# Patient Record
Sex: FEMALE | Race: WHITE | Employment: OTHER | URBAN - METROPOLITAN AREA
[De-identification: names, ages, dates, MRNs, and addresses within clinical notes are randomized per-mention and may not be internally consistent; named-entity substitution may affect disease eponyms.]

---

## 2017-06-15 ENCOUNTER — HOSPITAL ENCOUNTER (OUTPATIENT)
Age: 67
Setting detail: SPECIMEN
Discharge: HOME | End: 2017-06-15
Payer: MEDICARE

## 2017-06-16 LAB
DIRECT EXAM: NEGATIVE
DIRECT EXAM: NORMAL
Lab: NORMAL
SPECIMEN DESCRIPTION: NORMAL
STATUS: NORMAL

## 2017-07-17 ENCOUNTER — HOSPITAL ENCOUNTER (OUTPATIENT)
Age: 67
Setting detail: SPECIMEN
Discharge: HOME | End: 2017-07-17
Payer: MEDICARE

## 2017-07-17 LAB
ALBUMIN SERPL-MCNC: 4.3 G/DL (ref 3.5–5.2)
ALBUMIN/GLOBULIN RATIO: 1.9 (ref 1–2.5)
ALP BLD-CCNC: 109 U/L (ref 35–104)
ALT SERPL-CCNC: 18 U/L (ref 5–33)
AMYLASE: 49 U/L (ref 28–100)
ANION GAP SERPL CALCULATED.3IONS-SCNC: 15 MMOL/L (ref 9–17)
AST SERPL-CCNC: 24 U/L
BILIRUB SERPL-MCNC: 0.29 MG/DL (ref 0.3–1.2)
BUN BLDV-MCNC: 22 MG/DL (ref 8–23)
BUN/CREAT BLD: ABNORMAL (ref 9–20)
CALCIUM SERPL-MCNC: 9.5 MG/DL (ref 8.6–10.4)
CHLORIDE BLD-SCNC: 103 MMOL/L (ref 98–107)
CHOLESTEROL, FASTING: 203 MG/DL
CHOLESTEROL/HDL RATIO: 5.3
CO2: 24 MMOL/L (ref 20–31)
CREAT SERPL-MCNC: 1.14 MG/DL (ref 0.5–0.9)
GFR AFRICAN AMERICAN: 58 ML/MIN
GFR NON-AFRICAN AMERICAN: 48 ML/MIN
GFR SERPL CREATININE-BSD FRML MDRD: ABNORMAL ML/MIN/{1.73_M2}
GFR SERPL CREATININE-BSD FRML MDRD: ABNORMAL ML/MIN/{1.73_M2}
GLUCOSE FASTING: 108 MG/DL (ref 70–99)
HDLC SERPL-MCNC: 38 MG/DL
LDL CHOLESTEROL: 106 MG/DL (ref 0–130)
LIPASE: 65 U/L (ref 13–60)
POTASSIUM SERPL-SCNC: 4 MMOL/L (ref 3.7–5.3)
SODIUM BLD-SCNC: 142 MMOL/L (ref 135–144)
TOTAL PROTEIN: 6.6 G/DL (ref 6.4–8.3)
TRIGLYCERIDE, FASTING: 294 MG/DL
VLDLC SERPL CALC-MCNC: ABNORMAL MG/DL (ref 1–30)

## 2017-09-26 ENCOUNTER — HOSPITAL ENCOUNTER (OUTPATIENT)
Dept: GENERAL RADIOLOGY | Age: 67
Discharge: HOME | End: 2017-09-26
Payer: MEDICARE

## 2017-09-26 ENCOUNTER — HOSPITAL ENCOUNTER (OUTPATIENT)
Age: 67
Discharge: HOME | End: 2017-09-26
Payer: MEDICARE

## 2017-09-26 DIAGNOSIS — R52 PAIN: ICD-10-CM

## 2017-09-26 PROCEDURE — 73562 X-RAY EXAM OF KNEE 3: CPT

## 2017-10-24 ENCOUNTER — HOSPITAL ENCOUNTER (OUTPATIENT)
Age: 67
Setting detail: SPECIMEN
Discharge: HOME | End: 2017-10-24
Payer: MEDICARE

## 2017-10-30 LAB — DERMATOLOGY PATHOLOGY REPORT: NORMAL

## 2017-11-20 ENCOUNTER — HOSPITAL ENCOUNTER (OUTPATIENT)
Dept: WOMENS IMAGING | Age: 67
Discharge: HOME | End: 2017-11-20
Payer: MEDICARE

## 2017-11-20 DIAGNOSIS — Z12.31 SCREENING MAMMOGRAM, ENCOUNTER FOR: ICD-10-CM

## 2017-11-20 PROCEDURE — 77063 BREAST TOMOSYNTHESIS BI: CPT

## 2018-10-23 ENCOUNTER — HOSPITAL ENCOUNTER (OUTPATIENT)
Dept: GENERAL RADIOLOGY | Age: 68
Discharge: HOME | End: 2018-10-25
Payer: MEDICARE

## 2018-10-23 ENCOUNTER — HOSPITAL ENCOUNTER (OUTPATIENT)
Age: 68
Discharge: HOME | End: 2018-10-25
Payer: MEDICARE

## 2018-10-23 DIAGNOSIS — R52 PAIN: ICD-10-CM

## 2018-10-23 PROCEDURE — 73502 X-RAY EXAM HIP UNI 2-3 VIEWS: CPT

## 2022-04-22 NOTE — PROGRESS NOTES
[facility]: IVIS BUTLER W  Acute Inpatient Rehab Preadmission Assessment    Patient Name: Ruthie Churchill        MRN: [id number]    : 1950  (66 y.o.)  Gender: female     Admitted from:   [facility]  [][facility]   [][facility]   [][facility]   [x]Outside Admission - Location: [facility]                                 [x]Initial         []Updated    Date of Onset / Admission to the acute hospital:  22    Inpatient Rehabilitation Admitting Diagnosis:  Debility    Did patient have surgery/procedures? []No  [x]Yes:      22  L2-5 laminectomy with L4-5 fusion     Physicians: Deloris (NS), Jase (PMR)    Risk for clinical complications: Moderate    Co-morbidities:      1. HTN  2. HLD  3. DDD  4. Chronic reactive otitis externa  5. Leg length discrepancy  6. Hx polio with foot drop    Financial Information  Primary insurance:  [x]Medicare [] Medicare HMO  []Commercial insurance    []Medicaid   [] Medicaid HMO []Workers Compensation   []Personal Pay    Secondary Insurance:  []Medicare [] Medicare HMO  [x]Commercial insurance    []Medicaid  []Medicaid HMO  []Workers Compensation []None    Precautions:   []Cardiac Precautions:    []Total hip precautions:    []Weight Bearing status:  [x]Safety Precautions/Concerns:  Fall Risk, General Precautions, Spinal Precautions, Lumbar brace   [x]Visually impaired:  Blind L eye   []Hard of Hearing:     Isolation Precautions:         []Yes  [x]No  If Yes:  [] Droplet  []Contact []Airborne     []VRE     []MRSA     []C-diff         [] TB       [] ESBL [] MDRO          [] Other:        Physiatrist:  []   Dr. Angelito Villarreal     [x]   Dr. Narcisa Livingston  []   Dr. Wiliam Phillip  []   Dr. Baldemar Gunn    Patients Occupation: Retired    Reviewed Lab and Diagnostic reports from Current Admission: Yes    Patients Prior Functional  Level: Independent with ADLs, functional transfers, and gait    History of current illness, per PM&R Chart Review:  RHD Patient with history spinal stenosis at L4-5.  Dr. Yoshi Harding performed L2-5 laminectomy with L4-5 fusion on 4/18/22. She had some hypoxemia post operatively and was managed in step down. Treated for post op acute respiratory failure secondary to poor inspiratory effort, atelectasis, and hypoventilation.      Prognosis: Fair    Current functional status for upper extremity ADLs:  Min A       Current functional status for lower extremity ADLs:  Dependent       Current functional status for bed, chair, wheelchair transfers:  Min A        Current functional status for toilet transfers:  Min A       Current functional status for locomotion:  15' RW Min A        Current functional status for comprehension: TBD    Current functional status for expression: TBD    Current functional status for social interaction: TBD    Current functional status for problem solving: TBD    Current functional status for memory: TBD    Current Deficits R/T Impairment: Impaired Functional Mobility and Decreased ADLs    Required Therapy:   [x] Physical Therapy  [x] Occupational Therapy   [x] Speech Therapy, as appropriate    Additional Services:    [x]     [] Recreational Therapy, as appropriate    [x] Nutrition    [] Dialysis  [] Cultural Needs Identified  [] Special Equipment Needs  [x] Other:  O2 as ordered    Rehab Justification:  Needs 3 hrs therapy per day or 15 hours per week:  Yes  Identified Rehab Nursing needs: Yes  Intense Interdisciplinary need:  Yes  Need for 24 hr physician supervision:  Yes  Measurable improved quality of life:  Yes  Willingness to participate:  Yes  Medical Necessity:  Yes  Patient able to tolerate care proposed:  Yes    Expected Discharge Destination/Functional Level:  Home with assist  Expected length of time to achieve that level of improvement: 1-2 weeks  Expected Post Discharge Treatments: Home with possible Home Care    Other information relevant to patient's care needs:  N/A    Acute Inpatient Rehabilitation Disclosure Statement will be provided to patient upon admission to ARU with patient's verbalization of understanding. I have reviewed and concur with the findings and results of the pre-admission screening assessment completed by the Inpatient Rehabilitation Admissions Coordinator.

## 2022-04-22 NOTE — PROGRESS NOTES
Spoke with Kunal Saavedra, [facility], who states pt will be medically ready on Saturday. Writer requested admitting orders with [facility] and continuation of DVT prophylaxis be faxed and report be called to [facility] station. Admission Assessment completed and Dr Melly Gamez notified.

## 2022-04-23 ENCOUNTER — HOSPITAL ENCOUNTER (INPATIENT)
Age: 72
LOS: 11 days | Discharge: HOME HEALTH | DRG: 947 | End: 2022-05-04
Attending: PHYSICAL MEDICINE & REHABILITATION | Admitting: PHYSICAL MEDICINE & REHABILITATION
Payer: MEDICARE

## 2022-04-23 DIAGNOSIS — R53.81 DEBILITY: ICD-10-CM

## 2022-04-23 DIAGNOSIS — Z98.890 STATUS POST LUMBAR SPINE SURGERY FOR DECOMPRESSION OF SPINAL CORD: Primary | ICD-10-CM

## 2022-04-23 PROCEDURE — 6370000000 HC RX 637 (ALT 250 FOR IP): Performed by: PHYSICAL MEDICINE & REHABILITATION

## 2022-04-23 PROCEDURE — 94640 AIRWAY INHALATION TREATMENT: CPT

## 2022-04-23 PROCEDURE — 6360000002 HC RX W HCPCS: Performed by: PHYSICAL MEDICINE & REHABILITATION

## 2022-04-23 PROCEDURE — 1180000000 HC REHAB R&B

## 2022-04-23 RX ORDER — AMLODIPINE BESYLATE 10 MG/1
10 TABLET ORAL DAILY
Status: DISCONTINUED | OUTPATIENT
Start: 2022-04-24 | End: 2022-05-04 | Stop reason: HOSPADM

## 2022-04-23 RX ORDER — DORZOLAMIDE HYDROCHLORIDE AND TIMOLOL MALEATE 20; 5 MG/ML; MG/ML
1 SOLUTION/ DROPS OPHTHALMIC 2 TIMES DAILY
Status: DISCONTINUED | OUTPATIENT
Start: 2022-04-24 | End: 2022-05-04 | Stop reason: HOSPADM

## 2022-04-23 RX ORDER — LIDOCAINE 4 G/G
2 PATCH TOPICAL DAILY
Status: DISCONTINUED | OUTPATIENT
Start: 2022-04-24 | End: 2022-04-28

## 2022-04-23 RX ORDER — MONTELUKAST SODIUM 10 MG/1
10 TABLET ORAL DAILY
Status: DISCONTINUED | OUTPATIENT
Start: 2022-04-24 | End: 2022-05-04 | Stop reason: HOSPADM

## 2022-04-23 RX ORDER — BISACODYL 10 MG
10 SUPPOSITORY, RECTAL RECTAL DAILY PRN
Status: DISCONTINUED | OUTPATIENT
Start: 2022-04-23 | End: 2022-05-04 | Stop reason: HOSPADM

## 2022-04-23 RX ORDER — PANTOPRAZOLE SODIUM 40 MG/1
40 TABLET, DELAYED RELEASE ORAL
Status: DISCONTINUED | OUTPATIENT
Start: 2022-04-24 | End: 2022-05-04 | Stop reason: HOSPADM

## 2022-04-23 RX ORDER — HEPARIN SODIUM 5000 [USP'U]/ML
5000 INJECTION, SOLUTION INTRAVENOUS; SUBCUTANEOUS EVERY 8 HOURS SCHEDULED
Status: DISCONTINUED | OUTPATIENT
Start: 2022-04-23 | End: 2022-05-04 | Stop reason: HOSPADM

## 2022-04-23 RX ORDER — ACETAMINOPHEN 325 MG/1
650 TABLET ORAL EVERY 4 HOURS PRN
Status: DISCONTINUED | OUTPATIENT
Start: 2022-04-23 | End: 2022-05-04 | Stop reason: HOSPADM

## 2022-04-23 RX ORDER — TIZANIDINE 4 MG/1
4 TABLET ORAL EVERY 6 HOURS PRN
Status: DISCONTINUED | OUTPATIENT
Start: 2022-04-23 | End: 2022-05-04 | Stop reason: HOSPADM

## 2022-04-23 RX ORDER — ALBUTEROL SULFATE 90 UG/1
2 AEROSOL, METERED RESPIRATORY (INHALATION) 4 TIMES DAILY
Status: DISCONTINUED | OUTPATIENT
Start: 2022-04-23 | End: 2022-04-25

## 2022-04-23 RX ORDER — TRAMADOL HYDROCHLORIDE 50 MG/1
50 TABLET ORAL EVERY 6 HOURS PRN
Status: DISCONTINUED | OUTPATIENT
Start: 2022-04-23 | End: 2022-05-04 | Stop reason: HOSPADM

## 2022-04-23 RX ORDER — POLYETHYLENE GLYCOL 3350 17 G/17G
17 POWDER, FOR SOLUTION ORAL DAILY
Status: DISCONTINUED | OUTPATIENT
Start: 2022-04-23 | End: 2022-05-04 | Stop reason: HOSPADM

## 2022-04-23 RX ORDER — ROSUVASTATIN CALCIUM 10 MG/1
5 TABLET, COATED ORAL NIGHTLY
Status: DISCONTINUED | OUTPATIENT
Start: 2022-04-23 | End: 2022-05-04 | Stop reason: HOSPADM

## 2022-04-23 RX ORDER — SENNA PLUS 8.6 MG/1
2 TABLET ORAL DAILY PRN
Status: DISCONTINUED | OUTPATIENT
Start: 2022-04-23 | End: 2022-05-04 | Stop reason: HOSPADM

## 2022-04-23 RX ORDER — ERGOCALCIFEROL 1.25 MG/1
50000 CAPSULE ORAL WEEKLY
Status: DISCONTINUED | OUTPATIENT
Start: 2022-04-24 | End: 2022-05-04 | Stop reason: HOSPADM

## 2022-04-23 RX ADMIN — ACETAMINOPHEN 650 MG: 325 TABLET ORAL at 21:48

## 2022-04-23 RX ADMIN — HEPARIN SODIUM 5000 UNITS: 5000 INJECTION INTRAVENOUS; SUBCUTANEOUS at 16:46

## 2022-04-23 RX ADMIN — HEPARIN SODIUM 5000 UNITS: 5000 INJECTION INTRAVENOUS; SUBCUTANEOUS at 21:48

## 2022-04-23 RX ADMIN — ROSUVASTATIN CALCIUM 5 MG: 10 TABLET, FILM COATED ORAL at 21:49

## 2022-04-23 RX ADMIN — TIZANIDINE 4 MG: 4 TABLET ORAL at 18:15

## 2022-04-23 RX ADMIN — TRAMADOL HYDROCHLORIDE 50 MG: 50 TABLET, COATED ORAL at 18:15

## 2022-04-23 RX ADMIN — ALBUTEROL SULFATE 2 PUFF: 90 AEROSOL, METERED RESPIRATORY (INHALATION) at 20:17

## 2022-04-23 ASSESSMENT — PAIN SCALES - GENERAL
PAINLEVEL_OUTOF10: 4
PAINLEVEL_OUTOF10: 3
PAINLEVEL_OUTOF10: 7

## 2022-04-23 ASSESSMENT — LIFESTYLE VARIABLES
HOW MANY STANDARD DRINKS CONTAINING ALCOHOL DO YOU HAVE ON A TYPICAL DAY: 1 OR 2
HOW OFTEN DO YOU HAVE A DRINK CONTAINING ALCOHOL: NEVER

## 2022-04-23 ASSESSMENT — PAIN DESCRIPTION - ORIENTATION
ORIENTATION: LOWER
ORIENTATION: LOWER
ORIENTATION: MID

## 2022-04-23 ASSESSMENT — PAIN DESCRIPTION - DESCRIPTORS
DESCRIPTORS: TIGHTNESS;ACHING;DISCOMFORT
DESCRIPTORS: ACHING;DULL

## 2022-04-23 ASSESSMENT — PAIN DESCRIPTION - PAIN TYPE: TYPE: ACUTE PAIN

## 2022-04-23 ASSESSMENT — PAIN DESCRIPTION - LOCATION
LOCATION: BACK

## 2022-04-23 ASSESSMENT — PAIN DESCRIPTION - ONSET: ONSET: GRADUAL

## 2022-04-23 ASSESSMENT — PAIN - FUNCTIONAL ASSESSMENT: PAIN_FUNCTIONAL_ASSESSMENT: ACTIVITIES ARE NOT PREVENTED

## 2022-04-23 ASSESSMENT — PAIN DESCRIPTION - FREQUENCY: FREQUENCY: INTERMITTENT

## 2022-04-23 NOTE — PROGRESS NOTES
ACUTE REHABILITATION ADMISSION    Patient admitted to the Inpatient Rehabilitation Unit via Wheelchair at 1150. Patient oriented to room, unit and fall prevention safety measures. Education provided on the rehabilitation routine: three hours of therapy five days per week. Admitting medication orders compared with acute stay medications; home medication list reviewed with patient/family. Medication issues identified No      Admitting medication orders reviewed with Acute Rehab PM&R Physician: Luisa Ganser. Deep Dc MD    Skin assessment performed by Karyn Garcia RN, all active alterations in skin integrity, wounds, lines, drains and airways assessed, measured, recorded and reconciled. Refer to [address] [place] [address] avatar and LDA flowsheet for additional information. Wound care consult order needed for complex wounds or pressure injuries? No If yes, contact physician for order. Bladder and Bowel Function Assessment:  1. Prior history of bladder problems: no problems with bladder  2. Number of pads used per day: 0  3. Frequency of night time voiding: once  4. Fluid intake volume and pattern: Normal  5. Last BM: 4/21/2022  6. Prior history of bowel problems: Yes     Constipation. Care plan was created with patient's input and goals were agreed upon. Admission folder with the following documents provided:  1. \"[facility] Individualized Disclosure Statement\"  2. \"Data Collection Information Summary for Patients in Inpatient Rehabilitation Facilities\"  3. \"Privacy Act Statement - Health Care Records\"    Please refer to the admission navigator for further information.

## 2022-04-24 PROBLEM — Z98.890 STATUS POST LUMBAR SPINE SURGERY FOR DECOMPRESSION OF SPINAL CORD: Status: ACTIVE | Noted: 2022-04-24

## 2022-04-24 PROBLEM — R11.2 NON-INTRACTABLE VOMITING WITH NAUSEA: Status: ACTIVE | Noted: 2022-04-24

## 2022-04-24 LAB
ABSOLUTE EOS #: 0.2 K/UL (ref 0–0.4)
ABSOLUTE LYMPH #: 3.4 K/UL (ref 1–4.8)
ABSOLUTE MONO #: 0.9 K/UL (ref 0.1–1.3)
ANION GAP SERPL CALCULATED.3IONS-SCNC: 11 MMOL/L (ref 9–17)
BASOPHILS # BLD: 1 % (ref 0–2)
BASOPHILS ABSOLUTE: 0.1 K/UL (ref 0–0.2)
BUN BLDV-MCNC: 25 MG/DL (ref 8–23)
CALCIUM SERPL-MCNC: 9.7 MG/DL (ref 8.6–10.4)
CHLORIDE BLD-SCNC: 101 MMOL/L (ref 98–107)
CO2: 26 MMOL/L (ref 20–31)
CREAT SERPL-MCNC: 1.17 MG/DL (ref 0.5–0.9)
EOSINOPHILS RELATIVE PERCENT: 2 % (ref 0–4)
GFR AFRICAN AMERICAN: 55 ML/MIN
GFR NON-AFRICAN AMERICAN: 45 ML/MIN
GFR SERPL CREATININE-BSD FRML MDRD: ABNORMAL ML/MIN/{1.73_M2}
GLUCOSE BLD-MCNC: 109 MG/DL (ref 70–99)
HCT VFR BLD CALC: 34.5 % (ref 36–46)
HEMOGLOBIN: 11.6 G/DL (ref 12–16)
LYMPHOCYTES # BLD: 32 % (ref 24–44)
MCH RBC QN AUTO: 30.3 PG (ref 26–34)
MCHC RBC AUTO-ENTMCNC: 33.6 G/DL (ref 31–37)
MCV RBC AUTO: 90.2 FL (ref 80–100)
MONOCYTES # BLD: 8 % (ref 1–7)
PDW BLD-RTO: 14.2 % (ref 11.5–14.9)
PLATELET # BLD: 436 K/UL (ref 150–450)
PMV BLD AUTO: 7.6 FL (ref 6–12)
POTASSIUM SERPL-SCNC: 4.2 MMOL/L (ref 3.7–5.3)
RBC # BLD: 3.82 M/UL (ref 4–5.2)
SEG NEUTROPHILS: 57 % (ref 36–66)
SEGMENTED NEUTROPHILS ABSOLUTE COUNT: 6.1 K/UL (ref 1.3–9.1)
SODIUM BLD-SCNC: 138 MMOL/L (ref 135–144)
WBC # BLD: 10.6 K/UL (ref 3.5–11)

## 2022-04-24 PROCEDURE — 97535 SELF CARE MNGMENT TRAINING: CPT

## 2022-04-24 PROCEDURE — 99223 1ST HOSP IP/OBS HIGH 75: CPT | Performed by: PHYSICAL MEDICINE & REHABILITATION

## 2022-04-24 PROCEDURE — 97110 THERAPEUTIC EXERCISES: CPT

## 2022-04-24 PROCEDURE — 6370000000 HC RX 637 (ALT 250 FOR IP): Performed by: PHYSICAL MEDICINE & REHABILITATION

## 2022-04-24 PROCEDURE — 1180000000 HC REHAB R&B

## 2022-04-24 PROCEDURE — 97116 GAIT TRAINING THERAPY: CPT

## 2022-04-24 PROCEDURE — 6360000002 HC RX W HCPCS: Performed by: PHYSICAL MEDICINE & REHABILITATION

## 2022-04-24 PROCEDURE — 85025 COMPLETE CBC W/AUTO DIFF WBC: CPT

## 2022-04-24 PROCEDURE — 97166 OT EVAL MOD COMPLEX 45 MIN: CPT

## 2022-04-24 PROCEDURE — 97162 PT EVAL MOD COMPLEX 30 MIN: CPT

## 2022-04-24 PROCEDURE — 80048 BASIC METABOLIC PNL TOTAL CA: CPT

## 2022-04-24 PROCEDURE — 99222 1ST HOSP IP/OBS MODERATE 55: CPT | Performed by: INTERNAL MEDICINE

## 2022-04-24 PROCEDURE — 36415 COLL VENOUS BLD VENIPUNCTURE: CPT

## 2022-04-24 PROCEDURE — 97530 THERAPEUTIC ACTIVITIES: CPT

## 2022-04-24 RX ORDER — ONDANSETRON 4 MG/1
4 TABLET, FILM COATED ORAL EVERY 8 HOURS PRN
Status: DISCONTINUED | OUTPATIENT
Start: 2022-04-24 | End: 2022-05-04 | Stop reason: HOSPADM

## 2022-04-24 RX ADMIN — PANTOPRAZOLE SODIUM 40 MG: 40 TABLET, DELAYED RELEASE ORAL at 05:50

## 2022-04-24 RX ADMIN — HEPARIN SODIUM 5000 UNITS: 5000 INJECTION INTRAVENOUS; SUBCUTANEOUS at 15:10

## 2022-04-24 RX ADMIN — DORZOLAMIDE HYDROCHLORIDE AND TIMOLOL MALEATE 1 DROP: 20; 5 SOLUTION/ DROPS OPHTHALMIC at 21:28

## 2022-04-24 RX ADMIN — TRAMADOL HYDROCHLORIDE 50 MG: 50 TABLET, COATED ORAL at 00:19

## 2022-04-24 RX ADMIN — AMLODIPINE BESYLATE 10 MG: 10 TABLET ORAL at 08:23

## 2022-04-24 RX ADMIN — TRAMADOL HYDROCHLORIDE 50 MG: 50 TABLET, COATED ORAL at 15:20

## 2022-04-24 RX ADMIN — TIZANIDINE 4 MG: 4 TABLET ORAL at 15:20

## 2022-04-24 RX ADMIN — TIZANIDINE 4 MG: 4 TABLET ORAL at 06:21

## 2022-04-24 RX ADMIN — TRAMADOL HYDROCHLORIDE 50 MG: 50 TABLET, COATED ORAL at 06:21

## 2022-04-24 RX ADMIN — MONTELUKAST SODIUM 10 MG: 10 TABLET, FILM COATED ORAL at 08:24

## 2022-04-24 RX ADMIN — TIZANIDINE 4 MG: 4 TABLET ORAL at 21:28

## 2022-04-24 RX ADMIN — ROSUVASTATIN CALCIUM 5 MG: 10 TABLET, FILM COATED ORAL at 21:27

## 2022-04-24 RX ADMIN — TIZANIDINE 4 MG: 4 TABLET ORAL at 00:19

## 2022-04-24 RX ADMIN — POLYETHYLENE GLYCOL 3350 17 G: 17 POWDER, FOR SOLUTION ORAL at 08:24

## 2022-04-24 RX ADMIN — TRAMADOL HYDROCHLORIDE 50 MG: 50 TABLET, COATED ORAL at 21:28

## 2022-04-24 RX ADMIN — HEPARIN SODIUM 5000 UNITS: 5000 INJECTION INTRAVENOUS; SUBCUTANEOUS at 05:50

## 2022-04-24 RX ADMIN — HEPARIN SODIUM 5000 UNITS: 5000 INJECTION INTRAVENOUS; SUBCUTANEOUS at 21:40

## 2022-04-24 RX ADMIN — ERGOCALCIFEROL 50000 UNITS: 1.25 CAPSULE ORAL at 08:24

## 2022-04-24 ASSESSMENT — PAIN DESCRIPTION - LOCATION
LOCATION: BACK

## 2022-04-24 ASSESSMENT — PAIN DESCRIPTION - DESCRIPTORS
DESCRIPTORS: ACHING

## 2022-04-24 ASSESSMENT — PAIN DESCRIPTION - ORIENTATION
ORIENTATION: LOWER
ORIENTATION: LOWER

## 2022-04-24 ASSESSMENT — PAIN SCALES - GENERAL
PAINLEVEL_OUTOF10: 6
PAINLEVEL_OUTOF10: 3
PAINLEVEL_OUTOF10: 2
PAINLEVEL_OUTOF10: 1

## 2022-04-24 ASSESSMENT — 9 HOLE PEG TEST
TEST_RESULT: FUNCTIONAL
TESTTIME_SECONDS: 29.4
TESTTIME_SECONDS: 27.3
TEST_RESULT: FUNCTIONAL

## 2022-04-24 NOTE — FLOWSHEET NOTE
04/24/22 1459   Encounter Summary   Encounter Overview/Reason  Volunteer Encounter   Service Provided For: Patient   Referral/Consult From: Gene   Last Encounter  04/24/22   Complexity of Encounter Low   Spiritual/Emotional needs   Type Spiritual Support   Rituals, Rites and Sacraments   Type Advent Communion   Assessment/Intervention/Outcome   Intervention Prayer (assurance of)/[place]

## 2022-04-24 NOTE — PLAN OF CARE
Problem: Safety - Adult  Goal: Free from fall injury  4/24/2022 0258 by Epifanio Knowles RN  Outcome: Progressing  Note: No falls or injuries sustained at this time. No attempts to get out of bed without nursing assistance. Call light within reach and pt. uses appropriately for assistance. Siderails up x 2. Nonskid footwear remains on. Bed in low and locked position. Hourly nursing rounds made. Pt. Alert and oriented, aware of limitations, and exhibits good safety judgement. Pt. uses assistive devices appropriately. Pt. understands individual fall risk factors. Problem: ABCDS Injury Assessment  Goal: Absence of physical injury  4/24/2022 0258 by Epifanio Knowles RN  Outcome: Progressing  Note: No falls or injuries sustained at this time. No attempts to get out of bed without nursing assistance. Call light within reach and pt. uses appropriately for assistance. Problem: Pain  Goal: Verbalizes/displays adequate comfort level or baseline comfort level  Outcome: Progressing  Note: Pain assessment completed. Pt. able to rest.Patient medicated with PRN tramadol for pain this shift as ordered. Patient relates a tolerable level of discomfort with the current medication. Nonverbal cues indicate pain relief. Pt. Rests quietly with eyes closed after pain medication administration. Respirations easy and unlabored. Appears free from distress.

## 2022-04-24 NOTE — PROGRESS NOTES
Physical Therapy  Facility/Department: [facility] ACUTE REHAB  Rehabilitation Physical Therapy Initial Assessment    NAME: Katherine Calvo  : 1950 (66 y.o.)  MRN: [id number]  CODE STATUS: Full Code    Date of Service: 22      Past Medical History:   Diagnosis Date    Arthritis     Chronic back pain     ddd    GERD (gastroesophageal reflux disease)     Hyperlipidemia     Hypertension     MVP (mitral valve prolapse)     Polio     right leg at age 2     Past Surgical History:   Procedure Laterality Date    BACK SURGERY      BREAST SURGERY      COLONOSCOPY      HEMORRHOID SURGERY      HYSTERECTOMY      LEG SURGERY      fused right heel and right leg lengthened-due to polio    TONSILLECTOMY         Chart Reviewed: Yes  Patient assessed for rehabilitation services?: Yes  Referring Practitioner: Dr Nikolai Melo  Referral Date : 22    Restrictions:  Restrictions/Precautions: Surgical Protocols; Fall Risk;General Precautions  Required Braces or Orthoses  Spinal: Lumbar Corset  Right Lower Extremity Brace: Ankle Foot Orthotics (Does not used, asked if family can bring here to assess.)  Position Activity Restriction  Spinal Precautions: No Bending; No Lifting; No Twisting (for 2 weeks after surgery, avoid strenous activity)  Other position/activity restrictions: No lifting greater than 1-2 lbs for 14 days     SUBJECTIVE  Pain: Reports pain 2/10 at back at rest, increased to 3/10 with mobility       Post Treatment Pain Screening Tolerable, pt get pain patches after shower.        Prior Level of Function:  Social/Functional History  Lives With: Spouse  Type of Home: House  Home Layout: One level  Home Access: Stairs to enter with rails (Has a brick  ledge /end to support on one side)  Entrance Stairs - Number of Steps: 1+1+ledge at [place] Stairs - Rails:  (Has  a post to hold on right side)  Bathroom Shower/Tub: Walk-in shower,Doors (Also has a Tub shower)  Bathroom Toilet: Handicap height  Bathroom Equipment: 3-in-1 commode (Has one suction bar which she can use if needed)  Bathroom Accessibility: Walker accessible  Home Equipment: Walker, rolling,Wheelchair-manual (RW and W/C borrowed)  Has the patient had two or more falls in the past year or any fall with injury in the past year?: Yes  Receives Help From: Family  ADL Assistance: Independent  Ambulation Assistance: Independent (Ind without device usually, lately used rolling walker )  Transfer Assistance: Independent  Active : Yes  Mode of Transportation: SUV  Occupation: Retired  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 Sleeps in a regular bed at home. 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 Pt reports that her 2 sons and wives are able to assist as needed. OBJECTIVE  Vision  Vision: Impaired  Vision Exceptions:  (Blind left eye, centeral vison in R eye only; ramón vision B )    Hearing  Hearing: Exceptions to [facility]  Hearing Exceptions: Left hearing aid;Bilateral hearing aid (Deaf in R ear, Always uses L aid, occasionally R aid)    Cognition  Overall Cognitive Status: WFL    ROM  AROM RLE (degrees)  RLE General AROM: Hip/Knee WFL, decreased R ankle DF from history of polio and previous foot/ankle surgery. R LE slight shorter than L LE. PROM LLE (degrees)  LLE PROM: Exceptions  AROM LLE (degrees)  LLE AROM : WFL  AROM RUE (degrees)  RUE General AROM: Reports  R shoulder is sore from fall last year.     Strength  Strength RLE  Comment: Hip 3/5, knee 3+/5, trace DF, PF 2/4  Strength LLE  Comment: 4/5    Quality of Movement       Sensation  Overall Sensation Status: Impaired (Pt reports new feeling of tape over the middle of left foot)    Functional Mobility  Bed mobility  Rolling to Left: Minimal assistance  Rolling to Right: Minimal assistance  Supine to Sit: Minimal assistance  Sit to Supine: Moderate assistance  Scooting: Minimal assistance  Comment: Pt very sleepy today, but easily arousable  pt reports nausea and did have vomit after sat at EOB, RN notified. Transfers  Sit to Stand: Minimal Assistance  Stand to sit: Minimal Assistance  Bed to Chair: Minimal assistance  Comment: Transfers with rolling walker, needs cues for hand placement  Balance  Posture: Good  Sitting - Static: Good  Sitting - Dynamic: Fair;+  Standing - Static: Fair;+  Standing - Dynamic: Fair  Comments: Standing with rolling walker    Environmental Mobility  Ambulation  Surface: level tile  Device: Rolling Walker  Assistance: Contact guard assistance;Minimal assistance  Quality of Gait: WBOS, R Decreased foot clearance, L Decreased foot clearance, Decreased claire, Forward trunk, R Foot flat, L Foot flat  Gait Deviations: Increased YASMANI; Decreased step length;Decreased step height;Slow Claire  Distance: 45 ft x2 (2MWT), pt had to stop after walking 45 ft (1 minute) and sit down due to vomiting. Pt gets up again and ambulates another 45 ft (1minute)  Comments: Fatiques easily, nausea and vomiting limiting activity. Ambulation 2  Surface - 2: level tile  Device 2: Rolling Walker  Assistance 2: Minimal assistance  Quality of Gait 2: Improved claire compared to a:m session, pt still sleepy, fallin g asleep while resting in chiar. Distance: 95 ft  Comments: Assisted pt back to bed after session. PT Exercises  A/AROM Exercises: R LE-15 res  Resistive Exercises: 1.5 lb on L LE, orange T band x 15 reps, B LE  Circulation/Endurance Exercises: Abkle pumpsx 20 reps    ASSESSMENT       Activity Tolerance  Activity Tolerance: Patient limited by fatigue;Patient limited by endurance; Other (comment) (Nausea/vomit)    Assessment  Assessment: Presents with B LE weakness, R LE > L LE, also history of polio with R side weakness and R foot drop.  Pt requires min A for mobility at this time with rolling walker. Needs skilled physical therapy to progress to PLOF. Performance Deficits/Impairments: Decreased functional mobility ; Decreased body mechanics; Decreased tolerance to work activity; Decreased strength;Decreased safe awareness;Decreased endurance;Decreased balance; Increased pain;Decreased posture  Activity Tolerance Comment: Fatiques but emy to conitnue with small breaks in between. Treatment Diagnosis: Impaired mobility  Therapy Prognosis: Good  Decision Making: Medium Complexity  History: Hx of polio, R LE shorter than L LE  Discharge Recommendations: Patient able to tolerate 3hrs of therapy a day;Home with assist PRN  PT D/C Equipment  Other: Pt has a borrowed rolling walker  PT Equipment Recommendations  Other: Pt has a borrowed rolling walker    CLINICAL IMPRESSION   Presents with B LE weakness, R LE > L LE, also history of polio with R side weakness and R foot drop. Pt requires min A for mobility at this time with rolling walker. Needs skilled physical therapy to progress to PLOF. GOALS  Patient Goals   Patient goals : Get stronger  Short Term Goals  Time Frame for Short term goals: 1 week  Short term goal 1: Pt able to roll in bed maintaingn Back precautions independently  Short term goal 2: Pt able to perfrom supien <>sit SBA  Short term goal 3: Pt ableto transfers SBA wiht rolling walker  Short term goal 4:  Pt able to ambulate 150 ft with rolling walker distance of 150 ft, SBA  Short term goal 5: Pt able to go up and down 5 steps with 2 UE support, min A  Long Term Goals  Time Frame for Long term goals : By DC  Long term goal 1: Pt able to perform supine<>sit mod-I  Long term goal 2: Pt able to transfer mod-I from various surfaces with rolling walker  Long term goal 3: Pt able to ambulte with rolling walker on leel as well as outside terraine at mod- I distnace of atleast 200 ft.   Long term goal 4: Pt to improve 2MW distance to 150ft to improve fucntion and gait speed  Long term goal 5: Pt able to go up and down 5 to 12 steps with 1 UE support, SBA    PLAN OF CARE  Frequency: 1-2 treatment sessions per day, 5-7 days per week  Plan  Plan:  minutes of therapy at least 5 out of 7 days a week  Current Treatment Recommendations: Strengthening;Balance training;Functional mobility training;Transfer training; Endurance training;Gait training;Stair training; Safety education & training;Patient/Caregiver education & training;Equipment evaluation, education, & procurement  Safety Devices  Type of Devices: Left in chair;Gait belt;Call light within reach (OTR coming soon to see the pt.)    ELOS:          Therapy Time   A:M 0825 to 0931 total time, treatment time 46 mminutes   Individual Concurrent Group Co-treatment   Time In 1112         Time Out 1150         Minutes 38           Timed Code Treatment Minutes: 46 minutes+38 Minutes=84 minutes.        Jada Arechiga, PT, 04/24/22 at 12:23 PM

## 2022-04-24 NOTE — PLAN OF CARE
Problem: Discharge Planning  Goal: Discharge to home or other facility with appropriate resources  Outcome: Progressing     Problem: Safety - Adult  Goal: Free from fall injury  4/24/2022 1133 by Enrike Muniz LPN  Outcome: Progressing  4/24/2022 0258 by Audra Solo RN  Outcome: Progressing  Note: No falls or injuries sustained at this time. No attempts to get out of bed without nursing assistance. Call light within reach and pt. uses appropriately for assistance. Siderails up x 2. Nonskid footwear remains on. Bed in low and locked position. Hourly nursing rounds made. Pt. Alert and oriented, aware of limitations, and exhibits good safety judgement. Pt. uses assistive devices appropriately. Pt. understands individual fall risk factors. Problem: ABCDS Injury Assessment  Goal: Absence of physical injury  4/24/2022 1133 by Enrike Muniz LPN  Outcome: Progressing  4/24/2022 0258 by Audra Solo RN  Outcome: Progressing  Note: No falls or injuries sustained at this time. No attempts to get out of bed without nursing assistance. Call light within reach and pt. uses appropriately for assistance. Problem: Pain  Goal: Verbalizes/displays adequate comfort level or baseline comfort level  4/24/2022 1133 by Enrike Muniz LPN  Outcome: Progressing  4/24/2022 0258 by Audra Solo RN  Outcome: Progressing  Note: Pain assessment completed. Pt. able to rest.Patient medicated with PRN tramadol for pain this shift as ordered. Patient relates a tolerable level of discomfort with the current medication. Nonverbal cues indicate pain relief. Pt. Rests quietly with eyes closed after pain medication administration. Respirations easy and unlabored. Appears free from distress. Problem: Skin/Tissue Integrity  Goal: Absence of new skin breakdown  Description: 1. Monitor for areas of redness and/or skin breakdown  2. Assess vascular access sites hourly  3.   Every 4-6 hours minimum:  Change oxygen saturation probe site  4. Every 4-6 hours:  If on nasal continuous positive airway pressure, respiratory therapy assess nares and determine need for appliance change or resting period.   Outcome: Progressing

## 2022-04-24 NOTE — PROGRESS NOTES
Occupational Therapy  Facility/Department: [facility]  Rehabilitation Occupational Therapy Evaluation       Date: 22  Patient Name: Arnel Antunez       Room: 7122/9003-41  MRN: [id number]  Account: [de-identified]   : 1950  (66 y.o.) Gender: female     Referring Practitioner: Albert Perdue MD  Diagnosis: Debility       Restrictions  Restrictions/Precautions: Surgical Protocols; Fall Risk;General Precautions  Implants present? : Metal implants (Back surgery, R ankle surgery)  Other position/activity restrictions: No lifting greater than 1-2 lbs for 14 days  Required Braces or Orthoses  Spinal: Lumbar Corset  Right Lower Extremity Brace: Ankle Foot Orthotics (Does not used, asked if family can bring here to assess.)  Required Braces or Orthoses?: Yes    Subjective  Subjective: \"Right now just get back to normal and hope that my pain is gone from my back. \" Pt stated in regards to ARU goals. Comments: Pt nauseated but agreeable to occupational therapy          Vitals  Temp: 97.3 °F (36.3 °C)  Pulse: 69  Resp: 18  BP: 102/62  Height: 5' 4\" (162.6 cm)  Weight: 182 lb (82.6 kg)  BMI (Calculated): 31.3  Oxygen Therapy  SpO2: 95 %  Pulse Oximeter Device Mode: Intermittent  Pulse Oximeter Device Location: Left,Hand,Finger  O2 Device: None (Room air)  Skin Assessment: Clean, dry, & intact  Skin Protection for O2 Device: N/A  Blood Gas  Performed?: No  Level of Consciousness: Alert (0)    Objective  Vision  Vision Exceptions:  (Blind left eye, centeral vison in R eye only; peripheral vision in left eye)  Hearing  Hearing: Exceptions to [facility]  Hearing Exceptions: Left hearing aid;Bilateral hearing aid (Deaf in R ear, Always uses L aid, occasionally R aid)  Vision - Basic Assessment  Prior Vision: Other (add comment) (Visual deficits in B eyes; Blind L eye; only Central in R ey)  Vision Comments: Pt reports poor depth perception due to visual deficits.    Cognition  Overall Cognitive Status: WFL  Orientation  Overall Orientation Status: Within Functional Limits   Perception  Overall Perceptual Status: WFL  Sensation  Overall Sensation Status: Impaired (Pt reports new feeling of tape over the middle of left foot)   ROM  LUE AROM (degrees)  LUE AROM : WFL  Left Hand AROM (degrees)  Left Hand AROM: WFL  RUE AROM (degrees)  RUE AROM : WFL  RUE General AROM: Increasaed time with pain noted in shoulder due to injury from fall 1 year ago  Right Hand AROM (degrees)  Right Hand AROM: WFL  LUE Strength  L Hand General: 3+/5  RUE Strength  R Hand General: 4-/5  Fine Motor Skills  Coordination  Movements Are Fluid And Coordinated: Yes       Hand Assessment  Hand Dominance  Hand Dominance: Right  Right Hand Strength -  (lbs)  Handle Setting 2: 30# (31, 31, 28) (Norm: 32-54#)        04/24/22 0940   Fine Motor Skills   Left 9-Hole Peg Test Functional   Left 9 Hole Peg Test Time (secs) 27.3  (Norm: 21-30 seconds)   Right 9-Hole Peg Test Functional   Right 9 Hole Peg Test Time (secs) 29.4  (Norm: 21-30 seconds)     Functional Mobility  Balance  Sitting Balance: Stand by assistance  Standing Balance: Minimal assistance (Min A - CGA)  Standing Balance  Time: 1-2 minutes x 4  Activity: Functional transfers/mobility, self care tasks  Comment: Verbal cues for hand placement and safety  Transfers  Sit to stand: Minimal assistance (min A -CGA)  Stand to sit: Contact guard assistance  Transfer Comments: Verbal cues for hand placement and safety  Toilet Transfers  Toilet - Technique: Ambulating  Equipment Used: Raised toilet seat with rails  Toilet Transfer: Minimal assistance  Toilet Transfers Comments: Verbal cues for hand placement and safety  Shower Transfers  Shower - Transfer From: Walker  Shower - Transfer Type: To and From  Shower - Transfer To:  Transfer tub bench  Shower - Technique: Ambulating  Shower Transfers: Minimal assistance  Shower Transfers Comments: Verbal cues for safety over threshold  ADL  Feeding: Modified independent   Feeding Skilled Clinical Factors: per pt report with increased time  Grooming: Stand by assistance  Grooming Skilled Clinical Factors: sitting EOB  UE Bathing: Stand by assistance  UE Bathing Skilled Clinical Factors: Sitting on tub bench with verbal cues for back precautions  LE Bathing: Moderate assistance  LE Bathing Skilled Clinical Factors: A with bilateral lower legs/feet and bottom  UE Dressing: Moderate assistance  UE Dressing Skilled Clinical Factors: A with doffing/donning back brace  LE Dressing: Maximum assistance  LE Dressing Skilled Clinical Factors: A with doffing/donning bilateral socks and A with threading LLE with increased time and verbal cues for back precautions  Toileting: Minimal assistance  Toileting Skilled Clinical Factors: A for bottom hygiene  Additional Comments: OT facilitated pts engagement in full shower on this date with use of tub bench, grab bars, and hand held shower head. Lunbar corset doffed while sitting on tub bench. Pt required A with all lower body self care tasks at this time. Pt currently limited due to decerased strength, balance, activity tolerance, nausea, and pain limiting safety and independence with self care tasls. PM: OT introduced patient to AE (reacher, sock aid, and long handle sponge) to be used during self care tasks during next session.       OT scores     Eating  Assistance Needed: Independent  Comment: with increased time per pt report  CARE Score: 6  Discharge Goal: Independent  Oral Hygiene  Assistance Needed: Supervision or touching assistance  CARE Score: 4  Discharge Goal: [address] needed: Partial/moderate assistance  CARE Score: 3  Discharge Goal: Independent  Shower/Bathe Self  Assistance Needed: Partial/moderate assistance  CARE Score: 3  Discharge Goal: Independent  Upper Body Dressing  Assistance Needed: Partial/moderate assistance  CARE Score: 3  Discharge Goal: Independent  Lower Body Dressing  Assistance Needed: Substantial/maximal assistance  CARE Score: 2  Discharge Goal: Independent  Putting On/Taking Off Footwear  Assistance Needed: Dependent  CARE Score: 1  Discharge Goal: Independent  Toilet Transfer  Assistance needed: Partial/moderate assistance  CARE Score: 3  Discharge Goal: Independent    Goals  Patient Goals   Patient goals : Kavin Cheng now just get back to normal and hope that my pain is gone from my back. \"  Short Term Goals  Time Frame for Short term goals: By 1 week  Short Term Goal 1: Pt will complete lower body dressing/bathing with SBA and Good safety while maintaining back precautions  Short Term Goal 2: Pt will complete functional transfers/mobility during self care tasks with SBA and Good safety  Short Term Goal 3: Pt will complete upper body dressing with SBA and Good safety and compliance of back precautions  Short Term Goal 4: Pt will tolerate standing 5+ minutes during functional activity of choice with Good safety  Short Term Goal 5: Pt will verbalize/demonstrate good understanding AE/adaptive strategies/DME to assist in maintaining back precautions to increase safety and independence with self care and mobility  Short Term Goal 6: Pt will participate in 30+ minutes of theraputic exercises/functional activites to increase safety and independence with self care and mobility  Long Term Goals  Time Frame for Long term goals : By discharge  Long Term Goal 1: Pt will complete BADLs with modified independence and Good safety while maintaining back precautions  Long Term Goal 2: Pt will complete functional transfers/mobility during self care tasks with modified independence with Good safety  Long Term Goal 3: Pt will tolerate standing 10+ minutes during functional activity of choice with Good safety  Long Term Goal 4: Pt will complete simple meal prep/light house keeping task with Supervision and Good safety  Long Term Goal 5: Pt will verbalize/demonstrate Good understanding of home safety/fall prevention strategies to increase safety and independence with self care and mobility  Long Term Goal 6: Pt will demonstrate increased  strength during activities of daily living in R hand as evident by 5# improved  strength    Assessment  Performance deficits / Impairments: Decreased ADL status; Decreased functional mobility ; Decreased ROM; Decreased strength;Decreased safe awareness;Decreased endurance;Decreased balance;Decreased vision/visual deficit; Decreased high-level IADLs  Treatment Diagnosis: Impaired self care status  Prognosis: Good  Decision Making: Medium Complexity  Discharge Recommendations: Home with assist PRN;Patient would benefit from continued therapy after discharge  Plan  Times per Week: 5-7  Times per Day: Twice a day  Current Treatment Recommendations: Self-Care / ADL; Strengthening;ROM;Balance training;Functional mobility training; Endurance training;Pain management; Safety education & training;Patient/Caregiver education & training;Equipment evaluation, education, & procurement;Home management training          04/24/22 0940 04/24/22 1413   OT Individual Minutes   Time In 3070 [id number]   Time Out [id number]   [id number]   Time Code Minutes    Timed Code Treatment Minutes 44 Minutes 34 Minutes     Beti Holley OT

## 2022-04-24 NOTE — H&P
Physical Medicine & Rehabilitation History and Physical  [facility] Acute Rehabilitation Unit     Primary care provider: Cecilio Stout MD     Chief Complaint and Reason for Rehabilitation Admission:   Ambulatory and ADL dysfunction secondary to stenosis status post L2-5 laminectomy with L4-5 fusion on 4/18    History of Present Illness:  Radha Last  is a 66 y.o.   female admitted to the [address] [place] [address] on 4/23/2022.         26-year-old female with history of previous back surgeries had progressively worsening low back pain for several years. Notes worsening with walking. Weakness to lower extremities with increased activity. She has tried aqua therapy. Also history of polio with residual right foot drop. She uses a walker to assist with ambulation. She was noted to have spinal stenosis L4-L5 level. Underwent L2-5 laminectomy and interbody fusion L4-5. Expandable disc spacer interbody L4-5. Postop developed hypoxia. Pulmonary Dilip Andes is poor effort of amatory and hypoventilation. Placed on 3 L oxygen. On heparin for DVT prophylaxis    She is currently requiring assistance for self-care activities and mobility prompting this admission. Some nausea this morning. BM few days ago. Premorbid function:  Modified independent with rolling walker  Current Function:  PT:  Restrictions/Precautions: Surgical Protocols,Fall Risk,General Precautions  Implants present? : Metal implants (Back surgery, R ankle surgery)  Other position/activity restrictions: No lifting greater than 1-2 lbs for 14 days  Required Braces or Orthoses  Spinal: Lumbar Corset  Right Lower Extremity Brace:  Ankle Foot Orthotics (Does not use)   Transfers  Sit to Stand: Minimal Assistance  Stand to sit: Minimal Assistance  Bed to Chair: Minimal assistance  Comment: Transfers with rolling walker, needs cues for hand placement  Ambulation  Surface: level tile  Device: Rolling Walker  Assistance: Contact guard assistance,Minimal assistance  Quality of Gait: WBOS, R Decreased foot clearance, L Decreased foot clearance, Decreased claire, Forward trunk, R Foot flat, L Foot flat  Gait Deviations: Increased YASMANI,Decreased step length,Decreased step height,Slow Claire  Distance: 45 ft x2 (2MWT), pt had to stop after walking 45 ft (1 minute) and sit down due to vomiting. Pt gets up again and ambulates another 45 ft (1minute)  Comments: Fatiques easily, nausea and vomiting limiting activity.         OT: Pending, prior to transfer max assist toileting and bathing  ST:         Past Medical History:      Diagnosis Date    Arthritis     Chronic back pain     ddd    GERD (gastroesophageal reflux disease)     Hyperlipidemia     Hypertension     MVP (mitral valve prolapse)     Polio     right leg at age 2       Past Surgical History:      Procedure Laterality Date    BACK SURGERY      BREAST SURGERY      COLONOSCOPY      HEMORRHOID SURGERY  1988    HYSTERECTOMY      LEG SURGERY      fused right heel and right leg lengthened-due to polio    TONSILLECTOMY         Allergies:    Crestor [rosuvastatin], Darvocet-n 100 [propoxyphene n-acetaminophen], Equagesic, Lipitor, and Relafen [nabumetone]    Medications   Scheduled Meds:   polyethylene glycol  17 g Oral Daily    albuterol sulfate HFA  2 puff Inhalation 4x daily    amLODIPine  10 mg Oral Daily    dorzolamide-timolol  1 drop Both Eyes BID    montelukast  10 mg Oral Daily    mupirocin   Topical BID    rosuvastatin  5 mg Oral Nightly    vitamin D  50,000 Units Oral Weekly    heparin (porcine)  5,000 Units SubCUTAneous 3 times per day    lidocaine  2 patch TransDERmal Daily    pantoprazole  40 mg Oral QAM AC     Continuous Infusions:  PRN Meds:.ondansetron, acetaminophen, senna, bisacodyl, tiZANidine, traMADol       Diagnostics:       CBC:   Recent Labs     04/24/22  0609   WBC 10.6   RBC 3.82*   HGB 11.6*   HCT 34.5*   MCV 90.2   RDW 14.2        BMP: Recent Labs     04/24/22  0609      K 4.2      CO2 26   BUN 25*   CREATININE 1.17*     BNP: No results for input(s): BNP in the last 72 hours. PT/INR: No results for input(s): PROTIME, INR in the last 72 hours. APTT: No results for input(s): APTT in the last 72 hours. CARDIAC ENZYMES: No results for input(s): CKMB, CKMBINDEX, TROPONINT in the last 72 hours. Invalid input(s): CKTOTAL;3  FASTING LIPID PANEL:  Lab Results   Component Value Date    CHOL 223 (H) 07/19/2016    HDL 38 (L) 07/17/2017    TRIG 314 (H) 07/19/2016     LIVER PROFILE: No results for input(s): AST, ALT, ALB, BILIDIR, BILITOT, ALKPHOS in the last 72 hours. I/O (24Hr): No intake or output data in the 24 hours ending 04/24/22 1104    Glu last 24 hour  No results for input(s): POCGLU in the last 72 hours. No results for input(s): CLARITYU, COLORU, PHUR, SPECGRAV, PROTEINU, RBCUA, BLOODU, BACTERIA, NITRU, WBCUA, LEUKOCYTESUR, YEAST, GLUCOSEU, BILIRUBINUR in the last 72 hours. Social History:  One-story home with steps to enter, use rolling walker at home has had 2 falls prior to admission, 2 sons live locally, states  can assist.    Family History:       Problem Relation Age of Onset    Cancer Mother         breast    Heart Disease Father        Review of Systems:  CONSTITUTIONAL:  Denies fevers, chills, sweats or fatigue. EYES:  Denies diplopia, blind spots, blurring. HEENT:  Denies hearing loss, trouble chewing or swallowing. RESPIRATORY:  No wheezing, coughing, shortness of breath. CARDIOVASCULAR:  Denies chest pain, palpitations, lightheadedness. GASTROINTESTINAL:  Denies heartburn, nausea, constipation, diarrhea, abdominal pain. GENITOURINARY:  No urgency, frequency, incontinence, dysuria. ENDOCRINE:  Denies hot or cold intolerance. MUSCULOSKELETAL:  Denies focal joint pain, back pain, neck pain. NEUROLOGICAL:  Denies focal weakness, numbness, tingling, balance loss, headache.   BEHAVIOR/PSYCH: Denies depression, anxiety, memory loss, insomnia. SKIN:  No ulcers, rash, bruises. Physical Exam:  /62   Pulse 69   Temp 97.3 °F (36.3 °C) (Oral)   Resp 18   Ht 5' 4\" (1.626 m)   Wt 182 lb (82.6 kg)   SpO2 95%   BMI 31.24 kg/m²   HEENT:  Symmetrical facial features. EOMI. Visual fields intact. Hearing intact. Speech fluent, no dystarthria. Comprehension intact. Object naming intact. Repetition intact. Basic cognition intact. NECK:  ROM functional.  Carotid bruit negative. THORAX:  Symmetrical.    LUNGS:  Clear to ausculation. HEART:  Regular. No murmurs of gallops. ABDOMEN:  Non-distended. Normal bowel sounds x4. No guarding, tenderness, mass. BACK:  No ulcers or deformity. EXTREMITIES:  PROM within functional limits. No calf tenderness, edema. Feet warm. NEUROMUSCULAR:  Sensation intact question some numbness left foot, no extinction. Coordination smooth. Motor testing /5 upper and lower extremities except for right dorsiflexion 0/5-foot drop chronic. Balance impaired. SKIN:  intact. Incision clean and intact      Principal Diagnosis/plan:  Ambulatory and ADL dysfunction due to L4-5 stenosis status post L2-5 laminectomy and L4-5 fusion    She will benefit from intensive interdisciplinary therapies and rehab nursing care and is appropriate for inpatient rehabilitation. The post admission physician evaluation (GRAY) is consistent with the pre-admission assessment. See above findings to reflect the elements required in the GRAY. Patient's admitting condition is consistent with the findings of the preadmission assessment by the rehabilitation admissions coordinator. Other Diagnoses/plan:    1. Ambulatory and ADL dysfunction due to L4-5 stenosis status post L2-5 laminectomy and L4-5 fusion PT/OT/nursing work on transfer, ambulation, ADLs, steps, family training, pain management, home-going approxi-10 days at supervision level. Prognosis good  2.  Status post L2 5 laminectomy and L4-5 fusion chronic low back pain-monitor incision site patient notes some left foot numbness post surgery  3. Postop  Acute respiratory failure-currently on albuterol and Singulair  4. pain Ultram, Tylenol Zanaflex patch  5. Nausea -Abdominal exam negative no vomiting or abdominal discomfort, Zofran added, questionable medications,  postop, constipation -continue MiraLAX, given suppository Senokot  6. History of polio with right foot drop , right heel and right leg lengthining   7. Hyperlipidemia/hypertension Norvasc, Crestor  8. Mild increased stable BUN/creatinine-encourage fluids-monitor  9. Hemorrhoid surgery  10. Constipation MiraLAX, Senokot, as needed suppository  11. ID-Protonix  12. Skin -On Bactroban ointment-noted was on tacrolimus ointment at home? 13. Optho-Cosopt,  home med latanoprost discontinued? 14. Vitamin D supplementation-50 K q. weekly  15. DVT prophylaxis-DVT prophylaxis  16. Internal medicine for medical management    DVT Prophylaxis:  low molecular weight heparin, SCD's while in bed and NATHAN's while in bed    Estimated Length of Stay:  2 weeks. Prognosis  fair    Goals    Home at Supervision   Intermittent      Flory Holt. Iqra Maradiaga MD       This note is created with the assistance of a speech recognition program.  While intending to generate a document that actually reflects the content of the visit, the document can still have some errors including those of syntax and sound a like substitutions which may escape proof reading.   In such instances, actual meaning can be extrapolated by contextual diversion

## 2022-04-24 NOTE — CONSULTS
[facility]    CONSULTATION    / FOLLOW UP VISIT       Date:   4/24/2022  Patient name:  Yue Garcia  Date of admission:  4/23/2022 12:09 PM  MRN:   [id number]  Account:  [de-identified]  YOB: 1950  PCP:    Melchor Savage MD  Room:   [facility]  Code Status:    Full Code    Physician Requesting Consult: Ulysses Bally, MD    History of Present Illness:      C/C ;  Medical comorbidity management     REASON FOR CONSULT;  Medical comorbidity and medication management ;                                                 *Principal Problem:    Debility  Active Problems:    Non-intractable vomiting with nausea    Status post lumbar spine surgery for decompression of spinal cord    HTN (hypertension)    Hypertriglyceridemia  Resolved Problems:    * No resolved hospital problems. *           HPI;    Patient admitted to [facility] s/p lumbar spine decompression for lumbar spine stenosis  She had nausea and a few vomits this morning  Feeling better  Has been started on Protonix  No significant history of peptic ulcer disease in the past        On admission tAc rehab  41-year-old female with history of previous back surgeries had progressively worsening low back pain for several years. Notes worsening with walking. Weakness to lower extremities with increased activity. She has tried aqua therapy. Also history of polio with residual right foot drop. She uses a walker to assist with ambulation. She was noted to have spinal stenosis L4-L5 level. Underwent L2-5 laminectomy and interbody fusion L4-5. Expandable disc spacer interbody L4-5. Postop developed hypoxia. Pulmonary Doyal Sera is poor effort of amatory and hypoventilation. Placed on 3 L oxygen.   On heparin for DVT prophylaxis             Past Medical History:   Diagnosis Date    Arthritis     Chronic back pain     ddd    GERD (gastroesophageal reflux disease)     Hyperlipidemia     Hypertension     MVP (mitral valve prolapse)     Polio     right leg at age 2     Social History     Tobacco Use    Smoking status: Never Smoker    Smokeless tobacco: Never Used   Vaping Use    Vaping Use: Never used   Substance Use Topics    Alcohol use: No     Alcohol/week: 0.0 standard drinks    Drug use: No      Social History:     Tobacco:    reports that she has never smoked. She has never used smokeless tobacco.  Alcohol:      reports no history of alcohol use. Drug Use:  reports no history of drug use. Review of Systems:     POSITIVE AND NEGATIVES AS DESCRIBED IN HISTORY OF PRESENT ILLNESS ;  IN ADDITION ;  Review of Systems          All other systems negative                Physical Exam:     Physical Exam   Vitals:    04/23/22 1445 04/23/22 1930 04/23/22 2017 04/24/22 0808   BP: 129/83 135/68  102/62   Pulse: 78 87  69   Resp: 18 18 18 18   Temp: 98.2 °F (36.8 °C) 97.7 °F (36.5 °C)  97.3 °F (36.3 °C)   TempSrc: Oral Oral  Oral   SpO2: 96% 93% 95% 95%   Weight: 182 lb (82.6 kg)      Height: 5' 4\" (1.626 m)                      Body mass index is 31.24 kg/m². General Appearance:   -, CO-OPERATIVE ,                                                        Pulmonary/Chest:        Clear to auscultation bilaterally . No wheezes, rales or rhonchi . Cardiovascular:            Normal rate, regular rhythm,                                          No murmur or  Gallop . Abdomen:                       Soft, non-tender                                                                                    Extremities:                    No Edema . Neuromuskuloskeletal    .. [facility] ;                 No focal motor deficit ,                 No focal sensory deficit ,    Musculo-skeletal ;                Sensation intact question some numbness left foot, no extinction. Coordination smooth.   Motor testing /5 upper and lower extremities except for right dorsiflexion 0/5-foot drop chronic. Balance impaired   ,                   Psych:   ---                     Data:     Significant last 24 hr data reviewed ;   Vitals:    04/23/22 1445 04/23/22 1930 04/23/22 2017 04/24/22 0808   BP: 129/83 135/68  102/62   Pulse: 78 87  69   Resp: 18 18 18 18   Temp: 98.2 °F (36.8 °C) 97.7 °F (36.5 °C)  97.3 °F (36.3 °C)   TempSrc: Oral Oral  Oral   SpO2: 96% 93% 95% 95%   Weight: 182 lb (82.6 kg)      Height: 5' 4\" (1.626 m)         Recent Results (from the past 24 hour(s))   Basic Metabolic Panel w/ Reflex to MG    Collection Time: 04/24/22  6:09 AM   Result Value Ref Range    Glucose 109 (H) 70 - 99 mg/dL    BUN 25 (H) 8 - 23 mg/dL    CREATININE 1.17 (H) 0.50 - 0.90 mg/dL    Calcium 9.7 8.6 - 10.4 mg/dL    Sodium 138 135 - 144 mmol/L    Potassium 4.2 3.7 - 5.3 mmol/L    Chloride 101 98 - 107 mmol/L    CO2 26 20 - 31 mmol/L    Anion Gap 11 9 - 17 mmol/L    GFR Non-African American 45 (L) >60 mL/min    GFR  55 (L) >60 mL/min    GFR Comment         CBC auto differential    Collection Time: 04/24/22  6:09 AM   Result Value Ref Range    WBC 10.6 3.5 - 11.0 k/uL    RBC 3.82 (L) 4.0 - 5.2 m/uL    Hemoglobin 11.6 (L) 12.0 - 16.0 g/dL    Hematocrit 34.5 (L) 36 - 46 %    MCV 90.2 80 - 100 fL    MCH 30.3 26 - 34 pg    MCHC 33.6 31 - 37 g/dL    RDW 14.2 11.5 - 14.9 %    Platelets 613 798 - 391 k/uL    MPV 7.6 6.0 - 12.0 fL    Seg Neutrophils 57 36 - 66 %    Lymphocytes 32 24 - 44 %    Monocytes 8 (H) 1 - 7 %    Eosinophils % 2 0 - 4 %    Basophils 1 0 - 2 %    Segs Absolute 6.10 1.3 - 9.1 k/uL    Absolute Lymph # 3.40 1.0 - 4.8 k/uL    Absolute Mono # 0.90 0.1 - 1.3 k/uL    Absolute Eos # 0.20 0.0 - 0.4 k/uL    Basophils Absolute 0.10 0.0 - 0.2 k/uL     No results for input(s): POCGLU in the last 72 hours. No results found. Radiology:         Medications: Allergies:     Allergies   Allergen Reactions    Crestor [Rosuvastatin] Other (See Comments)     Leg aches    Darvocet-N 100 [Propoxyphene N-Acetaminophen]     Equagesic     Lipitor Other (See Comments)     Leg aches    Relafen [Nabumetone]        Current Meds:   Scheduled Meds:    polyethylene glycol  17 g Oral Daily    albuterol sulfate HFA  2 puff Inhalation 4x daily    amLODIPine  10 mg Oral Daily    dorzolamide-timolol  1 drop Both Eyes BID    montelukast  10 mg Oral Daily    mupirocin   Topical BID    rosuvastatin  5 mg Oral Nightly    vitamin D  50,000 Units Oral Weekly    heparin (porcine)  5,000 Units SubCUTAneous 3 times per day    lidocaine  2 patch TransDERmal Daily    pantoprazole  40 mg Oral QAM AC     Continuous Infusions:   PRN Meds: ondansetron, acetaminophen, senna, bisacodyl, tiZANidine, traMADol        Assessment :       Assessment Dx  Principal Problem:    Debility  Active Problems:    Non-intractable vomiting with nausea    Status post lumbar spine surgery for decompression of spinal cord    HTN (hypertension)    Hypertriglyceridemia  Resolved Problems:    * No resolved hospital problems. *              Plan:     Patient hypertension  On amlodipine   Also on atorvastatin for hyperlipidemia   Has albuterol ordered for as needed use  Complained of nausea and had emesis this morning  Started on Protonix  Feeling better at present  Will monitor  Hemoglobin 11.7 on admission creatinine 1.17 all other labs okay       Thanks for consulting us . Will monitor vitals and clinical course , and  Optimize therapy  as needed . Edith Webb MD    Copy sent to Dr. Lena Titus MD    Pleasenote that this chart was generated using voice recognition Dragon dictation software. Although every effort was made to ensure the accuracy of this automated transcription, some errors in transcription may have occurred.

## 2022-04-24 NOTE — PROGRESS NOTES
Comprehensive Nutrition Assessment    Type and Reason for Visit:  Consult (Eval & treat)    Nutrition Recommendations/Plan:   1. Continue diet as ordered. Malnutrition Assessment:  Malnutrition Status:  No malnutrition (04/24/22 1446)    Context:  Acute Illness     Findings of the 6 clinical characteristics of malnutrition:  Energy Intake:  Mild decrease in energy intake (Comment)  Weight Loss:  No significant weight loss     Body Fat Loss:  No significant body fat loss     Muscle Mass Loss:  No significant muscle mass loss    Fluid Accumulation:  No significant fluid accumulation     Strength:  Not Performed    Nutrition Assessment:    Pt has hx of spinal stenosis L 4-5, had L2-5 laminectomy with L 4-5 fusion. Pt said her appetite isn't great and actually vomited which she thinks is related to medications she is on. Pt states my weight has stayed at 183# for the last 4 years. Nutrition Related Findings:    No edema. Hx polio wtih foot drop. Labs & meds reviewed. Wound Type: Surgical Incision       Current Nutrition Intake & Therapies:    Average Meal Intake: 51-75% (less today due to vomiting)     ADULT DIET; Regular    Anthropometric Measures:  Height: 5' 4\" (162.6 cm)  Ideal Body Weight (IBW): 120 lbs (55 kg)    Admission Body Weight: 182 lb (82.6 kg)  Current Body Weight: 182 lb (82.6 kg), 151.7 % IBW. Weight Source: Bed Scale  Current BMI (kg/m2): 31.2  Usual Body Weight: 183 lb (83 kg)  % Weight Change (Calculated): -0.5   BMI Categories: Obese Class 1 (BMI 30.0-34. 9)    Estimated Daily Nutrient Needs:  Energy Requirements Based On: Formula  Weight Used for Energy Requirements: Admission  Energy (kcal/day): 1719  Weight Used for Protein Requirements: Ideal  Protein (g/day): 76.3     Nutrition Diagnosis:   · Inadequate oral intake related to altered GI function as evidenced by vomiting      Nutrition Interventions:   Food and/or Nutrient Delivery: Continue Current Diet  Nutrition Education/Counseling: Education not indicated  Coordination of Nutrition Care: Continue to monitor while inpatient       Goals:     Goals: Meet at least 75% of estimated needs       Nutrition Monitoring and Evaluation:   Behavioral-Environmental Outcomes: None Identified  Food/Nutrient Intake Outcomes: Food and Nutrient Intake  Physical Signs/Symptoms Outcomes: Biochemical Data,GI Status,Nausea or Vomiting,Nutrition Focused Physical Findings,Skin,Weight    Discharge Planning:    Continue current diet     Ninette July, 65 [address], [place] [facility]8

## 2022-04-25 PROCEDURE — 6360000002 HC RX W HCPCS: Performed by: PHYSICAL MEDICINE & REHABILITATION

## 2022-04-25 PROCEDURE — 97110 THERAPEUTIC EXERCISES: CPT

## 2022-04-25 PROCEDURE — 99232 SBSQ HOSP IP/OBS MODERATE 35: CPT | Performed by: PHYSICAL MEDICINE & REHABILITATION

## 2022-04-25 PROCEDURE — 6370000000 HC RX 637 (ALT 250 FOR IP): Performed by: PHYSICAL MEDICINE & REHABILITATION

## 2022-04-25 PROCEDURE — 1180000000 HC REHAB R&B

## 2022-04-25 PROCEDURE — 97530 THERAPEUTIC ACTIVITIES: CPT

## 2022-04-25 PROCEDURE — 97535 SELF CARE MNGMENT TRAINING: CPT

## 2022-04-25 PROCEDURE — 99231 SBSQ HOSP IP/OBS SF/LOW 25: CPT | Performed by: INTERNAL MEDICINE

## 2022-04-25 PROCEDURE — 97116 GAIT TRAINING THERAPY: CPT

## 2022-04-25 RX ORDER — LATANOPROST 50 UG/ML
1 SOLUTION/ DROPS OPHTHALMIC NIGHTLY
Status: DISCONTINUED | OUTPATIENT
Start: 2022-04-25 | End: 2022-05-04 | Stop reason: HOSPADM

## 2022-04-25 RX ORDER — ALBUTEROL SULFATE 90 UG/1
2 AEROSOL, METERED RESPIRATORY (INHALATION) EVERY 6 HOURS PRN
Status: DISCONTINUED | OUTPATIENT
Start: 2022-04-25 | End: 2022-05-04 | Stop reason: HOSPADM

## 2022-04-25 RX ADMIN — PANTOPRAZOLE SODIUM 40 MG: 40 TABLET, DELAYED RELEASE ORAL at 05:53

## 2022-04-25 RX ADMIN — HEPARIN SODIUM 5000 UNITS: 5000 INJECTION INTRAVENOUS; SUBCUTANEOUS at 05:53

## 2022-04-25 RX ADMIN — HEPARIN SODIUM 5000 UNITS: 5000 INJECTION INTRAVENOUS; SUBCUTANEOUS at 14:33

## 2022-04-25 RX ADMIN — POLYETHYLENE GLYCOL 3350 17 G: 17 POWDER, FOR SOLUTION ORAL at 07:31

## 2022-04-25 RX ADMIN — HEPARIN SODIUM 5000 UNITS: 5000 INJECTION INTRAVENOUS; SUBCUTANEOUS at 21:09

## 2022-04-25 RX ADMIN — TRAMADOL HYDROCHLORIDE 50 MG: 50 TABLET, COATED ORAL at 03:22

## 2022-04-25 RX ADMIN — DORZOLAMIDE HYDROCHLORIDE AND TIMOLOL MALEATE 1 DROP: 20; 5 SOLUTION/ DROPS OPHTHALMIC at 07:33

## 2022-04-25 RX ADMIN — MONTELUKAST SODIUM 10 MG: 10 TABLET, FILM COATED ORAL at 07:31

## 2022-04-25 RX ADMIN — TRAMADOL HYDROCHLORIDE 50 MG: 50 TABLET, COATED ORAL at 22:37

## 2022-04-25 RX ADMIN — ONDANSETRON HYDROCHLORIDE 4 MG: 4 TABLET, FILM COATED ORAL at 05:53

## 2022-04-25 RX ADMIN — AMLODIPINE BESYLATE 10 MG: 10 TABLET ORAL at 07:31

## 2022-04-25 RX ADMIN — TIZANIDINE 4 MG: 4 TABLET ORAL at 22:37

## 2022-04-25 RX ADMIN — ROSUVASTATIN CALCIUM 5 MG: 10 TABLET, FILM COATED ORAL at 21:09

## 2022-04-25 RX ADMIN — TRAMADOL HYDROCHLORIDE 50 MG: 50 TABLET, COATED ORAL at 14:33

## 2022-04-25 RX ADMIN — LATANOPROST 1 DROP: 50 SOLUTION OPHTHALMIC at 23:00

## 2022-04-25 RX ADMIN — TIZANIDINE 4 MG: 4 TABLET ORAL at 12:07

## 2022-04-25 RX ADMIN — MUPIROCIN: 20 OINTMENT TOPICAL at 21:09

## 2022-04-25 RX ADMIN — TIZANIDINE 4 MG: 4 TABLET ORAL at 03:22

## 2022-04-25 ASSESSMENT — PAIN DESCRIPTION - LOCATION
LOCATION: BACK
LOCATION: BACK;BUTTOCKS

## 2022-04-25 ASSESSMENT — PAIN SCALES - GENERAL
PAINLEVEL_OUTOF10: 4
PAINLEVEL_OUTOF10: 4
PAINLEVEL_OUTOF10: 2
PAINLEVEL_OUTOF10: 3
PAINLEVEL_OUTOF10: 3
PAINLEVEL_OUTOF10: 5

## 2022-04-25 ASSESSMENT — PAIN DESCRIPTION - PAIN TYPE: TYPE: SURGICAL PAIN

## 2022-04-25 ASSESSMENT — PAIN DESCRIPTION - DESCRIPTORS
DESCRIPTORS: ACHING
DESCRIPTORS: ACHING;NUMBNESS
DESCRIPTORS: ACHING

## 2022-04-25 ASSESSMENT — PAIN - FUNCTIONAL ASSESSMENT
PAIN_FUNCTIONAL_ASSESSMENT: PREVENTS OR INTERFERES SOME ACTIVE ACTIVITIES AND ADLS
PAIN_FUNCTIONAL_ASSESSMENT: ACTIVITIES ARE NOT PREVENTED

## 2022-04-25 ASSESSMENT — PAIN DESCRIPTION - ORIENTATION
ORIENTATION: LOWER
ORIENTATION: RIGHT
ORIENTATION: LOWER

## 2022-04-25 ASSESSMENT — PAIN DESCRIPTION - FREQUENCY: FREQUENCY: INTERMITTENT

## 2022-04-25 NOTE — PROGRESS NOTES
Physical Medicine & Rehabilitation  Progress Note      Subjective:      66year-old with lumbar stenosis who is s/p L2-5 laminectomy and L4-5 fusion. Patient is having problems with constipation and pain in the low back, requiring pain medications    ROS:  Denies fevers, chills, sweats. No chest pain, palpitations, lightheadedness. Denies coughing, wheezing or shortness of breath. Denies abdominal pain, nausea, diarrhea. No new areas of joint pain. Denies new areas of numbness or weakness. Denies new anxiety or depression issues. No new skin problems. Rehabilitation:   Progressing in therapies. PT:  Restrictions/Precautions: Surgical Protocols,Fall Risk,General Precautions  Implants present? : Metal implants (Back surgery, R ankle surgery)  Other position/activity restrictions: No lifting greater than 1-2 lbs for 14 days  Required Braces or Orthoses  Spinal: Lumbar Corset  Right Lower Extremity Brace: Ankle Foot Orthotics (Does not used, asked if family can bring here to assess.)   Transfers  Sit to Stand: Minimal Assistance  Stand to sit: Minimal Assistance  Bed to Chair: Minimal assistance  Comment: Transfers with rolling walker, needs cues for hand placement  Ambulation  Surface: level tile  Device: Rolling Walker  Assistance: Contact guard assistance,Minimal assistance  Quality of Gait: WBOS, R Decreased foot clearance, L Decreased foot clearance, Decreased claire, Forward trunk, R Foot flat, L Foot flat  Gait Deviations: Increased YASMANI,Decreased step length,Decreased step height,Slow Claire  Distance: 45 ft x2 (2MWT), pt had to stop after walking 45 ft (1 minute) and sit down due to vomiting. Pt gets up again and ambulates another 45 ft (1minute)  Comments: Fatiques easily, nausea and vomiting limiting activity.     Transfers  Sit to Stand: Minimal Assistance  Stand to sit: Minimal Assistance  Bed to Chair: Minimal assistance  Comment: Transfers with rolling walker, needs cues for hand placement Ambulation  Surface: level tile  Device: Rolling Walker  Assistance: Contact guard assistance,Minimal assistance  Quality of Gait: WBOS, R Decreased foot clearance, L Decreased foot clearance, Decreased claire, Forward trunk, R Foot flat, L Foot flat  Gait Deviations: Increased YASMANI,Decreased step length,Decreased step height,Slow Claire  Distance: 45 ft x2 (2MWT), pt had to stop after walking 45 ft (1 minute) and sit down due to vomiting. Pt gets up again and ambulates another 45 ft (1minute)  Comments: Fatiques easily, nausea and vomiting limiting activity. Surface: level tile  Ambulation  Surface: level tile  Device: Rolling Walker  Assistance: Contact guard assistance,Minimal assistance  Quality of Gait: WBOS, R Decreased foot clearance, L Decreased foot clearance, Decreased claire, Forward trunk, R Foot flat, L Foot flat  Gait Deviations: Increased YASMANI,Decreased step length,Decreased step height,Slow Claire  Distance: 45 ft x2 (2MWT), pt had to stop after walking 45 ft (1 minute) and sit down due to vomiting. Pt gets up again and ambulates another 45 ft (1minute)  Comments: Fatiques easily, nausea and vomiting limiting activity. OT:  ADL  Feeding: Modified independent   Feeding Skilled Clinical Factors: per pt report with increased time  Grooming: Stand by assistance  Grooming Skilled Clinical Factors: sitting EOB  UE Bathing: Stand by assistance  UE Bathing Skilled Clinical Factors: Sitting on tub bench with verbal cues for back precautions  LE Bathing: Moderate assistance  LE Bathing Skilled Clinical Factors: A with bilateral lower legs/feet and bottom  UE Dressing:  Moderate assistance  UE Dressing Skilled Clinical Factors: A with doffing/donning back brace  LE Dressing: Maximum assistance  LE Dressing Skilled Clinical Factors: A with doffing/donning bilateral socks and A with threading LLE with increased time and verbal cues for back precautions  Toileting: Minimal assistance  Toileting Skilled Clinical Factors: A for bottom hygiene  Additional Comments: OT facilitated pts engagement in full shower on this date with use of tub bench, grab bars, and hand held shower head. Lunbar corset doffed while sitting on tub bench. Pt required A with all lower body self care tasks at this time. Pt currently limited due to decerased strength, balance, activity tolerance, nausea, and pain limiting safety and independence with self care tasls. PM: OT introduced patient to AE (reacher, sock aid, and long handle sponge) to be used during self care tasks during next session. Balance  Sitting Balance: Stand by assistance  Standing Balance: Minimal assistance (Min A - CGA)   Standing Balance  Time: 1-2 minutes x 4  Activity: Functional transfers/mobility, self care tasks  Comment: Verbal cues for hand placement and safety  Functional Mobility  Functional - Mobility Device: Rolling Walker  Activity: To/from bathroom  Assist Level: Minimal assistance (min A - CGA)     Bed mobility  Rolling to Left: Minimal assistance  Rolling to Right: Minimal assistance  Supine to Sit: Minimal assistance  Sit to Supine: Moderate assistance  Scooting: Minimal assistance  Comment: Verbal cues for log rolling   Transfers  Sit to stand: Minimal assistance (min A -CGA)  Stand to sit: Contact guard assistance  Transfer Comments: Verbal cues for hand placement and safety   Toilet Transfers  Toilet - Technique: Ambulating  Equipment Used: Raised toilet seat with rails  Toilet Transfer: Minimal assistance  Toilet Transfers Comments: Verbal cues for hand placement and safety     Shower Transfers  Shower - Transfer From: Walker  Shower - Transfer Type: To and From  Shower - Transfer To:  Transfer tub bench  Shower - Technique: Ambulating  Shower Transfers: Minimal assistance  Shower Transfers Comments: Verbal cues for safety over threshold       SPEECH:      Objective:  /69   Pulse 67   Temp 97.9 °F (36.6 °C)   Resp 16   Ht 5' 4\" (1.626 m)   Wt 182 lb (82.6 kg)   SpO2 95%   BMI 31.24 kg/m²       GEN: well developed, well nourished, NAD  HEENT: NCAT, PERRL, EOMI, mucous membranes pink and moist  CV: RRR, no murmurs, rubs or gallops  PULM: CTAB, no rales or rhonchi. Respirations WNL and unlabored  ABD: soft, NT, ND, BS+ and hypoactive  NEURO: A&O x3. Sensation intact to light touch. DTRs 2+  MSK: Functional ROM BUEs, some weakness impairing AROM BLEs . Strength 5/5 key muscles BUEs, 4+/5 key muscles BLEs. EXTREMITIES: No calf tenderness to palpation bilaterally. No edema BLEs  SKIN: warm dry and intact with good turgor. Midline low back incision with dressing CD&I.   PSYCH: appropriately interactive. Affect WNL. Diagnostics:     CBC: Recent Labs     04/24/22  0609   WBC 10.6   RBC 3.82*   HGB 11.6*   HCT 34.5*   MCV 90.2   RDW 14.2        BMP:   Recent Labs     04/24/22  0609      K 4.2      CO2 26   BUN 25*   CREATININE 1.17*   GLUCOSE 109*     BNP: No results for input(s): BNP in the last 72 hours. PT/INR: No results for input(s): PROTIME, INR in the last 72 hours. APTT: No results for input(s): APTT in the last 72 hours. CARDIAC ENZYMES: No results for input(s): CKMB, CKMBINDEX, TROPONINT in the last 72 hours. Invalid input(s): CKTOTAL;3 troponins   FASTING LIPID PANEL:  Lab Results   Component Value Date    CHOL 223 (H) 07/19/2016    HDL 38 (L) 07/17/2017    TRIG 314 (H) 07/19/2016     LIVER PROFILE: No results for input(s): AST, ALT, ALB, BILIDIR, BILITOT, ALKPHOS in the last 72 hours.      Current Medications:   Current Facility-Administered Medications: albuterol sulfate  (90 Base) MCG/ACT inhaler 2 puff, 2 puff, Inhalation, Q6H PRN  ondansetron (ZOFRAN) tablet 4 mg, 4 mg, Oral, Q8H PRN  acetaminophen (TYLENOL) tablet 650 mg, 650 mg, Oral, Q4H PRN  polyethylene glycol (GLYCOLAX) packet 17 g, 17 g, Oral, Daily  senna (SENOKOT) tablet 17.2 mg, 2 tablet, Oral, Daily PRN  bisacodyl (DULCOLAX) suppository 10 mg, 10 mg, Rectal, Daily PRN  amLODIPine (NORVASC) tablet 10 mg, 10 mg, Oral, Daily  dorzolamide-timolol (COSOPT) 22.3-6.8 MG/ML ophthalmic solution 1 drop, 1 drop, Both Eyes, BID  montelukast (SINGULAIR) tablet 10 mg, 10 mg, Oral, Daily  mupirocin (BACTROBAN) 2 % ointment, , Topical, BID  rosuvastatin (CRESTOR) tablet 5 mg, 5 mg, Oral, Nightly  tiZANidine (ZANAFLEX) tablet 4 mg, 4 mg, Oral, Q6H PRN  traMADol (ULTRAM) tablet 50 mg, 50 mg, Oral, Q6H PRN  vitamin D (ERGOCALCIFEROL) capsule 50,000 Units, 50,000 Units, Oral, Weekly  heparin (porcine) injection 5,000 Units, 5,000 Units, SubCUTAneous, 3 times per day  lidocaine 4 % external patch 2 patch, 2 patch, TransDERmal, Daily  pantoprazole (PROTONIX) tablet 40 mg, 40 mg, Oral, QAM AC      Impression/Plan:   Impaired ADLs, gait, and mobility due to:      1. Debility after lumbar laminectomy and fusion for stenosis: Performed by Dr. Santillan Mole 4/18/22. PT/OT  for gait, mobility, strengthening, endurance, ADLs, and self care. Has Ultram, Tylenol, Zanaflex prn, Lidocaine patch. 2. Acute Respiratory Failure: Post-op. Improved. Has albuterol and Singulair - refusing per respiratory therapy  3. Hx polio with R foot drop: history of R heel and leg lengthening  4. HTN/HLD: on amlodipine, crestor  5. Nausea/vomiting: started on pantoprazole per IM. 6. Vitamin D deficiency: on weekly repletion  7. Bowel Management: Miralax daily, Senokot prn, Dulcolax prn - recommend suppository today  8. Internal medicine for medical management  9. DVT prophylaxis:  On heparin, SCDs and NATHAN stockings      Electronically signed by Alejandrina Covarrubias MD on 4/25/2022 at 9:42 AM      This note is created with the assistance of a speech recognition program.  While intending to generate a document that actually reflects the content of the visit, the document can still have some errors including those of syntax and sound a like substitutions which may escape proof reading.   In such instances, actual meaning can be extrapolated by contextual diversion.

## 2022-04-25 NOTE — CARE COORDINATION
Carmen Youngblood RN   Registered Nurse   Case Management   Progress Notes       Signed   Date of Service:  2022  7:20 PM                 Andie Aguilar [address]  Acute Inpatient Rehab Preadmission Assessment     Patient Name: Cherie Bender        MRN:   [id number]    : 1950  (66 y.o.)  Gender: female      Admitted from:   []?[place]  []? [address]   []? [address]   []? [facility]   [x]? Outside Admission - Location: [facility]                                 [x]? Initial         []? Updated     Date of Onset / Admission to the acute hospital:  22     Inpatient Rehabilitation Admitting Diagnosis:  Debility     Did patient have surgery/procedures? []? No  [x]? Yes:       22  L2-5 laminectomy with L4-5 fusion      Physicians: Deloris (NS), Jase (PMR)     Risk for clinical complications: Moderate     Co-morbidities:       1. HTN  2. HLD  3. DDD  4. Chronic reactive otitis externa  5. Leg length discrepancy  6. Hx polio with foot drop     Financial Information  Primary insurance:  [x]? Medicare     []? Medicare HMO      []? [employer]    []? Medicaid      []? Medicaid HMO       []? Workers Compensation        []? Personal Pay     Secondary Insurance:  []? Medicare     []? Medicare HMO      [x]? [employer]    []? Medicaid      []? Medicaid HMO        []? Workers Compensation      []? None     Precautions:   []? Cardiac Precautions:            []? Total hip precautions:           []? Weight Bearing status:  [x]? Safety Precautions/Concerns:  Fall Risk, General Precautions, Spinal Precautions, Lumbar brace   [x]? Visually impaired:   Blind L eye        []? Hard of Hearing:      Isolation Precautions:         []? Yes              [x]? No  If Yes:   []? Droplet  []? Contact           []? Airborne     []? VRE     []? MRSA        []? C-diff         []? TB             []? ESBL         []? MDRO          []? Other:                        Physiatrist:  []? Dr. Genesis Lee     [x]? Dr. Josselyn Otto  []?     Danny Wood  []? Dr. Sekou Evans     Patients Occupation: Retired     Reviewed Lab and Diagnostic reports from Current Admission: Yes     Patients Prior Functional  Level: Independent with ADLs, functional transfers, and gait     History of current illness, per PM&R Chart Review:  RHD Patient with history spinal stenosis at L4-5. Dr. Richie Rosario performed L2-5 laminectomy with L4-5 fusion on 4/18/22. She had some hypoxemia post operatively and was managed in step down. Treated for post op acute respiratory failure secondary to poor inspiratory effort, atelectasis, and hypoventilation.      Prognosis: Fair     Current functional status for upper extremity ADLs:  Min A     Current functional status for lower extremity ADLs:  Dependent     Current functional status for bed, chair, wheelchair transfers:  Min A      Current functional status for toilet transfers:  Min A     Current functional status for locomotion:  15' RW Min A      Current functional status for comprehension: TBD     Current functional status for expression: TBD     Current functional status for social interaction: TBD     Current functional status for problem solving: TBD     Current functional status for memory: TBD     Current Deficits R/T Impairment: Impaired Functional Mobility and Decreased ADLs     Required Therapy:   [x]? Physical Therapy  [x]? Occupational Therapy   [x]? Speech Therapy, as appropriate     Additional Services:    [x]?     []? Recreational Therapy, as appropriate    [x]? Nutrition    []? Dialysis  []? Cultural Needs Identified  []? Special Equipment Needs  [x]?  Other:  O2 as ordered     Rehab Justification:  Needs 3 hrs therapy per day or 15 hours per week:  Yes  Identified Rehab Nursing needs: Yes  Intense Interdisciplinary need:  Yes  Need for 24 hr physician supervision:  Yes  Measurable improved quality of life:  Yes  Willingness to participate:  Yes  Medical Necessity:  Yes  Patient able to tolerate care proposed:   Yes     Expected Discharge Destination/Functional Level:  Home with assist  Expected length of time to achieve that level of improvement: 1-2 weeks  Expected Post Discharge Treatments: Home with possible Home Care     Other information relevant to patient's care needs:  N/A     Acute Inpatient Rehabilitation Disclosure Statement will be provided to patient upon admission to ARU with patient's verbalization of understanding.       I have reviewed and concur with the findings and results of the pre-admission screening assessment completed by the Inpatient Rehabilitation Admissions Coordinator.                  Cosigned by: Bladimir Pimentel MD at 4/22/2022  7:43 PM

## 2022-04-25 NOTE — PLAN OF CARE
Problem: Safety - Adult  Goal: Free from fall injury  4/25/2022 0131 by Regan Yeager RN  Outcome: Progressing  Note: No falls or injuries sustained at this time. No attempts to get out of bed without nursing assistance. Call light within reach and pt. uses appropriately for assistance. Siderails up x 2. Nonskid footwear remains on. Bed in low and locked position. Hourly nursing rounds made. Pt. Alert and oriented, aware of limitations, and exhibits good safety judgement. Pt. uses assistive devices appropriately. Pt. understands individual fall risk factors. Problem: ABCDS Injury Assessment  Goal: Absence of physical injury  4/25/2022 0131 by Regan Yeager RN  Outcome: Progressing  Note: No falls or injuries sustained at this time. No attempts to get out of bed without nursing assistance. Call light within reach and pt. uses appropriately for assistance. Problem: Pain  Goal: Verbalizes/displays adequate comfort level or baseline comfort level  4/25/2022 0131 by Regan Yeager RN  Outcome: Progressing  Note: Pain assessment completed. Pt. able to rest.Patient medicated with PRN tramadol and zanaflex for pain this shift as ordered. .   Pt. Repositions per self for comfort. Nonverbal cues indicate pain relief. Pt. Rests quietly with eyes closed after pain medication administration. Respirations easy and unlabored. Appears free from distress. Problem: Skin/Tissue Integrity  Goal: Absence of new skin breakdown  Description: 1. Monitor for areas of redness and/or skin breakdown  2. Assess vascular access sites hourly  3. Every 4-6 hours minimum:  Change oxygen saturation probe site  4. Every 4-6 hours:  If on nasal continuous positive airway pressure, respiratory therapy assess nares and determine need for appliance change or resting period. 4/25/2022 0131 by Regan Yeager RN  Outcome: Progressing  Note: Skin assessment completed this shift.  Nutrition and Hydration status assessed with adequate intake. Daniel Score as charted. Bilateral heels remain elevated on pillows throughout the shift. Patient able to reposition self for comfort and to prevent breakdown. Skin integrity maintained. No new skin breakdown noted. Skin to high risk pressure areas including coccyx and heels are clear.

## 2022-04-25 NOTE — PROGRESS NOTES
[place] [id number] Internal Medicine    CONSULTATION / HISTORY AND PHYSICAL EXAMINATION            Date:   4/25/2022  Patient name:  Angie Cronin  Date of admission:  4/23/2022 12:09 PM  MRN:   [id number]  Account:  [de-identified]  YOB: 1950  PCP:    Elva Nicholson MD  Room:   [facility]-  Code Status:    Full Code    Physician Requesting Consult: Bj Hines MD    Reason for Consult: Medical management    Chief Complaint:     No chief complaint on file.     Debility    History Obtained From:     patient, electronic medical record    History of Present Illness/ Interval History:     s/p lumbar spine decompression for lumbar spine stenosis  27-year-old female with history of previous back surgeries had progressively worsening low back pain for several years.  Notes worsening with walking.  Weakness to lower extremities with increased activity.  She has tried aqua therapy.  Also history of polio with residual right foot drop.  She uses a walker to assist with ambulation.  She was noted to have spinal stenosis L4-L5 level.  Underwent L2-5 laminectomy and interbody fusion L4-5.  Expandable disc spacer interbody L4-5.  Postop developed hypoxia Placed on 3 L oxygen.  On heparin for DVT prophylaxis    Medical history includes hypertension hyperlipidemia GERD    HTN  Onset more than 2 years ago  Charity: mild to mod  Usually controlled with current po meds  Not associated with headaches or blurry vision  No chest pain      Past Medical History:     Past Medical History:   Diagnosis Date    Arthritis     Chronic back pain     ddd    GERD (gastroesophageal reflux disease)     Hyperlipidemia     Hypertension     MVP (mitral valve prolapse)     Polio     right leg at age 2        Past Surgical History:     Past Surgical History:   Procedure Laterality Date    BACK SURGERY      BREAST SURGERY      COLONOSCOPY      HEMORRHOID SURGERY  1988    HYSTERECTOMY      LEG SURGERY fused right heel and right leg lengthened-due to polio    TONSILLECTOMY          Medications Prior to Admission:     Prior to Admission medications    Medication Sig Start Date End Date Taking? Authorizing Provider   alendronate (FOSAMAX) 70 MG tablet Take 1 tablet by mouth every 7 days 7/19/16   Saul Ortiz MD   hydrocortisone (ANUSOL-HC) 2.5 % rectal cream Place rectally 2 times daily. 5/31/16   Leopoldo Counts, MD   simvastatin (ZOCOR) 20 MG tablet Take 1 tablet by mouth nightly 4/21/16   Saul Ortiz MD   losartan-hydrochlorothiazide [facility]) 100-25 MG per tablet Take 1 tablet by mouth daily 4/21/16   Saul Ortiz MD   clobetasol (TEMOVATE) 0.05 % cream Pt uses twice weekly 9/24/15   Saul Ortiz MD   Fluocinolone Acetonide 0.01 % OIL Place 1 drop in ear(s) nightly 7/7/15   Leopoldo Counts, MD   ibuprofen (ADVIL;MOTRIN) 800 MG tablet Take 800 mg by mouth every 8 hours as needed for Pain. 5/28/14 7/19/16  Leopoldo Counts, MD   timolol (TIMOPTIC-XR) 0.5 % ophthalmic gel-forming Place 1 drop into both eyes daily. 9/9/12   Historical Provider, MD   Multiple Vitamins-Minerals (PRESERVISION/LUTEIN) CAPS Take 1 capsule by mouth daily. Historical Provider, MD   Omeprazole-Sodium Bicarbonate (ZEGERID OTC)  MG CAPS Take  by mouth daily. Historical Provider, MD        Allergies:     Crestor [rosuvastatin], Darvocet-n 100 [propoxyphene n-acetaminophen], Equagesic, Lipitor, and Relafen [nabumetone]    Social History:     Tobacco:    reports that she has never smoked. She has never used smokeless tobacco.  Alcohol:      reports no history of alcohol use. Drug Use:  reports no history of drug use. Family History:     Family History   Problem Relation Age of Onset    Cancer Mother         breast    Heart Disease Father        Review of Systems:     Positive and Negative as described in HPI.     Denies any shortness of breath or cough  Denies chest pain or palpitations  Denies abdominal pain, diarrhea vomiting  Denies any new numbness tremors or weakness. Physical Exam:     /69   Pulse 67   Temp 97.9 °F (36.6 °C)   Resp 16   Ht 5' 4\" (1.626 m)   Wt 182 lb (82.6 kg)   SpO2 95%   BMI 31.24 kg/m²   Temp (24hrs), Av °F (36.7 °C), Min:97.9 °F (36.6 °C), Max:98.1 °F (36.7 °C)      General appearance:  alert, cooperative and no distress  Eyes: Anicteric sclera. Pupils are equally round and reactive to light. Extraocular movements are intact.   Lungs:  clear to auscultation bilaterally, normal effort  Heart:  regular rate and rhythm, no murmur  Abdomen:  soft, nontender, nondistended, normal bowel sounds, no masses, hepatomegaly, splenomegaly  Extremities:  no edema, redness, tenderness in the calves  Skin:  no gross lesions, rashes, induration  Neuro:  Alert, oriented X 3, no new focal weakness    Investigations:      Laboratory Testing:  Lab Results   Component Value Date    WBC 10.6 2022    HGB 11.6 (L) 2022    HCT 34.5 (L) 2022    MCV 90.2 2022     2022     Lab Results   Component Value Date     2022    K 4.2 2022     2022    CO2 26 2022    BUN 25 2022    CREATININE 1.17 2022    GLUCOSE 109 2022    CALCIUM 9.7 2022         Assessment :      Primary Problem  Debility    Active Hospital Problems    Diagnosis Date Noted    Non-intractable vomiting with nausea [R11.2] 2022     Priority: Medium    Status post lumbar spine surgery for decompression of spinal cord [Z98.890] 2022     Priority: Medium    Debility [R53.81] 2022     Priority: Medium    HTN (hypertension) [I10] 2012    Hypertriglyceridemia [E78.1] 2012       Plan:     Principal Problem:    Debility  Active Problems:    Non-intractable vomiting with nausea    Status post lumbar spine surgery for decompression of spinal cord    HTN (hypertension)    Hypertriglyceridemia  Resolved Problems:    * No resolved hospital problems. *        1. Status post lumbar spine surgery  2. Hypertension continue amlodipine, currently controlled  3. Hyperlipidemia-continue statin  4. GERD-continue Protonix    DVT prophylaxis-heparin      04/25/22   Nausea improved  Improving with PT/OT  No acute events overnight. Patient continues to feel physically well denies any fevers pain or new symptoms      Consultations:   Constantino Buchanan MD  4/25/2022  1:09 PM    Copy sent to Dr. Melchor Savage MD    Please note that this chart was generated using voice recognition Dragon dictation software. Although every effort was made to ensure the accuracy of this automated transcription, some errors in transcription may have occurred.

## 2022-04-25 NOTE — CARE COORDINATION
Patient Health Questionnaire-9 (PHQ-9)    Over the last 2 weeks, how often have you been bothered by any of the following problems? 1. Little Interest or pleasure in doing things? [x] Not at all  [] Several Days  [] More than half the day  []  Nearly every day    2. Feeling down, depressed or hopeless? [] Not at all  [x] Several Days  [] More than half the day  []  Nearly every day    3. Trouble falling or staying asleep, or sleeping too much? [] Not at all  [x] Several Days  [] More than half the day  []  Nearly every day    4. Feeling tired or having little energy? [x] Not at all  [] Several Days  [] More than half the day  []  Nearly every day    5. Poor apettite or overeating? [x] Not at all  [] Several Days  [] More than half the day  []  Nearly every day    6. Feeling bad about yourself-or that you are a failure or have let yourself or your family down? [x] Not at all  [] Several Days  [] More than half the day  []  Nearly every day    7. Trouble concentrating on things, such as reading the newspaper or watching television? [x] Not at all  [] Several Days  [] More than half the day  []  Nearly every day    8. Moving or speaking so slowly that other people could have noticed? Or the opposite-being so fidgety or restless that you have been moving around a lot more than usual?   [x] Not at all  [] Several Days  [] More than half the day  []  Nearly every day    9. Thoughts that you would be better off dead or of hurting yourself in some way? [x] Not at all  [] Several Days  [] More than half the day  []  Nearly every day    Total Score: 4    Denies suicidal thoughts or ideations. If you checked off any problems, how difficult have these problems made it for you to do your work, take care of things at home, or get along with other people?    [x] Not difficult at all  [] Somewhat Difficult  [] Very Difficult  []  Extremely Difficult

## 2022-04-25 NOTE — PLAN OF CARE
Individualized Plan of Care  [facility]    Rehabilitation physician: Dr Everton Connor Date: 4/23/2022     Rehabilitation Diagnosis: Debility [R53.81]     Rehabilitation impairments: self care, mobility, motor dysfunction and pain management    Factors facilitating achievement of predicted outcomes: Family support, Motivated, Cooperative, Pleasant, Good insight into deficits and Has homemaker services  Barriers to the achievement of predicted outcomes: Pain, Decreased endurance, Lower extremity weakness, Long standing deficits, Skin Care and Medication managment    Patient Goals: Improve independence with mobility, Increase overall strength and endurance, Increase independence with activities of daily living, Integrate appropriate pain management plan, Assure adequate nutritional option for discharge and Provide appropriate patient and family education      NURSING:  Nursing goals for [employer] while on the rehabilitation unit will include:  Adequate number of bowel movements, Urinate with no urinary retention >300ml in bladder, Maintain O2 SATs at an acceptable level during stay, Effective pain management while on the rehabilitation unit, Establish adequate pain control plan for discharge, Absence of skin breakdown while on the rehabilitation unit, Avoidance of any hospital acquired infections, Freedom from injury during hospitalization and Complete education with patient/family with understanding demonstrated regarding disease process and resultant impairment     In order to achieve these goals, nursing interventions may include bowel/bladder training, education for medical assistive devices, medication education, O2 saturation management, energy conservation, stress management techniques, fall prevention, alarms protocol, seating and positioning, skin/wound care, pressure relief instruction, dressing changes, infection protection, DVT prophylaxis, assistance with safe transfers , and/or assistance with bathroom activities and hygiene. PHYSICAL THERAPY:  Goals:        Short Term Goals  Time Frame for Short term goals: 1 week  Short term goal 1: Pt able to roll in bed maintaingn Back precautions independently  Short term goal 2: Pt able to perfrom supien <>sit SBA  Short term goal 3: Pt ableto transfers SBA wiht rolling walker  Short term goal 4:  Pt able to ambulate 150 ft with rolling walker distance of 150 ft, SBA  Short term goal 5: Pt able to go up and down 5 steps with 2 UE support, min A  Long Term Goals  Time Frame for Long term goals : By DC  Long term goal 1: Pt able to perform supine<>sit mod-I  Long term goal 2: Pt able to transfer mod-I from various surfaces with rolling walker  Long term goal 3: Pt able to ambulte with rolling walker on leel as well as outside terraine at mod- I distnace of atleast 200 ft. Long term goal 4: Pt to improve 2MW distance to 150ft to improve fucntion and gait speed  Long term goal 5: Pt able to go up and down 5 to 12 steps with 1 UE support, SBA    Plan of Care: Pt to be seen by physical therapy services 1 Hour 30 Minutes per day at least 5 out of 7 days per week     Anticipated interventions may include therapeutic exercises, gait training, neuromuscular re-ed, transfer training, community reintegration, bed mobility, w/c mobility and training.       OCCUPATIONAL THERAPY:  Goals:             Short Term Goals  Time Frame for Short term goals: By 1 week  Short Term Goal 1: Pt will complete lower body dressing/bathing with SBA and Good safety while maintaining back precautions  Short Term Goal 2: Pt will complete functional transfers/mobility during self care tasks with SBA and Good safety  Short Term Goal 3: Pt will complete upper body dressing with SBA and Good safety and compliance of back precautions  Short Term Goal 4: Pt will tolerate standing 5+ minutes during functional activity of choice with Good safety  Short Term Goal 5: Pt will verbalize/demonstrate good understanding AE/adaptive strategies/DME to assist in maintaining back precautions to increase safety and independence with self care and mobility  Short Term Goal 6: Pt will participate in 30+ minutes of theraputic exercises/functional activites to increase safety and independence with self care and mobility  Long Term Goals  Time Frame for Long term goals : By discharge  Long Term Goal 1: Pt will complete BADLs with modified independence and Good safety while maintaining back precautions  Long Term Goal 2: Pt will complete functional transfers/mobility during self care tasks with modified independence with Good safety  Long Term Goal 3: Pt will tolerate standing 10+ minutes during functional activity of choice with Good safety  Long Term Goal 4: Pt will complete simple meal prep/light house keeping task with Supervision and Good safety  Long Term Goal 5: Pt will verbalize/demonstrate Good understanding of home safety/fall prevention strategies to increase safety and independence with self care and mobility  Long Term Goal 6: Pt will demonstrate increased  strength during activities of daily living in R hand as evident by 5# improved  strength    Plan of Care: Patient to be seen by occupational therapy services 1 Hour 30 Minutes per day at least 5 out of 7 days per week     Anticipated interventions may include ADL and IADL retraining, strengthening, safety education and training, patient/caregiver education and training, equipment evaluation/ training/procurement, neuromuscular reeducation, wheelchair mobility training. SPEECH THERAPY:   Goals:                      Plan of Care:     CASE MANAGEMENT:  Goals:   Assist patient/family with discharge planning, patient/family counseling,  and coordination with insurance during the inpatient rehabilitation stay.          Other members of the multidisciplinary rehabilitation team that will be involved in the patient's plan of care include recreational therapy, dietary, respiratory therapy, and neuropsychology. Medical issues being managed closely and that require 24 hour availability of a physician:  Bowel/Bladder function, Wound care, Pain management, Infection protection, DVT prophylaxis, Fall precautions, Fluid/Electrolyte balance, Nutritional status, Respiratory needs, Anemia and History of heart disease                                           Physician anticipated functional outcomes: Improved independence with functional measures   Estimated length of stay for this admission 2 week  Medical Prognosis: Fair  Anticipated disposition: Home. The potential to achieve the above medical and rehabilitative goals is fair. This plan of care has been developed with the assistance and input of the multidisciplinary rehabilitation team.  The plan was reviewed with the patient on 4/25/2022. The patient has had the opportunity to provide input to the therapy team.    I have reviewed this Individualized Plan of Care and agree with its contents. Above documentation has been expanded, modified, adjusted to reflect the findings of my evaluations and goals for the patient.     Physician:  Electronically signed by Junior Avalos MD on 4/25/22 at 9:46 AM EDT

## 2022-04-25 NOTE — PROGRESS NOTES
Physical Therapy  Facility/Department: [facility] ACUTE REHAB  Rehabilitation Physical Therapy Daily Treatment Note    NAME: Tracey Forbes  : 1950 (66 y.o.)  MRN: [id number]  CODE STATUS: Full Code    Date of Service: 22      Restrictions:  Restrictions/Precautions: Surgical Protocols; Fall Risk;General Precautions  Required Braces or Orthoses  Spinal: Lumbar Corset  Right Lower Extremity Brace: Ankle Foot Orthotics (Does not used, asked if family can bring here to assess.)  Position Activity Restriction  Spinal Precautions: No Bending; No Lifting; No Twisting (for 2 weeks after surgery, avoid strenous activity)     SUBJECTIVE  Subjective: Pt reports sleeping \"better\" last night than the rest of the week. Pain: Pt reports 2/10 discomfort in abdomen with associated nausea. RN provided medication to assist with nausea. Pain Assessment  Pain Assessment: 0-10  Pain Level: 2  Pain Location: Back;Buttocks  Pain Orientation: Lower  Pain Descriptors: Aching  Response to Pain Intervention: Patient satisfied       Post Treatment Pain Screening  Pain Assessment  Pain Assessment: 0-10  Pain Level: 2  Pain Location: Back;Buttocks  Pain Orientation: Lower  Pain Descriptors: Aching  Response to Pain Intervention: Patient satisfied        OBJECTIVE            Cognition  Overall Cognitive Status: WFL         Functional Mobility  Bed mobility  Rolling to Left: Minimal assistance  Rolling to Right: Minimal assistance  Supine to Sit: Minimal assistance  Sit to Supine: Moderate assistance  Scooting: Minimal assistance  Comment: PT mat, wedge, 3 pillows. Transfers  Sit to Stand: Minimal Assistance  Stand to sit: Minimal Assistance  Comment: Transfers completed with RW. No LOB noted.    Balance  Posture: Good  Sitting - Static: Good  Sitting - Dynamic: Fair;+  Standing - Static: Fair;+  Standing - Dynamic: Fair  Comments: Standing with rolling walker    Environmental Mobility  Ambulation  Surface: level tile  Device: Rolling Walker  Assistance: Contact guard assistance;Minimal assistance  Quality of Gait: WBOS, R Decreased foot clearance, L Decreased foot clearance, Decreased claire, Forward trunk, R Foot flat, L Foot flat  Gait Deviations: Increased YASMANI; Decreased step length;Decreased step height;Slow Claire  Distance: 25'x2 (AM) and 75'x2 in (PM)  Comments: Easily fatigues in AM. Limited by nausea. Stairs/Curb  Stairs?: Yes    PT Exercises  Exercise Treatment: STS x4  A/AROM Exercises: Seated R LE ex's -15 res. Long sitting BLE ex's in recliner. Reps:  15 each. Resistive Exercises: Seated in w/c 1.5 lb on L LE, orange T band x 15 reps, B LE. Supine BLE ex's with 1.5#. Reps: 15 (Increase time required to complete all ther ex. )  Circulation/Endurance Exercises: Ankle pumps x 20 reps. Nustep L2 x10 min    ASSESSMENT  Vitals  BP Location: Left upper arm  O2 Device: None (Room air)    Activity Tolerance  Activity Tolerance: Patient limited by fatigue;Patient limited by endurance; Other (comment) (Nausea/vomit)    Assessment  Discharge Recommendations: Patient able to tolerate 3hrs of therapy a day;Home with assist PRN  PT D/C Equipment  Other: Pt has a borrowed rolling walker  PT Equipment Recommendations  Other: Pt has a borrowed rolling walker    GOALS  Patient Goals   Patient goals : Get stronger  Short Term Goals  Time Frame for Short term goals: 1 week  Short term goal 1: Pt able to roll in bed maintaingn Back precautions independently  Short term goal 2: Pt able to perfrom supien <>sit SBA  Short term goal 3: Pt ableto transfers SBA wiht rolling walker  Short term goal 4:  Pt able to ambulate 150 ft with rolling walker distance of 150 ft, SBA  Short term goal 5: Pt able to go up and down 5 steps with 2 UE support, min A  Long Term Goals  Time Frame for Long term goals : By DC  Long term goal 1: Pt able to perform supine<>sit mod-I  Long term goal 2: Pt able to transfer mod-I from various surfaces with rolling walker  Long term goal 3: Pt able to ambulte with rolling walker on leel as well as outside terraine at mod- I distnace of atleast 200 ft. Long term goal 4: Pt to improve 2MW distance to 150ft to improve fucntion and gait speed  Long term goal 5: Pt able to go up and down 5 to 12 steps with 1 UE support, SBA    PLAN OF CARE  Frequency: 1-2 treatment sessions per day, 5-7 days per week  Plan  Plan:  minutes of therapy at least 5 out of 7 days a week  Current Treatment Recommendations: Strengthening;Balance training;Functional mobility training;Transfer training; Endurance training;Gait training;Stair training; Safety education & training;Patient/Caregiver education & training;Equipment evaluation, education, & procurement  Safety Devices  Type of Devices: All fall risk precautions in place;Call light within reach;Gait belt;Patient at risk for falls; Left in chair;Left in bed;Nurse notified      Therapy Time         04/25/22 0809 04/25/22 1302   PT Individual Minutes   Time In 0809 1302   Time Out 0906 1335   Minutes [id number] [place], [place], 04/25/22 at 3:17 PM

## 2022-04-25 NOTE — PLAN OF CARE
Problem: Discharge Planning  Goal: Discharge to home or other facility with appropriate resources  Outcome: Progressing     Problem: Safety - Adult  Goal: Free from fall injury  4/25/2022 1416 by Coni Evans LPN  Outcome: Progressing  4/25/2022 0131 by Angus Shane RN  Outcome: Progressing  Note: No falls or injuries sustained at this time. No attempts to get out of bed without nursing assistance. Call light within reach and pt. uses appropriately for assistance. Siderails up x 2. Nonskid footwear remains on. Bed in low and locked position. Hourly nursing rounds made. Pt. Alert and oriented, aware of limitations, and exhibits good safety judgement. Pt. uses assistive devices appropriately. Pt. understands individual fall risk factors. Problem: ABCDS Injury Assessment  Goal: Absence of physical injury  4/25/2022 1416 by Coni Evans LPN  Outcome: Progressing  4/25/2022 0131 by Angus Shane RN  Outcome: Progressing  Note: No falls or injuries sustained at this time. No attempts to get out of bed without nursing assistance. Call light within reach and pt. uses appropriately for assistance. Problem: Pain  Goal: Verbalizes/displays adequate comfort level or baseline comfort level  4/25/2022 1416 by Coni Evans LPN  Outcome: Progressing  4/25/2022 0131 by Angus Shane RN  Outcome: Progressing  Note: Pain assessment completed. Pt. able to rest.Patient medicated with PRN tramadol and zanaflex for pain this shift as ordered. .   Pt. Repositions per self for comfort. Nonverbal cues indicate pain relief. Pt. Rests quietly with eyes closed after pain medication administration. Respirations easy and unlabored. Appears free from distress. Problem: Skin/Tissue Integrity  Goal: Absence of new skin breakdown  Description: 1. Monitor for areas of redness and/or skin breakdown  2. Assess vascular access sites hourly  3. Every 4-6 hours minimum:  Change oxygen saturation probe site  4. Every 4-6 hours:  If on nasal continuous positive airway pressure, respiratory therapy assess nares and determine need for appliance change or resting period. 4/25/2022 1416 by Tessie Ruvalcaba LPN  Outcome: Progressing  4/25/2022 0131 by Carl Estrella RN  Outcome: Progressing  Note: Skin assessment completed this shift. Nutrition and Hydration status assessed with adequate intake. Daniel Score as charted. Bilateral heels remain elevated on pillows throughout the shift. Patient able to reposition self for comfort and to prevent breakdown. Skin integrity maintained. No new skin breakdown noted. Skin to high risk pressure areas including coccyx and heels are clear.

## 2022-04-25 NOTE — PROGRESS NOTES
Occupational Therapy  Facility/Department: [facility] ACUTE REHAB  Rehabilitation Occupational Therapy Daily Treatment Note    Date: 22  Patient Name: Tim Aaron       Room: 7571/0892-82  MRN: [id number]  Account: [de-identified]   : 1950  (66 y.o.) Gender: female     Referring Practitioner: Nancy Ortiz MD  Diagnosis: Debility           Past Medical History:  has a past medical history of Arthritis, Chronic back pain, GERD (gastroesophageal reflux disease), Hyperlipidemia, Hypertension, MVP (mitral valve prolapse), and Polio. Past Surgical History:   has a past surgical history that includes Hysterectomy; Hemorrhoid surgery (); Leg Surgery; back surgery; Colonoscopy; Breast surgery; and Tonsillectomy. Restrictions  Restrictions/Precautions: Surgical Protocols; Fall Risk;General Precautions  Implants present? :  (Back surgery, R ankle surgery)  Other position/activity restrictions: No lifting greater than 1-2 lbs for 14 days  Required Braces or Orthoses  Spinal: Lumbar Corset  Right Lower Extremity Brace: Ankle Foot Orthotics (Does not used, asked if family can bring here to assess.)  Required Braces or Orthoses?: Yes    Subjective  Subjective: \"I'm just nauseous all the time\"  Pain Level: 4  Pain Location: Back  Pain Orientation: Right  Restrictions/Precautions: Surgical Protocols; Fall Risk;General Precautions        Pain Assessment  Pain Assessment: 0-10  Pain Level: 4  Pain Location: Back  Pain Orientation: Right  Pain Descriptors: Aching  Functional Pain Assessment: Prevents or interferes some active activities and ADLs  Pain Type: Surgical pain  Pain Frequency: Intermittent    Objective     Cognition  Overall Cognitive Status: WFL  Orientation  Overall Orientation Status: Within Functional Limits         ADL  Feeding  Assistance Level: Modified independent  Skilled Clinical Factors: increased time, per pt repotrt  Grooming/Oral Hygiene  Skilled Clinical Factors: Pt declined, requesting to lay down end of AM session  Upper Extremity Bathing  Equipment Provided: Long-handled sponge  Assistance Level: Stand by assist  Skilled Clinical Factors: seated in shower, using [address] to wash backside  Lower Extremity Bathing  Equipment Provided: Long-handled sponge  Assistance Level: Stand by assist  Skilled Clinical Factors: seated in shower, shifting weight to complete ramón care  Upper Extremity Dressing  Assistance Level: Stand by assist  Skilled Clinical Factors: Donning gown overhead, seated in shower  Lower Extremity Dressing  Equipment Provided: Reachers;Sock aid  Assistance Level: Minimal assistance  Skilled Clinical Factors: Educated/demonstrated use of sock aid and reacher to complete LB dressing, min cues to use reacher to keysha underwear. Pt expressed that her  can assist with donning/doffing socks/shoes as needed. Putting On/Taking Off Footwear  Assistance Level: Dependent  Skilled Clinical Factors: Writer donned slippers. Educated/demonstrated use of sock aid; however, pt informed writer that her  will assist if needed. She also stated that she   Toileting  Assistance Level: Stand by assist  Skilled Clinical Factors: Shifted weight to complete pericare  Toilet Transfers  Equipment: Raised toilet seat with arms  Assistance Level: Contact guard assist  Skilled Clinical Factors: Good hand placement this date PM: Pt requested to go to the bathroom at end of session. Pt requested more time. , provided call light prior to departure. Tub/Shower Transfers  Type: Shower  Transfer From: Rolling walker  Transfer To: Tub transfer bench  Additional Factors: Verbal cues  Assistance Level: Contact guard assist  Skilled Clinical Factors: Pt attempted to use GB vs RW, which GB was out of reach.  Educated/encouraged pt to utilize RW once inside shower      Functional Mobility  Device: Rolling walker  Activity: To/From bathroom  Assistance Level: Contact guard assist  Skilled Clinical Factors: Increased pain with movement, pt winced in pain. Writer encouraged deep breathing. Bed Mobility  Overall Assistance Level: Contact Guard Assist  Supine  Assistance Level: Contact guard assist  Transfers  Surface: To chair with arms  Additional Factors: Hand placement cues; Verbal cues  Device: Walker  Sit to Stand  Assistance Level: Contact guard assist  Skilled Clinical Factors: Cued for hand placement for safety  Stand to Sit  Assistance Level: Contact guard assist  Skilled Clinical Factors: Cued for hand placement for safety   OT Exercises  Exercise Treatment: PM: OT facilitated pt's engagement in today's session, 1/2# wrist weight while completing shoulder exercises. 1# weight in BUE exercises, while maintaining back precautions, in all planes. Good tolerance overall with frequent rest breaks. Assessment  Assessment  Activity Tolerance: Patient limited by fatigue;Patient limited by endurance; Other (comment) (Nausea/vomit)  Discharge Recommendations: Home with assist PRN;Patient would benefit from continued therapy after discharge  OT Equipment Recommendations  Other: TBD  Safety Devices  Safety Devices in place: Yes  Type of devices: All fall risk precautions in place;Call light within reach;Gait belt;Patient at risk for falls; Left in bed    Patient Education  Education  Education Given To: Patient  Education Provided: Plan of Care;Precautions (Back precautions)  Education Provided Comments: Educated pt on no BLTs, attempted to educate on log roll technique with no demonstration of understanding  Education Method: Verbal  Barriers to Learning: Other (Comment) (Pain)  Education Outcome: Unable to demonstrate understanding;Continued education needed    Plan  Plan  Times per Week: 5-7  Times per Day: Twice a day  Current Treatment Recommendations: Self-Care / ADL; Strengthening;ROM;Balance training;Functional mobility training; Endurance training;Pain management; Safety education & training;Patient/Caregiver education & training;Equipment evaluation, education, & procurement;Home management training    Goals  Patient Goals   Patient goals : Arminda Mays now just get back to normal and hope that my pain is gone from my back. \"  Short Term Goals  Time Frame for Short term goals: By 1 week  Short Term Goal 1: Pt will complete lower body dressing/bathing with SBA and Good safety while maintaining back precautions  Short Term Goal 2: Pt will complete functional transfers/mobility during self care tasks with SBA and Good safety  Short Term Goal 3: Pt will complete upper body dressing with SBA and Good safety and compliance of back precautions  Short Term Goal 4: Pt will tolerate standing 5+ minutes during functional activity of choice with Good safety  Short Term Goal 5: Pt will verbalize/demonstrate good understanding AE/adaptive strategies/DME to assist in maintaining back precautions to increase safety and independence with self care and mobility  Short Term Goal 6: Pt will participate in 30+ minutes of theraputic exercises/functional activites to increase safety and independence with self care and mobility  Long Term Goals  Time Frame for Long term goals : By discharge  Long Term Goal 1: Pt will complete BADLs with modified independence and Good safety while maintaining back precautions  Long Term Goal 2: Pt will complete functional transfers/mobility during self care tasks with modified independence with Good safety  Long Term Goal 3: Pt will tolerate standing 10+ minutes during functional activity of choice with Good safety  Long Term Goal 4: Pt will complete simple meal prep/light house keeping task with Supervision and Good safety  Long Term Goal 5: Pt will verbalize/demonstrate Good understanding of home safety/fall prevention strategies to increase safety and independence with self care and mobility  Long Term Goal 6: Pt will demonstrate increased  strength during activities of daily living in R hand as evident by 5# improved  strength    Therapy Time     04/25/22 0733 04/25/22 1625   OT Individual Minutes   Time In 0910 1338   Time Out 1001 1409   Minutes 51 31   Minute Variance   Variance 9  --    Reason   (Pt with other staff during scheduled time)  --    Time Code Minutes    Timed Code Treatment Minutes 51 Minutes 31 Minutes     Variance: 9  Reason:  (Pt with other staff during scheduled time)    Ella Dasilva, OT

## 2022-04-25 NOTE — FLOWSHEET NOTE
04/25/22 1617   Encounter Summary   Encounter Overview/Reason  Volunteer Encounter   Service Provided For: Patient   Referral/Consult From: Gene   Last Encounter  04/25/22   Complexity of Encounter Low   Spiritual/Emotional needs   Type Spiritual Support   Rituals, Rites and Sacraments   Type Yazdanism Communion   Assessment/Intervention/Outcome   Intervention Prayer (assurance of)/Junction

## 2022-04-25 NOTE — CARE COORDINATION
CASE MANAGEMENT NOTE:    Admission Date:  4/23/2022 Kiko Barajas is a 66 y.o.  female    Admitted for : Debility [R53.81] L2-5 laminectomy with L4-5 fusion on 4/18/22. Met with:  Patient    PCP: Sherrilee Duverney, MD                            Insurance: Medicare/ [place]    Is patient alert and oriented at time of discussion:  Yes    Current Residence/ Living Arrangements:  independently at home/            Does patient have 24 hour assistance at home:  Yes/  Current Services PTA:  No      Does patient go to outpatient dialysis: No  If yes, location and chair time: no    Is patient agreeable to VNS/Outpatient therapy if needed VNS    Freedom of choice provided:  Yes    List of Home Care Agencies/Outpatient therapy provided: Yes    VNS/Outpatient therapy chosen:  Yes, either [facility] or [facility] living if needed    DME:  Borrowed walker and donated wheelchair    Home Oxygen: No    Nebulizer: No    CPAP/BIPAP: No    Supplier: no    Handicap Placard: Yes    Potential Assistance Needed: undecided    Pharmacy: [facility] in [place]      Does Patient want to use MEDS to BEDS?  No    Is patient currently receiving oral anticoagulation therapy? no    Family Members/Caregivers that pt would like involved in their care:    Yes and No    If yes, list name here:      Transportation Provider:  Family             Discharge Plan: Home with    VNS if needed DME wheelchair and borrowed walker Placard: has one        Electronically signed by: Isidra Cormier RN on 4/25/2022 at 7:54 AM

## 2022-04-26 PROCEDURE — 97535 SELF CARE MNGMENT TRAINING: CPT

## 2022-04-26 PROCEDURE — 99231 SBSQ HOSP IP/OBS SF/LOW 25: CPT | Performed by: PHYSICAL MEDICINE & REHABILITATION

## 2022-04-26 PROCEDURE — 97530 THERAPEUTIC ACTIVITIES: CPT

## 2022-04-26 PROCEDURE — 97110 THERAPEUTIC EXERCISES: CPT

## 2022-04-26 PROCEDURE — 97116 GAIT TRAINING THERAPY: CPT

## 2022-04-26 PROCEDURE — 6370000000 HC RX 637 (ALT 250 FOR IP): Performed by: PHYSICAL MEDICINE & REHABILITATION

## 2022-04-26 PROCEDURE — 1180000000 HC REHAB R&B

## 2022-04-26 PROCEDURE — 6360000002 HC RX W HCPCS: Performed by: PHYSICAL MEDICINE & REHABILITATION

## 2022-04-26 PROCEDURE — 99231 SBSQ HOSP IP/OBS SF/LOW 25: CPT | Performed by: INTERNAL MEDICINE

## 2022-04-26 RX ADMIN — DORZOLAMIDE HYDROCHLORIDE AND TIMOLOL MALEATE 1 DROP: 20; 5 SOLUTION/ DROPS OPHTHALMIC at 09:19

## 2022-04-26 RX ADMIN — TRAMADOL HYDROCHLORIDE 50 MG: 50 TABLET, COATED ORAL at 06:18

## 2022-04-26 RX ADMIN — ACETAMINOPHEN 650 MG: 325 TABLET ORAL at 09:18

## 2022-04-26 RX ADMIN — ROSUVASTATIN CALCIUM 5 MG: 10 TABLET, FILM COATED ORAL at 20:38

## 2022-04-26 RX ADMIN — DORZOLAMIDE HYDROCHLORIDE AND TIMOLOL MALEATE 1 DROP: 20; 5 SOLUTION/ DROPS OPHTHALMIC at 20:37

## 2022-04-26 RX ADMIN — PANTOPRAZOLE SODIUM 40 MG: 40 TABLET, DELAYED RELEASE ORAL at 06:18

## 2022-04-26 RX ADMIN — POLYETHYLENE GLYCOL 3350 17 G: 17 POWDER, FOR SOLUTION ORAL at 09:19

## 2022-04-26 RX ADMIN — HEPARIN SODIUM 5000 UNITS: 5000 INJECTION INTRAVENOUS; SUBCUTANEOUS at 06:18

## 2022-04-26 RX ADMIN — TRAMADOL HYDROCHLORIDE 50 MG: 50 TABLET, COATED ORAL at 20:38

## 2022-04-26 RX ADMIN — HEPARIN SODIUM 5000 UNITS: 5000 INJECTION INTRAVENOUS; SUBCUTANEOUS at 15:57

## 2022-04-26 RX ADMIN — ONDANSETRON HYDROCHLORIDE 4 MG: 4 TABLET, FILM COATED ORAL at 09:18

## 2022-04-26 RX ADMIN — LATANOPROST 1 DROP: 50 SOLUTION OPHTHALMIC at 20:37

## 2022-04-26 RX ADMIN — HEPARIN SODIUM 5000 UNITS: 5000 INJECTION INTRAVENOUS; SUBCUTANEOUS at 20:38

## 2022-04-26 RX ADMIN — AMLODIPINE BESYLATE 10 MG: 10 TABLET ORAL at 09:18

## 2022-04-26 ASSESSMENT — PAIN DESCRIPTION - DESCRIPTORS
DESCRIPTORS: ACHING
DESCRIPTORS: ACHING

## 2022-04-26 ASSESSMENT — PAIN DESCRIPTION - ORIENTATION
ORIENTATION: RIGHT
ORIENTATION: RIGHT

## 2022-04-26 ASSESSMENT — PAIN SCALES - GENERAL
PAINLEVEL_OUTOF10: 6
PAINLEVEL_OUTOF10: 6
PAINLEVEL_OUTOF10: 5
PAINLEVEL_OUTOF10: 3

## 2022-04-26 ASSESSMENT — PAIN DESCRIPTION - LOCATION
LOCATION: BACK
LOCATION: BACK

## 2022-04-26 NOTE — PROGRESS NOTES
Physical Therapy  Facility/Department: [facility] ACUTE REHAB  Rehabilitation Physical Therapy Daily Treatment Note    NAME: Camille Colmenares  : 1950 (66 y.o.)  MRN: [id number]  CODE STATUS: Full Code    Date of Service: 22  Chart Reviewed: Yes  Patient assessed for rehabilitation services?: Yes  Referring Practitioner: Dr Roger Sadler  Referral Date : 22    Restrictions:  Restrictions/Precautions: Surgical Protocols; Fall Risk;General Precautions  Required Braces or Orthoses  Spinal: Lumbar Corset  Right Lower Extremity Brace: Ankle Foot Orthotics (Does not used, asked if family can bring here to assess.)  Position Activity Restriction  Spinal Precautions: No Bending; No Lifting; No Twisting (for 2 weeks after surgery, avoid strenous activity)  Other position/activity restrictions: No lifting greater than 1-2 lbs for 14 days     SUBJECTIVE  Subjective: Pt is in bed upon arrival. Pt reports pain is \"6/10\" this AM. PT reports she does not have socks to wear with her shoes and AFO however states she will have family bring some up. Will check with OT for compression stocking order. OBJECTIVE  Vision  Vision: Impaired  Vision Exceptions:  (Blind left eye, centeral vison in R eye only; peripheral vision in left eye)    Hearing  Hearing: Exceptions to [facility]  Hearing Exceptions: Left hearing aid;Bilateral hearing aid (Deaf in R ear, Always uses L aid, occasionally R aid)    Cognition  Overall Cognitive Status: WFL    ROM  AROM RLE (degrees)  RLE General AROM: Hip/Knee WFL, decreased R ankle DF from history of polio and previous foot/ankle surgery. R LE slight shorter than L LE. Functional Mobility  Bed mobility  Rolling to Left: Minimal assistance  Rolling to Right: Minimal assistance  Supine to Sit: Minimal assistance;Contact guard assistance  Sit to Supine: Contact guard assistance  Scooting: Minimal assistance  Comment: Hospital bed, bed rail utilized on left side. HOB elevated.    Transfers  Sit to Stand: Contact guard assistance  Stand to sit: Contact guard assistance  Bed to Chair: Contact guard assistance  Comment: Transfers completed with RW. No LOB noted. Transfers completed from bed, toilet, and w/c. Balance  Posture: Good  Sitting - Static: Good  Sitting - Dynamic: Fair;+  Standing - Static: Fair;+  Standing - Dynamic: Fair  Comments: Standing with rolling walker    Environmental Mobility  Ambulation  Surface: level tile  Device: Rolling Walker  Assistance: Contact guard assistance  Quality of Gait: WBOS, R Decreased foot clearance, L Decreased foot clearance, Decreased claire, Forward trunk, R Foot flat, L Foot flat. Writer encouraged/edu pt on the benefits of amb with AFO on.   Gait Deviations: Increased YASMANI; Decreased step length;Decreased step height;Slow Claire  Distance: 75'x2 and 30'x2 (AM)  Comments: Easily fatigues. Rest breaks PRN. Ambulation 2  Surface - 2: level tile  Device 2: Rolling Walker  Assistance 2: Contact guard assistance  Quality of Gait 2: Same as above. Distance: 100'x2 (PM)  Comments: Assisted pt back to bed after session. Stairs/Curb  Stairs?: Yes    PT Exercises  Exercise Treatment: STS x4  A/AROM Exercises: Seated R LE ex's -15 res. Resistive Exercises: Seated in w/c 1.5 lb on L LE, orange T band x 15 reps, B LE. (Increase time required to complete all ther ex. )  Circulation/Endurance Exercises: Ankle pumps x 20 reps. Nustep L2 x10 min  Dynamic Standing Balance Exercises: Standing BLE ex's with BUE support. Reps: 10 each. Disease-specific Exercises: Toilet transfer x1, bed mob x2. ASSESSMENT  Vitals  O2 Device: None (Room air)    Activity Tolerance  Activity Tolerance: Patient limited by fatigue;Patient limited by endurance; Other (comment) (Nausea)    Assessment  Assessment: Presents with B LE weakness, R LE > L LE, also history of polio with R side weakness and R foot drop. Pt requires min A for mobility at this time with rolling walker.  Needs skilled physical therapy to progress to PLOF. Performance Deficits/Impairments: Decreased functional mobility ; Decreased body mechanics; Decreased tolerance to work activity; Decreased strength;Decreased safe awareness;Decreased endurance;Decreased balance; Increased pain;Decreased posture  Activity Tolerance Comment: Swethaderian but emy to conitnue with small breaks in between. Treatment Diagnosis: Impaired mobility  Therapy Prognosis: Good  Decision Making: Medium Complexity  History: Hx of polio, R LE shorter than L LE  Discharge Recommendations: Patient able to tolerate 3hrs of therapy a day;Home with assist PRN  PT D/C Equipment  Other: Pt has a borrowed rolling walker  PT Equipment Recommendations  Other: Pt has a borrowed rolling walker    CLINICAL IMPRESSION   Presents with B LE weakness, R LE > L LE, also history of polio with R side weakness and R foot drop. Pt requires min A for mobility at this time with rolling walker. Needs skilled physical therapy to progress to PLOF. GOALS  Patient Goals   Patient goals : Get stronger  Short Term Goals  Time Frame for Short term goals: 1 week  Short term goal 1: Pt able to roll in bed maintaingn Back precautions independently  Short term goal 2: Pt able to perfrom supien <>sit SBA  Short term goal 3: Pt ableto transfers SBA wiht rolling walker  Short term goal 4:  Pt able to ambulate 150 ft with rolling walker distance of 150 ft, SBA  Short term goal 5: Pt able to go up and down 5 steps with 2 UE support, min A  Long Term Goals  Time Frame for Long term goals : By DC  Long term goal 1: Pt able to perform supine<>sit mod-I  Long term goal 2: Pt able to transfer mod-I from various surfaces with rolling walker  Long term goal 3: Pt able to ambulte with rolling walker on leel as well as outside terraine at mod- I distnace of atleast 200 ft.   Long term goal 4: Pt to improve 2MW distance to 150ft to improve fucntion and gait speed  Long term goal 5: Pt able to go up and down 5 to 12 steps with 1 UE support, SBA    PLAN OF CARE  Frequency: 1-2 treatment sessions per day, 5-7 days per week  Plan  Plan:  minutes of therapy at least 5 out of 7 days a week  Current Treatment Recommendations: Strengthening;Balance training;Functional mobility training;Transfer training; Endurance training;Gait training;Stair training; Safety education & training;Patient/Caregiver education & training;Equipment evaluation, education, & procurement  Safety Devices  Type of Devices: All fall risk precautions in place;Call light within reach;Gait belt;Patient at risk for falls; Left in chair;Left in bed;Nurse notified    ELOS:          Therapy Time     04/26/22 0806 04/26/22 1300   PT Individual Minutes   Time In 0806 1300   Time Out 0902 1334   Minutes [phone] [facility]. [facility], [place], 04/26/22 at 3:39 PM

## 2022-04-26 NOTE — PROGRESS NOTES
Physical Medicine & Rehabilitation  Progress Note      Subjective:      66year-old with lumbar stenosis who is s/p L2-5 laminectomy and L4-5 fusion. Patient is observed ambulating in room with rolling walker from bathroom. She reports aching pain is moderate at 6/10 at worst and responding to medications. No new issues with sleep, appetite, bladder. Had BM.     ROS:  Denies fevers, chills, sweats. No chest pain, palpitations, lightheadedness. Denies coughing, wheezing or shortness of breath. Denies abdominal pain, nausea, diarrhea. No new areas of joint pain. Denies new areas of numbness or weakness. Denies new anxiety or depression issues. No new skin problems. Rehabilitation:   Progressing in therapies. PT:  Restrictions/Precautions: Surgical Protocols,Fall Risk,General Precautions  Implants present? : Metal implants (Back surgery, R ankle surgery)  Other position/activity restrictions: No lifting greater than 1-2 lbs for 14 days  Required Braces or Orthoses  Spinal: Lumbar Corset  Right Lower Extremity Brace: Ankle Foot Orthotics (Does not used, asked if family can bring here to assess.)   Transfers  Sit to Stand: Minimal Assistance  Stand to sit: Minimal Assistance  Bed to Chair: Minimal assistance  Comment: Transfers completed with RW. No LOB noted. Ambulation  Surface: level tile  Device: Rolling Walker  Assistance: Contact guard assistance,Minimal assistance  Quality of Gait: WBOS, R Decreased foot clearance, L Decreased foot clearance, Decreased claire, Forward trunk, R Foot flat, L Foot flat  Gait Deviations: Increased YASMANI,Decreased step length,Decreased step height,Slow Claire  Distance: 25'x2 (AM) and 75'x2 in (PM)  Comments: Easily fatigues in AM. Limited by nausea. Transfers  Sit to Stand: Minimal Assistance  Stand to sit: Minimal Assistance  Bed to Chair: Minimal assistance  Comment: Transfers completed with RW. No LOB noted.       Ambulation  Surface: level tile  Device: Rolling Walker  Assistance: Contact guard assistance,Minimal assistance  Quality of Gait: WBOS, R Decreased foot clearance, L Decreased foot clearance, Decreased claire, Forward trunk, R Foot flat, L Foot flat  Gait Deviations: Increased YASMANI,Decreased step length,Decreased step height,Slow Claire  Distance: 25'x2 (AM) and 75'x2 in (PM)  Comments: Easily fatigues in AM. Limited by nausea. Surface: level tile  Ambulation  Surface: level tile  Device: Rolling Walker  Assistance: Contact guard assistance,Minimal assistance  Quality of Gait: WBOS, R Decreased foot clearance, L Decreased foot clearance, Decreased claire, Forward trunk, R Foot flat, L Foot flat  Gait Deviations: Increased YASMANI,Decreased step length,Decreased step height,Slow Claire  Distance: 25'x2 (AM) and 75'x2 in (PM)  Comments: Easily fatigues in AM. Limited by nausea. OT:  ADL  Feeding: Modified independent   Feeding Skilled Clinical Factors: per pt report with increased time  Grooming: Stand by assistance  Grooming Skilled Clinical Factors: sitting EOB  UE Bathing: Stand by assistance  UE Bathing Skilled Clinical Factors: Sitting on tub bench with verbal cues for back precautions  LE Bathing: Moderate assistance  LE Bathing Skilled Clinical Factors: A with bilateral lower legs/feet and bottom  UE Dressing: Moderate assistance  UE Dressing Skilled Clinical Factors: A with doffing/donning back brace  LE Dressing: Maximum assistance  LE Dressing Skilled Clinical Factors: A with doffing/donning bilateral socks and A with threading LLE with increased time and verbal cues for back precautions  Toileting: Minimal assistance  Toileting Skilled Clinical Factors: A for bottom hygiene  Additional Comments: OT facilitated pts engagement in full shower on this date with use of tub bench, grab bars, and hand held shower head. Lunbar corset doffed while sitting on tub bench. Pt required A with all lower body self care tasks at this time.  Pt currently limited due to decerased strength, balance, activity tolerance, nausea, and pain limiting safety and independence with self care tasls. PM: OT introduced patient to AE (reacher, sock aid, and long handle sponge) to be used during self care tasks during next session. Balance  Sitting Balance: Stand by assistance  Standing Balance: Minimal assistance (Min A - CGA)   Standing Balance  Time: 1-2 minutes x 4  Activity: Functional transfers/mobility, self care tasks  Comment: Verbal cues for hand placement and safety  Functional Mobility  Functional - Mobility Device: Rolling Walker  Activity: To/from bathroom  Assist Level: Minimal assistance (min A - CGA)     Bed mobility  Rolling to Left: Minimal assistance  Rolling to Right: Minimal assistance  Supine to Sit: Minimal assistance  Sit to Supine: Moderate assistance  Scooting: Minimal assistance  Comment: PT mat, wedge, 3 pillows. Transfers  Sit to stand: Minimal assistance (min A -CGA)  Stand to sit: Contact guard assistance  Transfer Comments: Verbal cues for hand placement and safety   Toilet Transfers  Toilet - Technique: Ambulating  Equipment Used: Raised toilet seat with rails  Toilet Transfer: Minimal assistance  Toilet Transfers Comments: Verbal cues for hand placement and safety     Shower Transfers  Shower - Transfer From: Walker  Shower - Transfer Type: To and From  Shower - Transfer To: Transfer tub bench  Shower - Technique: Ambulating  Shower Transfers: Minimal assistance  Shower Transfers Comments: Verbal cues for safety over threshold       SPEECH:      Objective:  /75   Pulse 79   Temp 98.2 °F (36.8 °C)   Resp 19   Ht 5' 4\" (1.626 m)   Wt 182 lb (82.6 kg)   SpO2 97%   BMI 31.24 kg/m²       GEN: well developed, well nourished, NAD  HEENT: NCAT, PERRL, EOMI, mucous membranes pink and moist  CV: RRR, no murmurs, rubs or gallops  PULM: CTAB, no rales or rhonchi. Respirations WNL and unlabored  ABD: soft, NT, ND, BS+ and equal  NEURO: A&O x3. Sensation intact to light touch. MSK: Functional ROM BUEs, some weakness impairing AROM BLEs . Strength 5/5 key muscles BUEs, 4+/5 key muscles BLEs. EXTREMITIES: No calf tenderness to palpation bilaterally. No edema BLEs  SKIN: warm dry and intact with good turgor. Midline low back incision with dressing CD&I - no palpable induration, no drainage to dressing. Deborah Money PSYCH: appropriately interactive. Affect WNL. Diagnostics:     CBC:   Recent Labs     04/24/22  0609   WBC 10.6   RBC 3.82*   HGB 11.6*   HCT 34.5*   MCV 90.2   RDW 14.2        BMP:   Recent Labs     04/24/22  0609      K 4.2      CO2 26   BUN 25*   CREATININE 1.17*   GLUCOSE 109*     BNP: No results for input(s): BNP in the last 72 hours. PT/INR: No results for input(s): PROTIME, INR in the last 72 hours. APTT: No results for input(s): APTT in the last 72 hours. CARDIAC ENZYMES: No results for input(s): CKMB, CKMBINDEX, TROPONINT in the last 72 hours. Invalid input(s): CKTOTAL;3 troponins   FASTING LIPID PANEL:  Lab Results   Component Value Date    CHOL 223 (H) 07/19/2016    HDL 38 (L) 07/17/2017    TRIG 314 (H) 07/19/2016     LIVER PROFILE: No results for input(s): AST, ALT, ALB, BILIDIR, BILITOT, ALKPHOS in the last 72 hours.      Current Medications:   Current Facility-Administered Medications: albuterol sulfate  (90 Base) MCG/ACT inhaler 2 puff, 2 puff, Inhalation, Q6H PRN  latanoprost (XALATAN) 0.005 % ophthalmic solution 1 drop, 1 drop, Both Eyes, Nightly  ondansetron (ZOFRAN) tablet 4 mg, 4 mg, Oral, Q8H PRN  acetaminophen (TYLENOL) tablet 650 mg, 650 mg, Oral, Q4H PRN  polyethylene glycol (GLYCOLAX) packet 17 g, 17 g, Oral, Daily  senna (SENOKOT) tablet 17.2 mg, 2 tablet, Oral, Daily PRN  bisacodyl (DULCOLAX) suppository 10 mg, 10 mg, Rectal, Daily PRN  amLODIPine (NORVASC) tablet 10 mg, 10 mg, Oral, Daily  dorzolamide-timolol (COSOPT) 22.3-6.8 MG/ML ophthalmic solution 1 drop, 1 drop, Both Eyes, BID  montelukast (SINGULAIR) tablet 10 mg, 10 mg, Oral, Daily  mupirocin (BACTROBAN) 2 % ointment, , Topical, BID  rosuvastatin (CRESTOR) tablet 5 mg, 5 mg, Oral, Nightly  tiZANidine (ZANAFLEX) tablet 4 mg, 4 mg, Oral, Q6H PRN  traMADol (ULTRAM) tablet 50 mg, 50 mg, Oral, Q6H PRN  vitamin D (ERGOCALCIFEROL) capsule 50,000 Units, 50,000 Units, Oral, Weekly  heparin (porcine) injection 5,000 Units, 5,000 Units, SubCUTAneous, 3 times per day  lidocaine 4 % external patch 2 patch, 2 patch, TransDERmal, Daily  pantoprazole (PROTONIX) tablet 40 mg, 40 mg, Oral, QAM AC      Impression/Plan:   Impaired ADLs, gait, and mobility due to:      1. Debility after lumbar laminectomy and fusion for stenosis: Performed by Dr. Garett Hernandez 4/18/22. PT/OT  for gait, mobility, strengthening, endurance, ADLs, and self care. Has Ultram, Tylenol, Zanaflex prn, Lidocaine patch. 2. Acute Respiratory Failure: Post-op. Improved. Has albuterol and Singulair - refusing per respiratory therapy  3. Hx polio with R foot drop: history of R heel and leg lengthening  4. HTN/HLD: on amlodipine, crestor  5. Nausea/vomiting: started on pantoprazole per IM. 6. Vitamin D deficiency: on weekly repletion  7. Bowel Management: Miralax daily, Senokot prn, Dulcolax prn - recommend suppository today  8. Internal medicine for medical management  9. DVT prophylaxis:  On heparin, SCDs and NATHAN stockings      Electronically signed by Flori Antoine MD on 4/26/2022 at 8:36 AM      This note is created with the assistance of a speech recognition program.  While intending to generate a document that actually reflects the content of the visit, the document can still have some errors including those of syntax and sound a like substitutions which may escape proof reading. In such instances, actual meaning can be extrapolated by contextual diversion.

## 2022-04-26 NOTE — FLOWSHEET NOTE
04/26/22 1130   Encounter Summary   Encounter Overview/Reason  Rituals, Rites and Sacraments   Service Provided For: Patient   Referral/Consult From: Rounding   Last Encounter  04/26/22  (Anointing)   Complexity of Encounter Low   Spiritual/Emotional needs   Type Spiritual Support   Rituals, Rites and Sacraments   Type Anointing  (Emilie Prather 4/26/22)

## 2022-04-26 NOTE — PATIENT CARE CONFERENCE
[facility] Acute Inpatient Rehabilitation  TEAM CONFERENCE NOTE  Date: 22  Patient Name: Lizzette Pimentel       Room: 4101/6658-96  MRN: [id number]       : 1950  (66 y.o.)     Gender: female   Referring Practitioner: Dr Cullen Ventura [R53.81]  Diagnosis: Debility     NURSING  Bladder  Always Continent  Bowel   Always Continent  Date of Last BM:    Intervention    Both Bowel & Bladder Program     Wounds/Incisions/Ulcers: Incision healing well  Medication Education Program: Patient able to manage medications and being educated by nursing  Pain: Patient's pain is currently controlled with -  Tramadol 50 mg Q6H PRN    Fall Risk:  Falling star program initiated    PHYSICAL THERAPY  Bed mobility  Rolling to Left: Minimal assistance  Rolling to Right: Minimal assistance  Supine to Sit: Minimal assistance  Sit to Supine: Moderate assistance  Scooting: Minimal assistance  Comment: PT mat, wedge, 3 pillows. Transfers:  Sit to Stand: Minimal Assistance  Stand to sit: Minimal Assistance  Bed to Chair: Minimal assistance    Ambulation  Surface: level tile  Device: Rolling Walker  Assistance: Contact guard assistance;Minimal assistance  Quality of Gait: WBOS, R Decreased foot clearance, L Decreased foot clearance, Decreased tracee, Forward trunk, R Foot flat, L Foot flat  Gait Deviations: Increased YASMANI; Decreased step length;Decreased step height;Slow Tracee  Distance: 25'x2 (AM) and 75'x2 in (PM)  Comments: Easily fatigues in AM. Limited by nausea. Ambulation 2  Surface - 2: level tile  Device 2: Rolling Walker  Assistance 2: Minimal assistance  Quality of Gait 2: Improved tracee compared to a:m session, pt still sleepy, fallin g asleep while resting in chiar. Distance: 95 ft  Comments: Assisted pt back to bed after session. # Steps : 5  Stairs Height: 4\" (and 6\" )  Rails: Bilateral  Assistance: Minimal assistance  Comment: Pt is edu on proper sequencing with fair + carryover.  Pt is able to complete a step to gait pattern. Fair foot clearance this date.        Goals  Time Frame for Short term goals: 1 week  Short term goal 1: Pt able to roll in bed maintaingn Back precautions independently  Short term goal 2: Pt able to perfrom supien <>sit SBA  Short term goal 3: Pt ableto transfers SBA wiht rolling walker  Short term goal 4:  Pt able to ambulate 150 ft with rolling walker distance of 150 ft, SBA  Short term goal 5: Pt able to go up and down 5 steps with 2 UE support, min A      OCCUPATIONAL THERAPY  SELF CARE      Eating   6     with increased time per pt report  Modified independent    Oral Hygiene    Stand by assistance   Shower/Bathe Self   4         UE Bathing: Stand by assistance  LE Bathing: Stand by assist (seated in shower, shifting weight to complete ramón care)   Upper Body Dressing   4        Stand by assist  Lower Body Dressing   3        Putting On/Taking Off Footwear            Minimal assistance - educated/demonstrated use of sock aid and reacher  Toilet Transfer   4         Toilet - Technique: Ambulating  Equipment Used: Raised toilet seat with rails  Toilet Transfer: Contact guard assist  Toilet Transfers Comments: Verbal cues for hand placement and safety   Toileting Hygiene   4        Stand by assist (shifted weight to complete ramón care)    Bed mobility  Supine to Sit: Minimal assistance  Sit to Supine: Minimal assistance  Comment: Verbal cues for log rolling      Shower Transfers: Minimal assistance  Balance  Sitting Balance: Stand by assistance  Standing Balance: Minimal assistance (Min A - CGA)  Standing Balance  Time: 1-2 minutes x 4  Activity: Functional transfers/mobility, self care tasks  Comment: Verbal cues for hand placement and safety            Short Term Goals  Time Frame for Short term goals: By 1 week  Short Term Goal 1: Pt will complete lower body dressing/bathing with SBA and Good safety while maintaining back precautions  Short Term Goal 2: Pt will complete functional transfers/mobility during self care tasks with SBA and Good safety  Short Term Goal 3: Pt will complete upper body dressing with SBA and Good safety and compliance of back precautions  Short Term Goal 4: Pt will tolerate standing 5+ minutes during functional activity of choice with Good safety  Short Term Goal 5: Pt will verbalize/demonstrate good understanding AE/adaptive strategies/DME to assist in maintaining back precautions to increase safety and independence with self care and mobility  Short Term Goal 6: Pt will participate in 30+ minutes of theraputic exercises/functional activites to increase safety and independence with self care and mobility      SPEECH THERAPY    Orientation Log (O-Log) Score:   Cognitive Log (Cog-Log) Score:   Short Term Goal:     NUTRITION  Weight: 182 lb (82.6 kg) / Body mass index is 31.24 kg/m². Diet Rx: Regular. PO intake appears fair with average of 50-75% intake at meals. Nausea and vomiting contributes to further decrease at times. Pt is on Protonix daily and Zofran prn. Continue to monitor. Please see nutrition note for details.     SOCIAL WORK ASSESSMENT  Assessment: Home/ independent   Pre-Admission Status:  Lives With: Spouse  Type of Home: House  Home Layout: One level  Home Access: Stairs to enter without rails (Holds onto brick ledge and then the door frame)  Entrance Stairs - Number of Steps: 1+1+ledge at Standard [place] Stairs - Rails:  (Has post to hold onto on the right side)  Bathroom Shower/Tub: Walk-in shower,Doors,Tub only (Drop in tub)  Bathroom Toilet: Handicap height  Bathroom Equipment: 3-in-1 commode (Has one suction bar which she can use if needed)  Bathroom Accessibility: Walker accessible  Home Equipment: Jacklyn Usha, rolling,Wheelchair-manual (RW is borrowed)  Has the patient had two or more falls in the past year or any fall with injury in the past year?: Yes  Receives Help From: Family  ADL Assistance: [address]: Needs assistance ( assists)  Homemaking Responsibilities: Yes  Ambulation Assistance: Independent  Transfer Assistance: Independent  Active : Yes  Mode of Transportation: SUV  Occupation: Retired  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 Sleeps in a regular bed at home. 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 Pt reports that her 2 sons and wives are able to assist as needed. Family Education: Need to make contact with family to initiate education    Percentage Risk for Readmission: Low 0 - 18%   Readmission Risk              Risk of Unplanned Readmission:  10       %    Critical Items: None       Problem / Barrier Intervention / Plan  Results   Impaired function related to recent back surgery and chronic weakness  RLE 2* hx of polio. Strengthening, functional mobility training with assistive device. Altered ability to care for self Training and remediation in modified care strategies                               Total Self Care Score    Total Mobility Score  Admission Score:  22      Admission Score:  31  Goal:  42/42         Goal:  76/90   `  Discharge Plan   Estimated Discharge Date: 5/4/2022  Home evaluation needed?  Home Evaluation Indication (NO, Requires ReEval, YES/Date): No home evaluation need indicated for patient at this time  Overnight or Day Pass: No  Factors facilitating achievement of predicted outcomes: Family support, Motivated, Cooperative, Pleasant and Good insight into deficits  Barriers to the achievement of predicted outcomes: Pain, Decreased endurance, Lower extremity weakness, Long standing deficits, Medical complications and Medication managment    Functional Goals at discharge:  Predicted Outcome: Home with familyPATIENT'S LEVEL OF ASSISTANCE: Modified independence  Discharge therapy goals:  PT: Long Term Goals  Time Frame for Long term goals : By DC  Long term goal 1: Pt able to perform supine<>sit mod-I  Long term goal 2: Pt able to transfer mod-I from various surfaces with rolling walker  Long term goal 3: Pt able to ambulte with rolling walker on leel as well as outside terraine at mod- I distnace of atleast 200 ft. Long term goal 4: Pt to improve 2MW distance to 150ft to improve fucntion and gait speed  Long term goal 5: Pt able to go up and down 5 to 12 steps with 1 UE support, SBA  OT:Long Term Goals  Time Frame for Long term goals : By discharge  Long Term Goal 1: Pt will complete BADLs with modified independence and Good safety while maintaining back precautions  Long Term Goal 2: Pt will complete functional transfers/mobility during self care tasks with modified independence with Good safety  Long Term Goal 3: Pt will tolerate standing 10+ minutes during functional activity of choice with Good safety  Long Term Goal 4: Pt will complete simple meal prep/light house keeping task with Supervision and Good safety  Long Term Goal 5: Pt will verbalize/demonstrate Good understanding of home safety/fall prevention strategies to increase safety and independence with self care and mobility  Long Term Goal 6: Pt will demonstrate increased  strength during activities of daily living in R hand as evident by 5# improved  strength  ST:     Participating Team Members:  /:  Billy Ríos RN  Occupational Therapist: Tabby Fowler OT    Physical Therapist: Jason Dennis PT  Speech Therapist:  N/A  Nurse: Ara Vallejo RN    Dietary/Nutrition: Enio Escoto RD, LD  Pastoral Care: Myla Runner  CMG: Kinza Ngo RN    I approve the established interdisciplinary plan of care as documented within the medical record of [facility].     Sharan Gallegos MD

## 2022-04-26 NOTE — PROGRESS NOTES
Occupational Therapy  Facility/Department: [facility] ACUTE REHAB  Rehabilitation Occupational Therapy Daily Treatment Note    Date: 22  Patient Name: Cass Virk       Room: 0209/8830-11  MRN: [id number]  Account: [de-identified]   : 1950  (72 y.o.) Gender: female                    Past Medical History:  has a past medical history of Arthritis, Chronic back pain, GERD (gastroesophageal reflux disease), Hyperlipidemia, Hypertension, MVP (mitral valve prolapse), and Polio. Past Surgical History:   has a past surgical history that includes Hysterectomy; Hemorrhoid surgery (); Leg Surgery; back surgery; Colonoscopy; Breast surgery; and Tonsillectomy. Restrictions  Restrictions/Precautions: Surgical Protocols; Fall Risk;General Precautions  Implants present? : Metal implants  Other position/activity restrictions: No lifting greater than 1-2 lbs for 14 days  Required Braces or Orthoses?: Yes    Subjective  Pain Level: 6  Pain Location: Back  Pain Orientation: Right  Restrictions/Precautions: Surgical Protocols; Fall Risk;General Precautions        Pain Assessment  Pain Assessment: 0-10  Pain Level: 6  Pain Location: Back  Pain Orientation: Right  Pain Descriptors: Aching    Objective     Cognition  Overall Cognitive Status: WFL  Orientation  Overall Orientation Status: Within Functional Limits         ADL  Feeding  Assistance Level: Modified independent  Skilled Clinical Factors: increased time, per pt repotrt  Upper Extremity Bathing  Equipment Provided: Long-handled sponge  Assistance Level: Stand by assist  Lower Extremity Bathing  Equipment Provided: Long-handled sponge  Assistance Level: Stand by assist  Skilled Clinical Factors: seated in shower, shifting weight to complete ramón care  Upper Extremity Dressing  Assistance Level: Stand by assist  Skilled Clinical Factors: seated to don [place] shirt   Lower Extremity Dressing  Equipment Provided: Reachers  Assistance Level: Minimal assistance  Skilled Clinical Factors: utilizing reacher to thread pants  Putting On/Taking Off Footwear  Assistance Level: Dependent  Skilled Clinical Factors: assist for don/doff slippers   Toileting  Assistance Level: Stand by assist  Skilled Clinical Factors: Shifted weight to complete pericare  Toilet Transfers  Equipment: Raised toilet seat with arms  Assistance Level: Contact guard assist  Skilled Clinical Factors: good hand palcement and safety noted      Functional Mobility  Device: Rolling walker  Activity: To/From bathroom  Assistance Level: Contact guard assist  Skilled Clinical Factors: pt tolerated well this date   Bed Mobility  Overall Assistance Level: Stand By Assist  Additional Factors: Head of bed raised  Supine to Sit  Assistance Level: Stand by assist  Transfers  Surface: To chair with arms  Additional Factors: Hand placement cues; Verbal cues  Device: Walker  Sit to Stand  Assistance Level: Contact guard assist  Skilled Clinical Factors: Cued for hand placement for safety  Stand to Sit  Assistance Level: Contact guard assist  Skilled Clinical Factors: Cued for hand placement for safety   OT Exercises  Exercise Treatment: OT facilitated pts activity tollerance performing balloon tapping activity seated. Completed 2 sets ranging from 4 - 5 min. Requiring ocassional rest breaks      Assessment  Assessment  Activity Tolerance: Patient tolerated treatment well  Discharge Recommendations: Home with assist PRN;Patient would benefit from continued therapy after discharge  OT Equipment Recommendations  Other: TBD  Safety Devices  Safety Devices in place: Yes  Type of devices: All fall risk precautions in place;Call light within reach;Gait belt;Patient at risk for falls; Left in bed    Patient Education  Education  Education Given To: Patient  Education Provided: Plan of Care;Precautions  Education Provided Comments: Educated pt on no BLTs, attempted to educate on log roll technique with no demonstration of understanding  Education Method: Verbal    Plan  Plan  Times per Week: 5-7  Times per Day: Twice a day  Current Treatment Recommendations: Self-Care / ADL; Strengthening;ROM;Balance training;Functional mobility training; Endurance training;Pain management; Safety education & training;Patient/Caregiver education & training;Equipment evaluation, education, & procurement;Home management training    Goals  Patient Goals   Patient goals : Jesús Henriquez now just get back to normal and hope that my pain is gone from my back. \"  Short Term Goals  Time Frame for Short term goals: By 1 week  Short Term Goal 1: Pt will complete lower body dressing/bathing with SBA and Good safety while maintaining back precautions  Short Term Goal 2: Pt will complete functional transfers/mobility during self care tasks with SBA and Good safety  Short Term Goal 3: Pt will complete upper body dressing with SBA and Good safety and compliance of back precautions  Short Term Goal 4: Pt will tolerate standing 5+ minutes during functional activity of choice with Good safety  Short Term Goal 5: Pt will verbalize/demonstrate good understanding AE/adaptive strategies/DME to assist in maintaining back precautions to increase safety and independence with self care and mobility  Short Term Goal 6: Pt will participate in 30+ minutes of theraputic exercises/functional activites to increase safety and independence with self care and mobility  Long Term Goals  Time Frame for Long term goals : By discharge  Long Term Goal 1: Pt will complete BADLs with modified independence and Good safety while maintaining back precautions  Long Term Goal 2: Pt will complete functional transfers/mobility during self care tasks with modified independence with Good safety  Long Term Goal 3: Pt will tolerate standing 10+ minutes during functional activity of choice with Good safety  Long Term Goal 4: Pt will complete simple meal prep/light house keeping task with Supervision and Good safety  Long Term Goal 5: Pt will verbalize/demonstrate Good understanding of home safety/fall prevention strategies to increase safety and independence with self care and mobility  Long Term Goal 6: Pt will demonstrate increased  strength during activities of daily living in R hand as evident by 5# improved  strength     04/26/22 0919 04/26/22 0920   OT Individual Minutes   Time In 0902 1437   Time Out 1002 1510   Minutes 60 [facility]

## 2022-04-26 NOTE — PROGRESS NOTES
[facility] Internal Medicine    CONSULTATION / HISTORY AND PHYSICAL EXAMINATION            Date:   4/26/2022  Patient name:  Pacheco Lucas  Date of admission:  4/23/2022 12:09 PM  MRN:   [id number]  Account:  [de-identified]  YOB: 1950  PCP:    Swati Gonzalez MD  Room:   [facility]2606-  Code Status:    Full Code    Physician Requesting Consult: Cecile Beebe MD    Reason for Consult: Medical management    Chief Complaint:     No chief complaint on file.     Debility    History Obtained From:     patient, electronic medical record    History of Present Illness/ Interval History:     s/p lumbar spine decompression for lumbar spine stenosis  67-year-old female with history of previous back surgeries had progressively worsening low back pain for several years.  Notes worsening with walking.  Weakness to lower extremities with increased activity.  She has tried aqua therapy.  Also history of polio with residual right foot drop.  She uses a walker to assist with ambulation.  She was noted to have spinal stenosis L4-L5 level.  Underwent L2-5 laminectomy and interbody fusion L4-5.  Expandable disc spacer interbody L4-5.  Postop developed hypoxia Placed on 3 L oxygen.  On heparin for DVT prophylaxis    Medical history includes hypertension hyperlipidemia GERD    HTN  Onset more than 2 years ago  Charity: mild to mod  Usually controlled with current po meds  Not associated with headaches or blurry vision  No chest pain      Past Medical History:     Past Medical History:   Diagnosis Date    Arthritis     Chronic back pain     ddd    GERD (gastroesophageal reflux disease)     Hyperlipidemia     Hypertension     MVP (mitral valve prolapse)     Polio     right leg at age 2        Past Surgical History:     Past Surgical History:   Procedure Laterality Date    BACK SURGERY      BREAST SURGERY      COLONOSCOPY      HEMORRHOID SURGERY  1988    HYSTERECTOMY      LEG SURGERY fused right heel and right leg lengthened-due to polio    TONSILLECTOMY          Medications Prior to Admission:     Prior to Admission medications    Medication Sig Start Date End Date Taking? Authorizing Provider   alendronate (FOSAMAX) 70 MG tablet Take 1 tablet by mouth every 7 days 7/19/16   America Villarreal MD   hydrocortisone (ANUSOL-HC) 2.5 % rectal cream Place rectally 2 times daily. 5/31/16   Elsi Terrell MD   simvastatin (ZOCOR) 20 MG tablet Take 1 tablet by mouth nightly 4/21/16   America Villarreal MD   losartan-hydrochlorothiazide Lily Door) 100-25 MG per tablet Take 1 tablet by mouth daily 4/21/16   America Villarreal MD   clobetasol (TEMOVATE) 0.05 % cream Pt uses twice weekly 9/24/15   America Villarreal MD   Fluocinolone Acetonide 0.01 % OIL Place 1 drop in ear(s) nightly 7/7/15   Elsi Terrell MD   ibuprofen (ADVIL;MOTRIN) 800 MG tablet Take 800 mg by mouth every 8 hours as needed for Pain. 5/28/14 7/19/16  Elsi Terrell MD   timolol (TIMOPTIC-XR) 0.5 % ophthalmic gel-forming Place 1 drop into both eyes daily. 9/9/12   Historical Provider, MD   Multiple Vitamins-Minerals (PRESERVISION/LUTEIN) CAPS Take 1 capsule by mouth daily. Historical Provider, MD   Omeprazole-Sodium Bicarbonate (ZEGERID OTC)  MG CAPS Take  by mouth daily. Historical Provider, MD        Allergies:     Crestor [rosuvastatin], Darvocet-n 100 [propoxyphene n-acetaminophen], Equagesic, Lipitor, and Relafen [nabumetone]    Social History:     Tobacco:    reports that she has never smoked. She has never used smokeless tobacco.  Alcohol:      reports no history of alcohol use. Drug Use:  reports no history of drug use. Family History:     Family History   Problem Relation Age of Onset    Cancer Mother         breast    Heart Disease Father        Review of Systems:     Positive and Negative as described in HPI.     Denies any shortness of breath or cough  Denies chest pain or palpitations  Denies abdominal pain, diarrhea vomiting  Denies any new numbness tremors or weakness. Physical Exam:     /75   Pulse 79   Temp 98.2 °F (36.8 °C)   Resp 19   Ht 5' 4\" (1.626 m)   Wt 182 lb (82.6 kg)   SpO2 97%   BMI 31.24 kg/m²   Temp (24hrs), Av.2 °F (36.8 °C), Min:98.1 °F (36.7 °C), Max:98.2 °F (36.8 °C)      General appearance:  alert, cooperative and no distress  Eyes: Anicteric sclera. Pupils are equally round and reactive to light. Extraocular movements are intact.   Lungs:  clear to auscultation bilaterally, normal effort  Heart:  regular rate and rhythm, no murmur  Abdomen:  soft, nontender, nondistended, normal bowel sounds, no masses, hepatomegaly, splenomegaly  Extremities:  no edema, redness, tenderness in the calves  Skin:  no gross lesions, rashes, induration  Neuro:  Alert, oriented X 3, no new focal weakness    Investigations:      Laboratory Testing:  Lab Results   Component Value Date    WBC 10.6 2022    HGB 11.6 (L) 2022    HCT 34.5 (L) 2022    MCV 90.2 2022     2022     Lab Results   Component Value Date     2022    K 4.2 2022     2022    CO2 26 2022    BUN 25 2022    CREATININE 1.17 2022    GLUCOSE 109 2022    CALCIUM 9.7 2022         Assessment :      Primary Problem  Debility    Active Hospital Problems    Diagnosis Date Noted    Non-intractable vomiting with nausea [R11.2] 2022     Priority: Medium    Status post lumbar spine surgery for decompression of spinal cord [Z98.890] 2022     Priority: Medium    Debility [R53.81] 2022     Priority: Medium    HTN (hypertension) [I10] 2012    Hypertriglyceridemia [E78.1] 2012       Plan:     Principal Problem:    Debility  Active Problems:    Non-intractable vomiting with nausea    Status post lumbar spine surgery for decompression of spinal cord    HTN (hypertension)    Hypertriglyceridemia  Resolved Problems:    * No resolved hospital problems. *        1. Status post lumbar spine surgery  2. Hypertension continue amlodipine, currently controlled  3. Hyperlipidemia-continue statin  4. GERD-continue Protonix    DVT prophylaxis-heparin        4/26  Patient reports no new complaints. Engaging with physical therapy. Labs and vitals reviewed. No new issues. Continue with current care. Consultations:   [facility] CONSULT TO INTERNAL MEDICINE      Marcello Riley MD  4/26/2022  2:02 PM    Copy sent to Dr. Halima Recio MD    Please note that this chart was generated using voice recognition Dragon dictation software. Although every effort was made to ensure the accuracy of this automated transcription, some errors in transcription may have occurred.

## 2022-04-26 NOTE — PLAN OF CARE
Problem: Discharge Planning  Goal: Discharge to home or other facility with appropriate resources  Outcome: Progressing     Problem: Safety - Adult  Goal: Free from fall injury  4/26/2022 1607 by Shannen Grimes RN  Outcome: Progressing     Problem: ABCDS Injury Assessment  Goal: Absence of physical injury  4/26/2022 1607 by Shannen Grimes RN  Outcome: Progressing     Problem: Pain  Goal: Verbalizes/displays adequate comfort level or baseline comfort level  4/26/2022 1607 by Shannen Grimes RN  Outcome: Progressing     Problem: Skin/Tissue Integrity  Goal: Absence of new skin breakdown  Description: 1. Monitor for areas of redness and/or skin breakdown  2. Assess vascular access sites hourly  3. Every 4-6 hours minimum:  Change oxygen saturation probe site  4. Every 4-6 hours:  If on nasal continuous positive airway pressure, respiratory therapy assess nares and determine need for appliance change or resting period.   Outcome: Progressing

## 2022-04-27 PROCEDURE — 97535 SELF CARE MNGMENT TRAINING: CPT

## 2022-04-27 PROCEDURE — 97116 GAIT TRAINING THERAPY: CPT

## 2022-04-27 PROCEDURE — 97530 THERAPEUTIC ACTIVITIES: CPT

## 2022-04-27 PROCEDURE — 99232 SBSQ HOSP IP/OBS MODERATE 35: CPT | Performed by: PHYSICAL MEDICINE & REHABILITATION

## 2022-04-27 PROCEDURE — 6370000000 HC RX 637 (ALT 250 FOR IP): Performed by: PHYSICAL MEDICINE & REHABILITATION

## 2022-04-27 PROCEDURE — 99231 SBSQ HOSP IP/OBS SF/LOW 25: CPT | Performed by: INTERNAL MEDICINE

## 2022-04-27 PROCEDURE — 6360000002 HC RX W HCPCS: Performed by: PHYSICAL MEDICINE & REHABILITATION

## 2022-04-27 PROCEDURE — 97110 THERAPEUTIC EXERCISES: CPT

## 2022-04-27 PROCEDURE — 1180000000 HC REHAB R&B

## 2022-04-27 RX ADMIN — LATANOPROST 1 DROP: 50 SOLUTION OPHTHALMIC at 21:52

## 2022-04-27 RX ADMIN — ACETAMINOPHEN 650 MG: 325 TABLET ORAL at 16:02

## 2022-04-27 RX ADMIN — DORZOLAMIDE HYDROCHLORIDE AND TIMOLOL MALEATE 1 DROP: 20; 5 SOLUTION/ DROPS OPHTHALMIC at 08:08

## 2022-04-27 RX ADMIN — ROSUVASTATIN CALCIUM 5 MG: 10 TABLET, FILM COATED ORAL at 21:52

## 2022-04-27 RX ADMIN — PANTOPRAZOLE SODIUM 40 MG: 40 TABLET, DELAYED RELEASE ORAL at 05:46

## 2022-04-27 RX ADMIN — HEPARIN SODIUM 5000 UNITS: 5000 INJECTION INTRAVENOUS; SUBCUTANEOUS at 14:45

## 2022-04-27 RX ADMIN — HEPARIN SODIUM 5000 UNITS: 5000 INJECTION INTRAVENOUS; SUBCUTANEOUS at 21:52

## 2022-04-27 RX ADMIN — ACETAMINOPHEN 650 MG: 325 TABLET ORAL at 21:52

## 2022-04-27 RX ADMIN — HEPARIN SODIUM 5000 UNITS: 5000 INJECTION INTRAVENOUS; SUBCUTANEOUS at 05:46

## 2022-04-27 RX ADMIN — AMLODIPINE BESYLATE 10 MG: 10 TABLET ORAL at 08:10

## 2022-04-27 RX ADMIN — TRAMADOL HYDROCHLORIDE 50 MG: 50 TABLET, COATED ORAL at 08:05

## 2022-04-27 ASSESSMENT — PAIN DESCRIPTION - ORIENTATION
ORIENTATION: RIGHT
ORIENTATION: LOWER
ORIENTATION: LOWER
ORIENTATION: RIGHT;MID

## 2022-04-27 ASSESSMENT — PAIN DESCRIPTION - LOCATION
LOCATION: BACK
LOCATION: BUTTOCKS;LEG;BACK
LOCATION: BACK
LOCATION: BACK

## 2022-04-27 ASSESSMENT — PAIN DESCRIPTION - PAIN TYPE
TYPE: ACUTE PAIN;SURGICAL PAIN
TYPE: ACUTE PAIN;SURGICAL PAIN

## 2022-04-27 ASSESSMENT — PAIN SCALES - GENERAL
PAINLEVEL_OUTOF10: 5
PAINLEVEL_OUTOF10: 6
PAINLEVEL_OUTOF10: 6
PAINLEVEL_OUTOF10: 3
PAINLEVEL_OUTOF10: 6
PAINLEVEL_OUTOF10: 3

## 2022-04-27 ASSESSMENT — PAIN DESCRIPTION - DESCRIPTORS
DESCRIPTORS: ACHING
DESCRIPTORS: ACHING
DESCRIPTORS: ACHING;DULL
DESCRIPTORS: ACHING;NAGGING;DULL

## 2022-04-27 ASSESSMENT — PAIN DESCRIPTION - FREQUENCY
FREQUENCY: CONTINUOUS
FREQUENCY: CONTINUOUS

## 2022-04-27 ASSESSMENT — PAIN DESCRIPTION - ONSET
ONSET: GRADUAL
ONSET: ON-GOING

## 2022-04-27 NOTE — PROGRESS NOTES
Occupational Therapy  Facility/Department: [facility] ACUTE REHAB  Rehabilitation Occupational Therapy Daily Treatment Note    Date: 22  Patient Name: Arnel Antunez       Room: 4831/2360-22  MRN: [id number]  Account: [de-identified]   : 1950  (72 y.o.) Gender: female       Past Medical History:  has a past medical history of Arthritis, Chronic back pain, GERD (gastroesophageal reflux disease), Hyperlipidemia, Hypertension, MVP (mitral valve prolapse), and Polio. Past Surgical History:   has a past surgical history that includes Hysterectomy; Hemorrhoid surgery (); Leg Surgery; back surgery; Colonoscopy; Breast surgery; and Tonsillectomy. Restrictions  Restrictions/Precautions: Surgical Protocols; Fall Risk;General Precautions  Implants present? : Metal implants (Back surgery, R ankle surgery)  Other position/activity restrictions: No lifting greater than 1-2 lbs for 14 days (sx 22)  Required Braces or Orthoses?: Yes    Subjective  Subjective: \"My  will dress me. He doesn't know it, but he will\"  Pain Level: 6  Pain Location: Back  Pain Orientation: Lower  Restrictions/Precautions: Surgical Protocols; Fall Risk;General Precautions        Pain Assessment  Pain Assessment: 0-10  Pain Level: 6  Patient's Stated Pain Goal: 0 - No pain  Pain Location: Back  Pain Orientation: Lower  Pain Descriptors: Aching,Dull  Functional Pain Assessment: Activities are not prevented  Pain Type: Acute pain,Surgical pain  Pain Frequency: Continuous  Pain Onset: On-going  Non-Pharmaceutical Pain Intervention(s): Distraction,Ambulation/Increased Activity,Shower  Response to Pain Intervention: Patient satisfied  Side Effects: No reported side effects  Multiple Pain Sites: No    Objective  Cognition  Overall Cognitive Status: WFL  Orientation  Overall Orientation Status: Within Functional Limits         ADL  Feeding  Assistance Level: Modified independent  Skilled Clinical Factors: increased time, per pt report  Grooming/Oral Hygiene  Assistance Level: Modified independent  Skilled Clinical Factors: w/c level at sink  Upper Extremity Bathing  Equipment Provided: Long-handled sponge  Assistance Level: Set-up  Skilled Clinical Factors: Seated on TTB in shower; [address] [place] [address], S for back  Lower Extremity Bathing  Equipment Provided: Long-handled sponge  Assistance Level: Set-up  Skilled Clinical Factors: Seated on TTB in shower, [address], weight-shifting for ramón-care/buttocks.  LHS for feet  Upper Extremity Dressing  Assistance Level: Stand by assist;Verbal cues  Skilled Clinical Factors: OH shirt and corset brace  Lower Extremity Dressing  Equipment Provided: Reachers  Assistance Level: Stand by assist  Skilled Clinical Factors: Utilized reacher to doff pants/underwear at foot level; SBA for safety in standing  Putting On/Taking Off Footwear  Assistance Level: Minimal assistance  Skilled Clinical Factors: TA for TEDs; don/doff slippers with set-up A only  Toileting  Assistance Level: Stand by assist  Skilled Clinical Factors: SBA for safety in standing  Toilet Transfers  Equipment: Raised toilet seat with arms  Assistance Level: Stand by assist  Skilled Clinical Factors: Ambulated into BR with RW and completed transfer with SBA  Tub/Shower Transfers  Type: Shower  Transfer From: Rolling walker  Transfer To: Tub transfer bench  Additional Factors: Set-up  Assistance Level: Contact guard assist  Skilled Clinical Factors: Slight LOB when walking up ramp/over threshhold; recovered with CGA     Functional Mobility  Device: Rolling walker  Activity: To/From bathroom  Assistance Level: Stand by assist  Sit to Stand  Assistance Level: Stand by assist  Stand to Sit  Assistance Level: Stand by assist  Stand Pivot  Assistance Level: Stand by assist  Skilled Clinical Factors: w/RW        04/27/22 0929   Instrumental ADL's   Instrumental ADLs Yes   Meal Prep   Meal Prep Level Walker   Meal Prep Level of Assistance Stand by assistance   Meal Preparation Pt practiced transporting plate and bowl across the room, simulating the distance from her microwave at home to her DR table. Also discussed use of a board across walker (demo'd with slide board). Pt reports that spouse is \"handy\" and likes to create things, took a picture with her phone to send to him. Assessment  Assessment  Activity Tolerance: Patient tolerated treatment well  Discharge Recommendations: Home with assist PRN;Patient would benefit from continued therapy after discharge  OT Equipment Recommendations  Other: TBD  Safety Devices  Safety Devices in place: Yes    Patient Education  Education  Education Given To: Patient  Education Provided: Safety;ADL Function;IADL Function;Transfer Training;Equipment;DME/Home Modifications; Fall Prevention Strategies  Education Provided Comments: Discussed discharge planning and home safety  Education Method: Verbal;Demonstration; Teach Back  Barriers to Learning: None  Education Outcome: Verbalized understanding;Demonstrated understanding    Plan  Plan  Times per Week: 5-7  Times per Day: Twice a day  Current Treatment Recommendations: Self-Care / ADL; Strengthening;ROM;Balance training;Functional mobility training; Endurance training;Pain management; Safety education & training;Patient/Caregiver education & training;Equipment evaluation, education, & procurement;Home management training    Goals  Patient Goals   Patient goals : Destinee Horne now just get back to normal and hope that my pain is gone from my back. \"  Short Term Goals  Time Frame for Short term goals: By 1 week  Short Term Goal 1: Pt will complete lower body dressing/bathing with SBA and Good safety while maintaining back precautions  Short Term Goal 2: Pt will complete functional transfers/mobility during self care tasks with SBA and Good safety  Short Term Goal 3: Pt will complete upper body dressing with SBA and Good safety and compliance of back precautions  Short Term Goal 4: Pt will tolerate standing 5+ minutes during functional activity of choice with Good safety  Short Term Goal 5: Pt will verbalize/demonstrate good understanding AE/adaptive strategies/DME to assist in maintaining back precautions to increase safety and independence with self care and mobility  Short Term Goal 6: Pt will participate in 30+ minutes of theraputic exercises/functional activites to increase safety and independence with self care and mobility  Long Term Goals  Time Frame for Long term goals : By discharge  Long Term Goal 1: Pt will complete BADLs with modified independence and Good safety while maintaining back precautions  Long Term Goal 2: Pt will complete functional transfers/mobility during self care tasks with modified independence with Good safety  Long Term Goal 3: Pt will tolerate standing 10+ minutes during functional activity of choice with Good safety  Long Term Goal 4: Pt will complete simple meal prep/light house keeping task with Supervision and Good safety  Long Term Goal 5: Pt will verbalize/demonstrate Good understanding of home safety/fall prevention strategies to increase safety and independence with self care and mobility  Long Term Goal 6: Pt will demonstrate increased  strength during activities of daily living in R hand as evident by 5# improved  strength    Therapy Time   Individual Concurrent Group Co-treatment   Time In 0929         Time Out 1100         Minutes 1425 [facility]

## 2022-04-27 NOTE — FLOWSHEET NOTE
04/27/22 1527   Encounter Summary   Encounter Overview/Reason  Volunteer Encounter   Service Provided For: Patient   Referral/Consult From: Gene   Last Encounter  04/27/22  (V)   Complexity of Encounter Low   Spiritual/Emotional needs   Type Spiritual Support   Rituals, Rites and Sacraments   Type Alevism Communion   Assessment/Intervention/Outcome   Intervention Prayer (assurance of)/Preston

## 2022-04-27 NOTE — PLAN OF CARE
Problem: Discharge Planning  Goal: Discharge to home or other facility with appropriate resources  4/27/2022 1113 by Marizol Sena LPN  Outcome: Progressing     Problem: Safety - Adult  Goal: Free from fall injury  4/27/2022 1113 by Marizol Sena LPN  Outcome: Progressing     Problem: ABCDS Injury Assessment  Goal: Absence of physical injury  4/27/2022 1113 by Marizol Sena LPN  Outcome: Progressing     Problem: Pain  Goal: Verbalizes/displays adequate comfort level or baseline comfort level  4/27/2022 1113 by Marizol Sena LPN  Outcome: Progressing     Problem: Skin/Tissue Integrity  Goal: Absence of new skin breakdown  Description: 1. Monitor for areas of redness and/or skin breakdown  2. Assess vascular access sites hourly  3. Every 4-6 hours minimum:  Change oxygen saturation probe site  4. Every 4-6 hours:  If on nasal continuous positive airway pressure, respiratory therapy assess nares and determine need for appliance change or resting period.   4/27/2022 1113 by Marizol Sena LPN  Outcome: Progressing

## 2022-04-27 NOTE — PLAN OF CARE
Problem: Discharge Planning  Goal: Discharge to home or other facility with appropriate resources  4/27/2022 0440 by Imtiaz Roblero RN  Outcome: Progressing  4/26/2022 1607 by Kemal Mejía RN  Outcome: Progressing     Problem: Safety - Adult  Goal: Free from fall injury  4/27/2022 0440 by Imtiaz Roblero RN  Outcome: Progressing  4/26/2022 1607 by Kemal Mejía RN  Outcome: Progressing     Problem: ABCDS Injury Assessment  Goal: Absence of physical injury  4/27/2022 0440 by Imtiaz Roblero RN  Outcome: Progressing  4/26/2022 1607 by Kemal Mejía RN  Outcome: Progressing     Problem: Pain  Goal: Verbalizes/displays adequate comfort level or baseline comfort level  4/27/2022 0440 by Imtiaz Roblero RN  Outcome: Progressing  4/26/2022 1607 by Kemal Mejía RN  Outcome: Progressing     Problem: Skin/Tissue Integrity  Goal: Absence of new skin breakdown  Description: 1. Monitor for areas of redness and/or skin breakdown  2. Assess vascular access sites hourly  3. Every 4-6 hours minimum:  Change oxygen saturation probe site  4. Every 4-6 hours:  If on nasal continuous positive airway pressure, respiratory therapy assess nares and determine need for appliance change or resting period.   4/27/2022 0440 by Imtiaz Roblero RN  Outcome: Progressing  4/26/2022 1607 by Kemal Mejía RN  Outcome: Progressing

## 2022-04-27 NOTE — PROGRESS NOTES
Physical Therapy  Facility/Department: [facility] ACUTE REHAB  Rehabilitation Physical Therapy Initial Assessment    NAME: Bladimir Hathaway  : 1950 (66 y.o.)  MRN: [id number]  CODE STATUS: Full Code  Date of Service: 22    Chart Reviewed: Yes  General Comment  Comments: Pt is pleasant & talkative. Restrictions:  Restrictions/Precautions: Surgical Protocols; Fall Risk;General Precautions  Required Braces or Orthoses  Spinal: Lumbar Corset (donned when out of bed. )  Right Lower Extremity Brace: Ankle Foot Orthotics (Family brought in; Pt complains shoe doesn't fit well/AFO doesn't stay in shoe (very particular about donning))  Position Activity Restriction  Spinal Precautions: No Bending; No Lifting; No Twisting (for 2 weeks after surgery, avoid strenous activity)  Other position/activity restrictions: No lifting greater than 1-2 lbs for 14 days     SUBJECTIVE  Subjective: Pt states she hasn't worn her AFO since she got it, does not want to get a better-fitting pair of shoes unless she can wear AFO/doesn't wear AFO because they don't fit in her shoes. OBJECTIVE  Functional Mobility  Bed mobility  Sit to Supine: Stand by assistance  Scooting: Stand by assistance (hips laterally on bed)  Comment: HOB ~45º, 2 pillows, did not use rail. Transfers  Sit to Stand: Contact guard assistance  Stand to sit: Contact guard assistance  Bed to Chair: Stand by assistance (without device)  Stand Pivot Transfers: Stand by assistance (No device. )  Comment: Rolling walker, unless noted otherwise.    Balance  Posture: Fair  Sitting - Static: Good (EOB, no back or UE support)  Sitting - Dynamic: Good;- (EOB, no back or UE support)  Standing - Static: Fair;+ (with, without rolling walker)  Standing - Dynamic: Fair (rolling walker)    Environmental Mobility  Ambulation  Surface: level tile  Device: Rolling Walker  Other Apparatus: Right;AFO (PM )  Assistance: Contact guard assistance (progressing to SBA)  Quality of Gait: Forward flexion; fair but fleeting response to cues. Pt demonstrates improved foot clearance and swing phase with AFO (PM) than without (AM). Gait Deviations: Increased YASMANI; Slow Tracee;Decreased step height;Decreased step length;Decreased head and trunk rotation  Distance: 120' AM without AFO; 120'x2 (seated rest between) and short distances in gym PM with AFO  Comments: Educated Pt to be aware of skin integrity in areas where AFO can rub/irritate. PT Exercises  Exercise Treatment: Sit <> stand x5  PROM Exercises: R dorsiflexion/plantarflexion, 20x each  Resistive Exercises: Seated LE exercises, 1.5#, orange (minimal) resistance band x 15 reps, B LE. Standing Open/Closed Kinetic Chain Exercises: Bilateral LE exercises, 10x each, bilateral UE support in parallel bars. Exercise Equipment: NuStep, workload 3, 12 min. ASSESSMENT  Vitals  O2 Device: None (Room air)    Activity Tolerance  Activity Tolerance: Patient limited by endurance; Patient tolerated treatment well    Assessment  Discharge Recommendations: Home with assist PRN;Patient would benefit from continued therapy after discharge  PT D/C Equipment  Other: Pt has a borrowed rolling walker    GOALS  Patient Goals   Patient goals : Get stronger  Short Term Goals  Time Frame for Short term goals: 1 week  Short term goal 1: Pt able to roll in bed maintaingn Back precautions independently  Short term goal 2: Pt able to perfrom supien <>sit SBA  Short term goal 3: Pt ableto transfers SBA wiht rolling walker  Short term goal 4:  Pt able to ambulate 150 ft with rolling walker distance of 150 ft, SBA  Short term goal 5: Pt able to go up and down 5 steps with 2 UE support, min A  Long Term Goals  Time Frame for Long term goals : By DC  Long term goal 1: Pt able to perform supine<>sit mod-I  Long term goal 2: Pt able to transfer mod-I from various surfaces with rolling walker  Long term goal 3: Pt able to ambulte with rolling walker on leel as well as outside radha at [facility] distnace of atleast 200 ft. Long term goal 4: Pt to improve 2MW distance to 150ft to improve fucntion and gait speed  Long term goal 5: Pt able to go up and down 5 to 12 steps with 1 UE support, SBA    PLAN OF CARE  Frequency: 1-2 treatment sessions per day, 5-7 days per week  Plan  Plan:  minutes of therapy at least 5 out of 7 days a week  Current Treatment Recommendations: Strengthening;Balance training;Functional mobility training;Transfer training; Endurance training;Gait training;Stair training; Safety education & training;Patient/Caregiver education & training;Equipment evaluation, education, & procurement  Safety Devices  Type of Devices: All fall risk precautions in place;Call light within reach;Gait belt;Patient at risk for falls; Left in bed;Bed alarm in place    Therapy Time     04/27/22 0833 04/27/22 1412   PT Individual Minutes   Time In 0833 1348   Time Out 0905 1455   Minutes 32 67   Minute Variance   Variance  --  8   Reason  --  Make up minutes  (for 4/25/22)     Kurtis Severin, PTA, 04/27/22 at 4:41 PM

## 2022-04-27 NOTE — PROGRESS NOTES
[facility] Internal Medicine    CONSULTATION / HISTORY AND PHYSICAL EXAMINATION            Date:   4/27/2022  Patient name:  Bladimir Hathaway  Date of admission:  4/23/2022 12:09 PM  MRN:   [id number]  Account:  [de-identified]  YOB: 1950  PCP:    Irlanda Bailey MD  Room:   [facility]  Code Status:    Full Code    Physician Requesting Consult: Hamilton Thompson MD    Reason for Consult: Medical management    Chief Complaint:     No chief complaint on file.     Debility    History Obtained From:     patient, electronic medical record    History of Present Illness/ Interval History:     s/p lumbar spine decompression for lumbar spine stenosis  54-year-old female with history of previous back surgeries had progressively worsening low back pain for several years.  Notes worsening with walking.  Weakness to lower extremities with increased activity.  She has tried aqua therapy.  Also history of polio with residual right foot drop.  She uses a walker to assist with ambulation.  She was noted to have spinal stenosis L4-L5 level.  Underwent L2-5 laminectomy and interbody fusion L4-5.  Expandable disc spacer interbody L4-5.  Postop developed hypoxia Placed on 3 L oxygen.  On heparin for DVT prophylaxis    Medical history includes hypertension hyperlipidemia GERD    HTN  Onset more than 2 years ago  Charity: mild to mod  Usually controlled with current po meds  Not associated with headaches or blurry vision  No chest pain      Past Medical History:     Past Medical History:   Diagnosis Date    Arthritis     Chronic back pain     ddd    GERD (gastroesophageal reflux disease)     Hyperlipidemia     Hypertension     MVP (mitral valve prolapse)     Polio     right leg at age 2        Past Surgical History:     Past Surgical History:   Procedure Laterality Date    BACK SURGERY      BREAST SURGERY      COLONOSCOPY      HEMORRHOID SURGERY  1988    HYSTERECTOMY      LEG SURGERY fused right heel and right leg lengthened-due to polio    TONSILLECTOMY          Medications Prior to Admission:     Prior to Admission medications    Medication Sig Start Date End Date Taking? Authorizing Provider   alendronate (FOSAMAX) 70 MG tablet Take 1 tablet by mouth every 7 days 7/19/16   Swati Gonzalez MD   hydrocortisone (ANUSOL-HC) 2.5 % rectal cream Place rectally 2 times daily. 5/31/16   Jaime Jones MD   simvastatin (ZOCOR) 20 MG tablet Take 1 tablet by mouth nightly 4/21/16   Swati Gonzalez MD   losartan-hydrochlorothiazide [facility]) 100-25 MG per tablet Take 1 tablet by mouth daily 4/21/16   Swati Gonzalez MD   clobetasol (TEMOVATE) 0.05 % cream Pt uses twice weekly 9/24/15   Swati Gonzalez MD   Fluocinolone Acetonide 0.01 % OIL Place 1 drop in ear(s) nightly 7/7/15   Jaime Jones MD   ibuprofen (ADVIL;MOTRIN) 800 MG tablet Take 800 mg by mouth every 8 hours as needed for Pain. 5/28/14 7/19/16  Jaime Jones MD   timolol (TIMOPTIC-XR) 0.5 % ophthalmic gel-forming Place 1 drop into both eyes daily. 9/9/12   Historical Provider, MD   Multiple Vitamins-Minerals (PRESERVISION/LUTEIN) CAPS Take 1 capsule by mouth daily. Historical Provider, MD   Omeprazole-Sodium Bicarbonate (ZEGERID OTC)  MG CAPS Take  by mouth daily. Historical Provider, MD        Allergies:     Crestor [rosuvastatin], Darvocet-n 100 [propoxyphene n-acetaminophen], Equagesic, Lipitor, and Relafen [nabumetone]    Social History:     Tobacco:    reports that she has never smoked. She has never used smokeless tobacco.  Alcohol:      reports no history of alcohol use. Drug Use:  reports no history of drug use. Family History:     Family History   Problem Relation Age of Onset    Cancer Mother         breast    Heart Disease Father        Review of Systems:     Positive and Negative as described in HPI.     Denies any shortness of breath or cough  Denies chest pain or palpitations  Denies abdominal pain, diarrhea vomiting  Denies any new numbness tremors or weakness. Physical Exam:     BP (!) 151/93   Pulse 85   Temp 97.9 °F (36.6 °C)   Resp 16   Ht 5' 4\" (1.626 m)   Wt 182 lb (82.6 kg)   SpO2 94%   BMI 31.24 kg/m²   Temp (24hrs), Av.1 °F (36.7 °C), Min:97.9 °F (36.6 °C), Max:98.2 °F (36.8 °C)      General appearance:  alert, cooperative and no distress  Eyes: Anicteric sclera. Pupils are equally round and reactive to light. Extraocular movements are intact.   Lungs:  clear to auscultation bilaterally, normal effort  Heart:  regular rate and rhythm, no murmur  Abdomen:  soft, nontender, nondistended, normal bowel sounds, no masses, hepatomegaly, splenomegaly  Extremities:  no edema, redness, tenderness in the calves  Skin:  no gross lesions, rashes, induration  Neuro:  Alert, oriented X 3, no new focal weakness    Investigations:      Laboratory Testing:  Lab Results   Component Value Date    WBC 10.6 2022    HGB 11.6 (L) 2022    HCT 34.5 (L) 2022    MCV 90.2 2022     2022     Lab Results   Component Value Date     2022    K 4.2 2022     2022    CO2 26 2022    BUN 25 2022    CREATININE 1.17 2022    GLUCOSE 109 2022    CALCIUM 9.7 2022         Assessment :      Primary Problem  Debility    Active Hospital Problems    Diagnosis Date Noted    Non-intractable vomiting with nausea [R11.2] 2022     Priority: Medium    Status post lumbar spine surgery for decompression of spinal cord [Z98.890] 2022     Priority: Medium    Debility [R53.81] 2022     Priority: Medium    HTN (hypertension) [I10] 2012    Hypertriglyceridemia [E78.1] 2012       Plan:     Principal Problem:    Debility  Active Problems:    Non-intractable vomiting with nausea    Status post lumbar spine surgery for decompression of spinal cord    HTN (hypertension)    Hypertriglyceridemia  Resolved Problems:    * No resolved hospital problems. *        1. Status post lumbar spine surgery  2. Hypertension continue amlodipine, currently controlled  3. Hyperlipidemia-continue statin  4. GERD-continue Protonix    DVT prophylaxis-heparin      4/27  Patient reports no new complaints. Engaging with physical therapy. Labs and vitals reviewed. No new issues. Continue with current care. Consultations:   Ace Steven MD  4/27/2022  1:43 PM    Copy sent to Dr. Kaleb Negron MD    Please note that this chart was generated using voice recognition Dragon dictation software. Although every effort was made to ensure the accuracy of this automated transcription, some errors in transcription may have occurred.

## 2022-04-27 NOTE — PROGRESS NOTES
Physical Medicine & Rehabilitation  Progress Note      Subjective:      66year-old with lumbar stenosis who is s/p L2-5 laminectomy and L4-5 fusion. Patient is reporting good pain control. She denies any new issues with sleep, appetite, bowel, or bladder. She has some eye irritation related to eye drops not being preservative free. ROS:  Denies fevers, chills, sweats. No chest pain, palpitations, lightheadedness. Denies coughing, wheezing or shortness of breath. Denies abdominal pain, nausea, diarrhea. No new areas of joint pain. Denies new areas of numbness or weakness. Denies new anxiety or depression issues. No new skin problems. Rehabilitation:   Progressing in therapies. PT:  Restrictions/Precautions: Surgical Protocols,Fall Risk,General Precautions  Implants present? : Metal implants (Back surgery, R ankle surgery)  Other position/activity restrictions: No lifting greater than 1-2 lbs for 14 days  Required Braces or Orthoses  Spinal: Lumbar Corset  Right Lower Extremity Brace: Ankle Foot Orthotics (Does not used, asked if family can bring here to assess.)   Transfers  Sit to Stand: Contact guard assistance  Stand to sit: Contact guard assistance  Bed to Chair: Contact guard assistance  Comment: Transfers completed with RW. No LOB noted. Transfers completed from bed, toilet, and w/c. Ambulation  Surface: level tile  Device: Rolling Walker  Assistance: Contact guard assistance  Quality of Gait: WBOS, R Decreased foot clearance, L Decreased foot clearance, Decreased claire, Forward trunk, R Foot flat, L Foot flat. Writer encouraged/edu pt on the benefits of amb with AFO on.   Gait Deviations: Increased YASMANI,Decreased step length,Decreased step height,Slow Claire  Distance: 75'x2 and 30'x2 (AM)  Comments: Easily fatigues. Rest breaks PRN.      Transfers  Sit to Stand: Contact guard assistance  Stand to sit: Contact guard assistance  Bed to Chair: Contact guard assistance  Comment: Transfers completed with RW. No LOB noted. Transfers completed from bed, toilet, and w/c. Ambulation  Surface: level tile  Device: Rolling Walker  Assistance: Contact guard assistance  Quality of Gait: WBOS, R Decreased foot clearance, L Decreased foot clearance, Decreased claire, Forward trunk, R Foot flat, L Foot flat. Writer encouraged/edu pt on the benefits of amb with AFO on.   Gait Deviations: Increased YASMANI,Decreased step length,Decreased step height,Slow Claire  Distance: 75'x2 and 30'x2 (AM)  Comments: Easily fatigues. Rest breaks PRN. Surface: level tile  Ambulation  Surface: level tile  Device: Rolling Walker  Assistance: Contact guard assistance  Quality of Gait: WBOS, R Decreased foot clearance, L Decreased foot clearance, Decreased claire, Forward trunk, R Foot flat, L Foot flat. Writer encouraged/edu pt on the benefits of amb with AFO on.   Gait Deviations: Increased YASMANI,Decreased step length,Decreased step height,Slow Claire  Distance: 75'x2 and 30'x2 (AM)  Comments: Easily fatigues. Rest breaks PRN. OT:  ADL  Feeding: Modified independent   Feeding Skilled Clinical Factors: per pt report with increased time  Grooming: Stand by assistance  Grooming Skilled Clinical Factors: sitting EOB  UE Bathing: Stand by assistance  UE Bathing Skilled Clinical Factors: Sitting on tub bench with verbal cues for back precautions  LE Bathing: Moderate assistance  LE Bathing Skilled Clinical Factors: A with bilateral lower legs/feet and bottom  UE Dressing:  Moderate assistance  UE Dressing Skilled Clinical Factors: A with doffing/donning back brace  LE Dressing: Maximum assistance  LE Dressing Skilled Clinical Factors: A with doffing/donning bilateral socks and A with threading LLE with increased time and verbal cues for back precautions  Toileting: Minimal assistance  Toileting Skilled Clinical Factors: A for bottom hygiene  Additional Comments: OT facilitated pts engagement in full shower on this date with use of tub bench, grab bars, and hand held shower head. Lunbar corset doffed while sitting on tub bench. Pt required A with all lower body self care tasks at this time. Pt currently limited due to decerased strength, balance, activity tolerance, nausea, and pain limiting safety and independence with self care tasls. PM: OT introduced patient to AE (reacher, sock aid, and long handle sponge) to be used during self care tasks during next session. Balance  Sitting Balance: Stand by assistance  Standing Balance: Minimal assistance (Min A - CGA)   Standing Balance  Time: 1-2 minutes x 4  Activity: Functional transfers/mobility, self care tasks  Comment: Verbal cues for hand placement and safety  Functional Mobility  Functional - Mobility Device: Rolling Walker  Activity: To/from bathroom  Assist Level: Minimal assistance (min A - CGA)     Bed mobility  Rolling to Left: Minimal assistance  Rolling to Right: Minimal assistance  Supine to Sit: Minimal assistance,Contact guard assistance  Sit to Supine: Contact guard assistance  Scooting: Minimal assistance  Comment: Hospital bed, bed rail utilized on left side. HOB elevated. Transfers  Sit to stand: Minimal assistance (min A -CGA)  Stand to sit: Contact guard assistance  Transfer Comments: Verbal cues for hand placement and safety   Toilet Transfers  Toilet - Technique: Ambulating  Equipment Used: Raised toilet seat with rails  Toilet Transfer: Minimal assistance  Toilet Transfers Comments: Verbal cues for hand placement and safety     Shower Transfers  Shower - Transfer From: Walker  Shower - Transfer Type: To and From  Shower - Transfer To:  Transfer tub bench  Shower - Technique: Ambulating  Shower Transfers: Minimal assistance  Shower Transfers Comments: Verbal cues for safety over threshold       SPEECH:      Objective:  BP (!) 151/93   Pulse 85   Temp 97.9 °F (36.6 °C)   Resp 16   Ht 5' 4\" (1.626 m)   Wt 182 lb (82.6 kg)   SpO2 94%   BMI 31.24 kg/m² GEN: well developed, well nourished, NAD  HEENT: NCAT, PERRL, EOMI, mucous membranes pink and moist  CV: RRR, no murmurs, rubs or gallops  PULM: CTAB, no rales or rhonchi. Respirations WNL and unlabored  ABD: soft, NT, ND, BS+ and equal  NEURO: A&O x3. Sensation intact to light touch. MSK: Functional ROM BUEs, some weakness impairing AROM BLEs . Strength 5/5 key muscles BUEs, 4+/5 key muscles BLEs. EXTREMITIES: No calf tenderness to palpation bilaterally. No edema BLEs  SKIN: warm dry and intact with good turgor. Midline low back incision well approximated with no erythema induration or drainage  PSYCH: appropriately interactive. Affect WNL. Diagnostics:     CBC:   No results for input(s): WBC, RBC, HGB, HCT, MCV, RDW, PLT in the last 72 hours. BMP:   No results for input(s): NA, K, CL, CO2, PHOS, BUN, CREATININE, CA, GLUCOSE in the last 72 hours. BNP: No results for input(s): BNP in the last 72 hours. PT/INR: No results for input(s): PROTIME, INR in the last 72 hours. APTT: No results for input(s): APTT in the last 72 hours. CARDIAC ENZYMES: No results for input(s): CKMB, CKMBINDEX, TROPONINT in the last 72 hours. Invalid input(s): CKTOTAL;3 troponins   FASTING LIPID PANEL:  Lab Results   Component Value Date    CHOL 223 (H) 07/19/2016    HDL 38 (L) 07/17/2017    TRIG 314 (H) 07/19/2016     LIVER PROFILE: No results for input(s): AST, ALT, ALB, BILIDIR, BILITOT, ALKPHOS in the last 72 hours.      Current Medications:   Current Facility-Administered Medications: albuterol sulfate  (90 Base) MCG/ACT inhaler 2 puff, 2 puff, Inhalation, Q6H PRN  latanoprost (XALATAN) 0.005 % ophthalmic solution 1 drop, 1 drop, Both Eyes, Nightly  ondansetron (ZOFRAN) tablet 4 mg, 4 mg, Oral, Q8H PRN  acetaminophen (TYLENOL) tablet 650 mg, 650 mg, Oral, Q4H PRN  polyethylene glycol (GLYCOLAX) packet 17 g, 17 g, Oral, Daily  senna (SENOKOT) tablet 17.2 mg, 2 tablet, Oral, Daily PRN  bisacodyl (DULCOLAX) suppository 10 mg, 10 mg, Rectal, Daily PRN  amLODIPine (NORVASC) tablet 10 mg, 10 mg, Oral, Daily  dorzolamide-timolol (COSOPT) 22.3-6.8 MG/ML ophthalmic solution 1 drop, 1 drop, Both Eyes, BID  montelukast (SINGULAIR) tablet 10 mg, 10 mg, Oral, Daily  mupirocin (BACTROBAN) 2 % ointment, , Topical, BID  rosuvastatin (CRESTOR) tablet 5 mg, 5 mg, Oral, Nightly  tiZANidine (ZANAFLEX) tablet 4 mg, 4 mg, Oral, Q6H PRN  traMADol (ULTRAM) tablet 50 mg, 50 mg, Oral, Q6H PRN  vitamin D (ERGOCALCIFEROL) capsule 50,000 Units, 50,000 Units, Oral, Weekly  heparin (porcine) injection 5,000 Units, 5,000 Units, SubCUTAneous, 3 times per day  lidocaine 4 % external patch 2 patch, 2 patch, TransDERmal, Daily  pantoprazole (PROTONIX) tablet 40 mg, 40 mg, Oral, QAM AC      Impression/Plan:   Impaired ADLs, gait, and mobility due to:      1. Debility after lumbar laminectomy and fusion for stenosis: Performed by Dr. Hammad Sawant 4/18/22. PT/OT  for gait, mobility, strengthening, endurance, ADLs, and self care. Has Ultram, Tylenol, Zanaflex prn, Lidocaine patch. 2. Acute Respiratory Failure: Post-op. Improved. Has albuterol and Singulair - refusing per respiratory therapy  3. Hx polio with R foot drop: history of R heel and leg lengthening  4. HTN/HLD: on amlodipine, crestor  5. Nausea/vomiting: started on pantoprazole per IM. 6. Vitamin D deficiency: on weekly repletion  7. Glaucoma: Cosopt - may use home supply of preservative free, latanoprost nightly  8. Bowel Management: Miralax daily, Senokot prn, Dulcolax prn - recommend suppository today  9. Internal medicine for medical management  10.  DVT prophylaxis:  On heparin, SCDs and NATHAN stockings      Electronically signed by Whitney Ragland MD on 4/27/2022 at 9:09 AM      This note is created with the assistance of a speech recognition program.  While intending to generate a document that actually reflects the content of the visit, the document can still have some errors including those of syntax and sound a like substitutions which may escape proof reading. In such instances, actual meaning can be extrapolated by contextual diversion.

## 2022-04-28 PROCEDURE — 99231 SBSQ HOSP IP/OBS SF/LOW 25: CPT | Performed by: INTERNAL MEDICINE

## 2022-04-28 PROCEDURE — 6370000000 HC RX 637 (ALT 250 FOR IP): Performed by: PHYSICAL MEDICINE & REHABILITATION

## 2022-04-28 PROCEDURE — 97530 THERAPEUTIC ACTIVITIES: CPT

## 2022-04-28 PROCEDURE — 6360000002 HC RX W HCPCS: Performed by: PHYSICAL MEDICINE & REHABILITATION

## 2022-04-28 PROCEDURE — 97535 SELF CARE MNGMENT TRAINING: CPT

## 2022-04-28 PROCEDURE — 97110 THERAPEUTIC EXERCISES: CPT

## 2022-04-28 PROCEDURE — 97116 GAIT TRAINING THERAPY: CPT

## 2022-04-28 PROCEDURE — 1180000000 HC REHAB R&B

## 2022-04-28 PROCEDURE — 99231 SBSQ HOSP IP/OBS SF/LOW 25: CPT | Performed by: PHYSICAL MEDICINE & REHABILITATION

## 2022-04-28 RX ADMIN — POLYETHYLENE GLYCOL 3350 17 G: 17 POWDER, FOR SOLUTION ORAL at 07:43

## 2022-04-28 RX ADMIN — LATANOPROST 1 DROP: 50 SOLUTION OPHTHALMIC at 20:57

## 2022-04-28 RX ADMIN — ACETAMINOPHEN 650 MG: 325 TABLET ORAL at 15:00

## 2022-04-28 RX ADMIN — ROSUVASTATIN CALCIUM 5 MG: 10 TABLET, FILM COATED ORAL at 20:59

## 2022-04-28 RX ADMIN — ACETAMINOPHEN 650 MG: 325 TABLET ORAL at 07:43

## 2022-04-28 RX ADMIN — AMLODIPINE BESYLATE 10 MG: 10 TABLET ORAL at 07:43

## 2022-04-28 RX ADMIN — HEPARIN SODIUM 5000 UNITS: 5000 INJECTION INTRAVENOUS; SUBCUTANEOUS at 20:59

## 2022-04-28 RX ADMIN — HEPARIN SODIUM 5000 UNITS: 5000 INJECTION INTRAVENOUS; SUBCUTANEOUS at 06:18

## 2022-04-28 RX ADMIN — DORZOLAMIDE HYDROCHLORIDE AND TIMOLOL MALEATE 1 DROP: 20; 5 SOLUTION/ DROPS OPHTHALMIC at 08:07

## 2022-04-28 RX ADMIN — HEPARIN SODIUM 5000 UNITS: 5000 INJECTION INTRAVENOUS; SUBCUTANEOUS at 14:44

## 2022-04-28 RX ADMIN — PANTOPRAZOLE SODIUM 40 MG: 40 TABLET, DELAYED RELEASE ORAL at 06:18

## 2022-04-28 RX ADMIN — SENNOSIDES 17.2 MG: 8.6 TABLET, FILM COATED ORAL at 03:05

## 2022-04-28 ASSESSMENT — PAIN DESCRIPTION - LOCATION
LOCATION: BACK;LEG
LOCATION: HEAD
LOCATION: BACK;LEG

## 2022-04-28 ASSESSMENT — PAIN DESCRIPTION - ORIENTATION
ORIENTATION: LEFT
ORIENTATION: INNER
ORIENTATION: LEFT

## 2022-04-28 ASSESSMENT — PAIN SCALES - GENERAL
PAINLEVEL_OUTOF10: 4
PAINLEVEL_OUTOF10: 4
PAINLEVEL_OUTOF10: 2
PAINLEVEL_OUTOF10: 2
PAINLEVEL_OUTOF10: 3
PAINLEVEL_OUTOF10: 4

## 2022-04-28 ASSESSMENT — PAIN DESCRIPTION - DESCRIPTORS
DESCRIPTORS: ACHING
DESCRIPTORS: THROBBING
DESCRIPTORS: ACHING;DISCOMFORT
DESCRIPTORS: ACHING
DESCRIPTORS: ACHING;BURNING;DISCOMFORT

## 2022-04-28 ASSESSMENT — PAIN - FUNCTIONAL ASSESSMENT: PAIN_FUNCTIONAL_ASSESSMENT: ACTIVITIES ARE NOT PREVENTED

## 2022-04-28 ASSESSMENT — PAIN DESCRIPTION - ONSET
ONSET: ON-GOING
ONSET: ON-GOING

## 2022-04-28 ASSESSMENT — PAIN DESCRIPTION - FREQUENCY
FREQUENCY: CONTINUOUS
FREQUENCY: CONTINUOUS

## 2022-04-28 ASSESSMENT — PAIN DESCRIPTION - PAIN TYPE: TYPE: ACUTE PAIN;SURGICAL PAIN

## 2022-04-28 ASSESSMENT — PAIN SCALES - WONG BAKER
WONGBAKER_NUMERICALRESPONSE: 2
WONGBAKER_NUMERICALRESPONSE: 2

## 2022-04-28 NOTE — FLOWSHEET NOTE
04/28/22 1738   Encounter Summary   Encounter Overview/Reason  Volunteer Encounter   Service Provided For: Patient   Referral/Consult From: Gene   Last Encounter  04/28/22  (V)   Complexity of Encounter Low   Spiritual/Emotional needs   Type Spiritual Support   Rituals, Rites and [address]

## 2022-04-28 NOTE — PROGRESS NOTES
[place] [address] Internal Medicine    CONSULTATION / HISTORY AND PHYSICAL EXAMINATION            Date:   4/28/2022  Patient name:  Brittany Chadwick  Date of admission:  4/23/2022 12:09 PM  MRN:   [id number]  Account:  [de-identified]  YOB: 1950  PCP:    Jacob Taylor MD  Room:   [facility]-  Code Status:    Full Code    Physician Requesting Consult: Jeffery Banegas MD    Reason for Consult: Medical management    Chief Complaint:     No chief complaint on file.     Debility    History Obtained From:     patient, electronic medical record    History of Present Illness/ Interval History:     s/p lumbar spine decompression for lumbar spine stenosis  59-year-old female with history of previous back surgeries had progressively worsening low back pain for several years.  Notes worsening with walking.  Weakness to lower extremities with increased activity.  She has tried aqua therapy.  Also history of polio with residual right foot drop.  She uses a walker to assist with ambulation.  She was noted to have spinal stenosis L4-L5 level.  Underwent L2-5 laminectomy and interbody fusion L4-5.  Expandable disc spacer interbody L4-5.  Postop developed hypoxia Placed on 3 L oxygen.  On heparin for DVT prophylaxis    Medical history includes hypertension hyperlipidemia GERD    HTN  Onset more than 2 years ago  Charity: mild to mod  Usually controlled with current po meds  Not associated with headaches or blurry vision  No chest pain      Past Medical History:     Past Medical History:   Diagnosis Date    Arthritis     Chronic back pain     ddd    GERD (gastroesophageal reflux disease)     Hyperlipidemia     Hypertension     MVP (mitral valve prolapse)     Polio     right leg at age 2        Past Surgical History:     Past Surgical History:   Procedure Laterality Date    BACK SURGERY      BREAST SURGERY      COLONOSCOPY      HEMORRHOID SURGERY  1988    HYSTERECTOMY      LEG SURGERY fused right heel and right leg lengthened-due to polio    TONSILLECTOMY          Medications Prior to Admission:     Prior to Admission medications    Medication Sig Start Date End Date Taking? Authorizing Provider   alendronate (FOSAMAX) 70 MG tablet Take 1 tablet by mouth every 7 days 7/19/16   Dora Wolf MD   hydrocortisone (ANUSOL-HC) 2.5 % rectal cream Place rectally 2 times daily. 5/31/16   Yolie Das MD   simvastatin (ZOCOR) 20 MG tablet Take 1 tablet by mouth nightly 4/21/16   Dora Wolf MD   losartan-hydrochlorothiazide [facility]) 100-25 MG per tablet Take 1 tablet by mouth daily 4/21/16   Dora Wolf MD   clobetasol (TEMOVATE) 0.05 % cream Pt uses twice weekly 9/24/15   Dora Wolf MD   Fluocinolone Acetonide 0.01 % OIL Place 1 drop in ear(s) nightly 7/7/15   Yolie Das MD   ibuprofen (ADVIL;MOTRIN) 800 MG tablet Take 800 mg by mouth every 8 hours as needed for Pain. 5/28/14 7/19/16  Yolie Das MD   timolol (TIMOPTIC-XR) 0.5 % ophthalmic gel-forming Place 1 drop into both eyes daily. 9/9/12   Historical Provider, MD   Multiple Vitamins-Minerals (PRESERVISION/LUTEIN) CAPS Take 1 capsule by mouth daily. Historical Provider, MD   Omeprazole-Sodium Bicarbonate (ZEGERID OTC)  MG CAPS Take  by mouth daily. Historical Provider, MD        Allergies:     Crestor [rosuvastatin], Darvocet-n 100 [propoxyphene n-acetaminophen], Equagesic, Lipitor, and Relafen [nabumetone]    Social History:     Tobacco:    reports that she has never smoked. She has never used smokeless tobacco.  Alcohol:      reports no history of alcohol use. Drug Use:  reports no history of drug use. Family History:     Family History   Problem Relation Age of Onset    Cancer Mother         breast    Heart Disease Father        Review of Systems:     Positive and Negative as described in HPI.     Denies any shortness of breath or cough  Denies chest pain or palpitations  Denies abdominal pain, diarrhea vomiting  Denies any new numbness tremors or weakness. Physical Exam:     /76   Pulse 79   Temp 98 °F (36.7 °C)   Resp 19   Ht 5' 4\" (1.626 m)   Wt 180 lb 3.2 oz (81.7 kg)   SpO2 97%   BMI 30.93 kg/m²   Temp (24hrs), Av °F (36.7 °C), Min:97.9 °F (36.6 °C), Max:98 °F (36.7 °C)      General appearance:  alert, cooperative and no distress  Eyes: Anicteric sclera. Pupils are equally round and reactive to light. Extraocular movements are intact.   Lungs:  clear to auscultation bilaterally, normal effort  Heart:  regular rate and rhythm, no murmur  Abdomen:  soft, nontender, nondistended, normal bowel sounds, no masses, hepatomegaly, splenomegaly  Extremities:  no edema, redness, tenderness in the calves  Skin:  no gross lesions, rashes, induration  Neuro:  Alert, oriented X 3, no new focal weakness    Investigations:      Laboratory Testing:  Lab Results   Component Value Date    WBC 10.6 2022    HGB 11.6 (L) 2022    HCT 34.5 (L) 2022    MCV 90.2 2022     2022     Lab Results   Component Value Date     2022    K 4.2 2022     2022    CO2 26 2022    BUN 25 2022    CREATININE 1.17 2022    GLUCOSE 109 2022    CALCIUM 9.7 2022         Assessment :      Primary Problem  Debility    Active Hospital Problems    Diagnosis Date Noted    Non-intractable vomiting with nausea [R11.2] 2022     Priority: Medium    Status post lumbar spine surgery for decompression of spinal cord [Z98.890] 2022     Priority: Medium    Debility [R53.81] 2022     Priority: Medium    HTN (hypertension) [I10] 2012    Hypertriglyceridemia [E78.1] 2012       Plan:     Principal Problem:    Debility  Active Problems:    Non-intractable vomiting with nausea    Status post lumbar spine surgery for decompression of spinal cord    HTN (hypertension)    Hypertriglyceridemia  Resolved Problems:    * No resolved hospital problems. *        1. Status post lumbar spine surgery  2. Hypertension continue amlodipine, currently controlled  3. Hyperlipidemia-continue statin  4. GERD-continue Protonix    DVT prophylaxis-heparin    4/28  Periorbital redness noted today- pt reports this is due to her eyedrops being ones with preservative; she need preservative free ones which have been ordred now. No vsion change    Patient reports no new complaints. Engaging with physical therapy. Labs and vitals reviewed. No new issues. Continue with current care. Consultations:   Chacho Perez MD  4/28/2022  2:06 PM    Copy sent to Dr. Ngozi Membreno MD    Please note that this chart was generated using voice recognition Dragon dictation software. Although every effort was made to ensure the accuracy of this automated transcription, some errors in transcription may have occurred.

## 2022-04-28 NOTE — PROGRESS NOTES
Physical Therapy  [address]  Acute Rehabilitation Physical Therapy Progress Note    Date: 22  Patient Name: Nathen Carlos       Room: 9816/7038-76  MRN: [id number]   Account: [de-identified]   : 1950  (66 y.o.) Gender: female     Referring Practitioner: Nellie Sesay MD  Diagnosis: Debility  Past Medical History:  has a past medical history of Arthritis, Chronic back pain, GERD (gastroesophageal reflux disease), Hyperlipidemia, Hypertension, MVP (mitral valve prolapse), and Polio. Past Surgical History:   has a past surgical history that includes Hysterectomy; Hemorrhoid surgery (); Leg Surgery; back surgery; Colonoscopy; Breast surgery; and Tonsillectomy. Restrictions/Precautions  Restrictions/Precautions: Surgical Protocols; Fall Risk;General Precautions  Required Braces or Orthoses?: Yes  Implants present? : Metal implants (Back surgery, R ankle surgery)  Required Braces or Orthoses  Spinal: Lumbar Corset (donned when out of bed. )  Right Lower Extremity Brace: Ankle Foot Orthotics (Family brought in; Pt complains shoe doesn't fit well/AFO doesn't stay in shoe (very particular about donning))  Position Activity Restriction  Spinal Precautions: No Bending; No Lifting; No Twisting (strenuous activity  (sx 22))  Other position/activity restrictions: No lifting greater than 1-2 lbs for 14 days (sx 22)       Comments: Pt continues to report she \"needs to have a BM\". Did not sleep well last night either. Pain Assessment: 0-10  Pain Level: 4  Pain Location: Back;Leg  Pain Orientation: Left                Bed Mobility:   Bed Mobility  Supine to Sit: Supervision (log roll technique)  Sit to Supine: Supervision (log roll technique)  Scooting: Modified independent  Comment: Completed on flat mat with 3 pillows. Pt ed in log roll technique to help protect her back.  Able to demonstrate understanding   Bed mobility  Scooting: Modified independent    Transfers:  Sit to Stand: Stand by assistance  Stand to sit: Stand by assistance (cues to reach back and for safety w/RW)  Bed to Chair: Stand by assistance (without device)              Ambulation  Surface: level tile  Device: Rolling Walker  Other Apparatus: AFO; Right (back brace)  Assistance: Stand by assistance  Quality of Gait: fwd flexed posture, cues to correct. Fair but fleeting return. No catching of R foot with amb using AFO this morning. Gait Deviations: Increased YASMANI; Decreased step length;Decreased step height  Distance: 139ft  Comments: Pt able to amb from room down to therapy gym. Ambulation 2  Surface - 2: level tile  Device 2: Rolling Walker  Other Apparatus 2: AFO; Right (back brace)  Assistance 2: Stand by assistance  Quality of Gait 2: Same as above. Gait Deviations: Increased YASMANI; Decreased step length;Decreased step height  Distance: 200ft  Comments: 171ft-2MWT (goal met)     Stairs/Curb  Stairs?: Yes  Stairs  # Steps : 10  Stairs Height: 4\" (and 6\" )  Rails: Left ascending  Curbs: 6\" (completed 3 times using door frame to pull on.)  Assistance: Contact guard assistance  Comment: Pt demonstrates good step to sequencing ascending/decending steps using only L HR to simulate home set up. Reports only having LUE support at home when entering front of house. Pt also completed 6\" curb(threshold) step using door frame to hold/pull on. Again this simulated the step she has to do to get into doorway.  Able to complete with CGA        BALANCE Posture: Fair  Sitting - Static: Good (EOB, no back or UE support)  Sitting - Dynamic: Good;- (EOB, no back or UE support)  Standing - Static: Good (with rolling walker)  Standing - Dynamic: Good;- (rolling walker)     Activity Tolerance: Patient tolerated treatment well  PT Equipment Recommendations  Other: Pt has a borrowed rolling walker    Current Treatment Recommendations: Strengthening,Balance training,Functional mobility training,Transfer training,Endurance training,Gait training,Stair training,Safety education & training,Patient/Caregiver education & training,Equipment evaluation, education, & procurement    Conditions Requiring Skilled Therapeutic Intervention  Assessment: Presents with B LE weakness, R LE > L LE, also history of polio with R side weakness and R foot drop. Pt requires min A for mobility at this time with rolling walker. Needs skilled physical therapy to progress to PLOF. Treatment Diagnosis: Impaired mobility  Therapy Prognosis: Good  Decision Making: Medium Complexity  History: Hx of polio, R LE shorter than L LE  Discharge Recommendations: Home with assist PRN;Patient would benefit from continued therapy after discharge    Goals  Short Term Goals  Time Frame for Short term goals: 1 week  Short term goal 1: Pt able to roll in bed maintaingn Back precautions independently  Short term goal 2: Pt able to perfrom supien <>sit SBA  Short term goal 3: Pt ableto transfers SBA wiht rolling walker  Short term goal 4:  Pt able to ambulate 150 ft with rolling walker distance of 150 ft, SBA  Short term goal 5: Pt able to go up and down 5 steps with 2 UE support, min A  Long Term Goals  Time Frame for Long term goals : By DC  Long term goal 1: Pt able to perform supine<>sit mod-I  Long term goal 2: Pt able to transfer mod-I from various surfaces with rolling walker  Long term goal 3: Pt able to ambulte with rolling walker on leel as well as outside terraine at mod- I distnace of atleast 200 ft.   Long term goal 4: Pt to improve 2MW distance to 150ft to improve fucntion and gait speed  Long term goal 5: Pt able to go up and down 5 to 12 steps with 1 UE support, SBA       04/28/22 0835 04/28/22 1352   PT Individual Minutes   Time In [address] [facility],[address]   Time Out 0904 1405   Minutes 60 30       Electronically signed by Bettina Marte PTA on 4/28/22 at 4:49 PM EDT

## 2022-04-28 NOTE — PROGRESS NOTES
Physical Medicine & Rehabilitation  Progress Note      Subjective:      66year-old with lumbar stenosis who is s/p L2-5 laminectomy and L4-5 fusion. Patient is noting post-op pain is controlled. She had BM. She denies any new issues with sleep, appetite, bowel, or bladder. She notes lidocaine patch falls off and is not providing significant relief. ROS:  Denies fevers, chills, sweats. No chest pain, palpitations, lightheadedness. Denies coughing, wheezing or shortness of breath. Denies abdominal pain, nausea, diarrhea. No new areas of joint pain. Denies new areas of numbness or weakness. Denies new anxiety or depression issues. No new skin problems. Rehabilitation:   Progressing in therapies. PT:  Restrictions/Precautions: Surgical Protocols,Fall Risk,General Precautions  Implants present? : Metal implants (Back surgery, R ankle surgery)  Other position/activity restrictions: No lifting greater than 1-2 lbs for 14 days (sx 4/18/22)  Required Braces or Orthoses  Spinal: Lumbar Corset (donned when out of bed. )  Right Lower Extremity Brace: Ankle Foot Orthotics (Family brought in; Pt complains shoe doesn't fit well/AFO doesn't stay in shoe (very particular about donning))   Transfers  Sit to Stand: (P) Stand by assistance  Stand to sit: (P) Stand by assistance (cues to reach back and for safety w/RW)  Bed to Chair: Stand by assistance (without device)  Stand Pivot Transfers: Stand by assistance (No device. )  Comment: Rolling walker, unless noted otherwise. Ambulation  Surface: (P) level tile  Device: (P) Rolling Walker  Other Apparatus: (P) AFO,Right (back brace)  Assistance: (P) Stand by assistance  Quality of Gait: (P) fwd flexed posture, cues to correct. Fair but fleeting return. No catching of R foot with amb using AFO this morning.   Gait Deviations: Increased YASMANI,Slow Tracee,Decreased step height,Decreased step length,Decreased head and trunk rotation  Distance: (P) 139ft  Comments: Educated Pt to be aware of skin integrity in areas where AFO can rub/irritate. Transfers  Sit to Stand: (P) Stand by assistance  Stand to sit: (P) Stand by assistance (cues to reach back and for safety w/RW)  Bed to Chair: Stand by assistance (without device)  Stand Pivot Transfers: Stand by assistance (No device. )  Comment: Rolling walker, unless noted otherwise. Ambulation  Surface: (P) level tile  Device: (P) Rolling Walker  Other Apparatus: (P) AFO,Right (back brace)  Assistance: (P) Stand by assistance  Quality of Gait: (P) fwd flexed posture, cues to correct. Fair but fleeting return. No catching of R foot with amb using AFO this morning. Gait Deviations: Increased YASMANI,Slow Tracee,Decreased step height,Decreased step length,Decreased head and trunk rotation  Distance: (P) 139ft  Comments: Educated Pt to be aware of skin integrity in areas where AFO can rub/irritate. Surface: (P) level tile  Ambulation  Surface: (P) level tile  Device: (P) Rolling Walker  Other Apparatus: (P) AFO,Right (back brace)  Assistance: (P) Stand by assistance  Quality of Gait: (P) fwd flexed posture, cues to correct. Fair but fleeting return. No catching of R foot with amb using AFO this morning. Gait Deviations: Increased YASMANI,Slow Tracee,Decreased step height,Decreased step length,Decreased head and trunk rotation  Distance: (P) 139ft  Comments: Educated Pt to be aware of skin integrity in areas where AFO can rub/irritate. OT:  ADL  Feeding: Modified independent   Feeding Skilled Clinical Factors: per pt report with increased time  Grooming: Stand by assistance  Grooming Skilled Clinical Factors: sitting EOB  UE Bathing: Stand by assistance  UE Bathing Skilled Clinical Factors: Sitting on tub bench with verbal cues for back precautions  LE Bathing: Moderate assistance  LE Bathing Skilled Clinical Factors: A with bilateral lower legs/feet and bottom  UE Dressing: Moderate assistance  UE Dressing Skilled Clinical Factors:  A with doffing/donning back brace  LE Dressing: Maximum assistance  LE Dressing Skilled Clinical Factors: A with doffing/donning bilateral socks and A with threading LLE with increased time and verbal cues for back precautions  Toileting: Minimal assistance  Toileting Skilled Clinical Factors: A for bottom hygiene  Additional Comments: OT facilitated pts engagement in full shower on this date with use of tub bench, grab bars, and hand held shower head. Lunbar corset doffed while sitting on tub bench. Pt required A with all lower body self care tasks at this time. Pt currently limited due to decerased strength, balance, activity tolerance, nausea, and pain limiting safety and independence with self care tasls. PM: OT introduced patient to AE (reacher, sock aid, and long handle sponge) to be used during self care tasks during next session. Instrumental ADL's  Instrumental ADLs: Yes     Balance  Sitting Balance: Stand by assistance  Standing Balance: Minimal assistance (Min A - CGA)   Standing Balance  Time: 1-2 minutes x 4  Activity: Functional transfers/mobility, self care tasks  Comment: Verbal cues for hand placement and safety  Functional Mobility  Functional - Mobility Device: Rolling Walker  Activity: To/from bathroom  Assist Level: Minimal assistance (min A - CGA)     Bed mobility  Rolling to Left: Minimal assistance  Rolling to Right: Minimal assistance  Supine to Sit: Minimal assistance,Contact guard assistance  Sit to Supine: Stand by assistance  Scooting: Stand by assistance (hips laterally on bed)  Comment: HOB ~45º, 2 pillows, did not use rail.    Transfers  Sit to stand: Minimal assistance (min A -CGA)  Stand to sit: Contact guard assistance  Transfer Comments: Verbal cues for hand placement and safety   Toilet Transfers  Toilet - Technique: Ambulating  Equipment Used: Raised toilet seat with rails  Toilet Transfer: Minimal assistance  Toilet Transfers Comments: Verbal cues for hand placement and safety     Shower Transfers  Shower - Transfer From: Clover Dessert - Transfer Type: To and From  Shower - Transfer To: Transfer tub bench  Shower - Technique: Ambulating  Shower Transfers: Minimal assistance  Shower Transfers Comments: Verbal cues for safety over threshold       SPEECH:      Objective:  /76   Pulse 79   Temp 98 °F (36.7 °C)   Resp 19   Ht 5' 4\" (1.626 m)   Wt 180 lb 3.2 oz (81.7 kg)   SpO2 97%   BMI 30.93 kg/m²       GEN: well developed, well nourished, NAD  HEENT: NCAT, PERRL, EOMI, mucous membranes pink and moist  CV: RRR, no murmurs, rubs or gallops  PULM: CTAB, no rales or rhonchi. Respirations WNL and unlabored  ABD: soft, NT, ND, BS+ and equal  NEURO: A&O x3. Sensation intact to light touch. MSK: Functional ROM BUEs, some weakness impairing AROM BLEs . Strength 5/5 key muscles BUEs,5/5 key muscles BLEs. EXTREMITIES: No calf tenderness to palpation bilaterally. No edema BLEs  SKIN: warm dry and intact with good turgor. Midline low back incision well approximated with no erythema induration or drainage  PSYCH: appropriately interactive. Affect WNL. Diagnostics:     CBC:   No results for input(s): WBC, RBC, HGB, HCT, MCV, RDW, PLT in the last 72 hours. BMP:   No results for input(s): NA, K, CL, CO2, PHOS, BUN, CREATININE, CA, GLUCOSE in the last 72 hours. BNP: No results for input(s): BNP in the last 72 hours. PT/INR: No results for input(s): PROTIME, INR in the last 72 hours. APTT: No results for input(s): APTT in the last 72 hours. CARDIAC ENZYMES: No results for input(s): CKMB, CKMBINDEX, TROPONINT in the last 72 hours. Invalid input(s): CKTOTAL;3 troponins   FASTING LIPID PANEL:  Lab Results   Component Value Date    CHOL 223 (H) 07/19/2016    HDL 38 (L) 07/17/2017    TRIG 314 (H) 07/19/2016     LIVER PROFILE: No results for input(s): AST, ALT, ALB, BILIDIR, BILITOT, ALKPHOS in the last 72 hours.      Current Medications:   Current Facility-Administered Medications: albuterol sulfate  (90 Base) MCG/ACT inhaler 2 puff, 2 puff, Inhalation, Q6H PRN  latanoprost (XALATAN) 0.005 % ophthalmic solution 1 drop, 1 drop, Both Eyes, Nightly  ondansetron (ZOFRAN) tablet 4 mg, 4 mg, Oral, Q8H PRN  acetaminophen (TYLENOL) tablet 650 mg, 650 mg, Oral, Q4H PRN  polyethylene glycol (GLYCOLAX) packet 17 g, 17 g, Oral, Daily  senna (SENOKOT) tablet 17.2 mg, 2 tablet, Oral, Daily PRN  bisacodyl (DULCOLAX) suppository 10 mg, 10 mg, Rectal, Daily PRN  amLODIPine (NORVASC) tablet 10 mg, 10 mg, Oral, Daily  dorzolamide-timolol (COSOPT) 22.3-6.8 MG/ML ophthalmic solution 1 drop, 1 drop, Both Eyes, BID  montelukast (SINGULAIR) tablet 10 mg, 10 mg, Oral, Daily  mupirocin (BACTROBAN) 2 % ointment, , Topical, BID  rosuvastatin (CRESTOR) tablet 5 mg, 5 mg, Oral, Nightly  tiZANidine (ZANAFLEX) tablet 4 mg, 4 mg, Oral, Q6H PRN  traMADol (ULTRAM) tablet 50 mg, 50 mg, Oral, Q6H PRN  vitamin D (ERGOCALCIFEROL) capsule 50,000 Units, 50,000 Units, Oral, Weekly  heparin (porcine) injection 5,000 Units, 5,000 Units, SubCUTAneous, 3 times per day  lidocaine 4 % external patch 2 patch, 2 patch, TransDERmal, Daily  pantoprazole (PROTONIX) tablet 40 mg, 40 mg, Oral, QAM AC      Impression/Plan:   Impaired ADLs, gait, and mobility due to:      1. Debility after lumbar laminectomy and fusion for stenosis: Performed by Dr. Hammad Sawant 4/18/22. PT/OT  for gait, mobility, strengthening, endurance, ADLs, and self care. Has Ultram, Tylenol, Zanaflex prn, Lidocaine patch. 2. Acute Respiratory Failure: Post-op. Improved. Has albuterol and Singulair - refusing per respiratory therapy  3. Hx polio with R foot drop: history of R heel and leg lengthening  4. HTN/HLD: on amlodipine, crestor  5. Nausea/vomiting: started on pantoprazole per IM. 6. Vitamin D deficiency: on weekly repletion  7. Glaucoma: Cosopt - may use home supply of preservative free, latanoprost nightly  8.  Bowel Management: Miralax daily, Senokot prn, Dulcolax prn - recommend suppository today  9. Internal medicine for medical management  10. DVT prophylaxis:  On heparin, SCDs and NATHAN stockings      Electronically signed by Divya Nichols MD on 4/28/2022 at 8:52 AM      This note is created with the assistance of a speech recognition program.  While intending to generate a document that actually reflects the content of the visit, the document can still have some errors including those of syntax and sound a like substitutions which may escape proof reading. In such instances, actual meaning can be extrapolated by contextual diversion.

## 2022-04-28 NOTE — PLAN OF CARE
Problem: Discharge Planning  Goal: Discharge to home or other facility with appropriate resources  4/28/2022 1740 by Rohini Paige LPN  Outcome: Progressing     Problem: Safety - Adult  Goal: Free from fall injury  4/28/2022 1740 by Rohini Paige LPN  Outcome: Progressing     Problem: ABCDS Injury Assessment  Goal: Absence of physical injury  4/28/2022 1740 by Rohini Paige LPN  Outcome: Progressing     Problem: Pain  Goal: Verbalizes/displays adequate comfort level or baseline comfort level  4/28/2022 1740 by Rohini Paige LPN  Outcome: Progressing     Problem: Skin/Tissue Integrity  Goal: Absence of new skin breakdown  Description: 1. Monitor for areas of redness and/or skin breakdown  2. Assess vascular access sites hourly  3. Every 4-6 hours minimum:  Change oxygen saturation probe site  4. Every 4-6 hours:  If on nasal continuous positive airway pressure, respiratory therapy assess nares and determine need for appliance change or resting period.   Outcome: Progressing

## 2022-04-28 NOTE — PLAN OF CARE
Problem: Discharge Planning  Goal: Discharge to home or other facility with appropriate resources  Outcome: Progressing     Problem: Safety - Adult  Goal: Free from fall injury  Outcome: Progressing     Problem: ABCDS Injury Assessment  Goal: Absence of physical injury  Outcome: Progressing     Problem: Pain  Goal: Verbalizes/displays adequate comfort level or baseline comfort level  Outcome: Progressing

## 2022-04-28 NOTE — PROGRESS NOTES
Occupational Therapy  Facility/Department: [facility] ACUTE REHAB  Rehabilitation Occupational Therapy Daily Treatment Note    Date: 22  Patient Name: Ana Laura Granger       Room: 6841/1506-66  MRN: [id number]  Account: [de-identified]   : 1950  (72 y.o.) Gender: female                    Past Medical History:  has a past medical history of Arthritis, Chronic back pain, GERD (gastroesophageal reflux disease), Hyperlipidemia, Hypertension, MVP (mitral valve prolapse), and Polio. Past Surgical History:   has a past surgical history that includes Hysterectomy; Hemorrhoid surgery (); Leg Surgery; back surgery; Colonoscopy; Breast surgery; and Tonsillectomy. Restrictions  Restrictions/Precautions: Surgical Protocols; Fall Risk;General Precautions  Implants present? : Metal implants (Back surgery, R ankle surgery)  Other position/activity restrictions: No lifting greater than 1-2 lbs for 14 days (sx 22)  Required Braces or Orthoses?: Yes    Subjective                   Objective               ADL        Functional Mobility  Device: Rolling walker  Activity: To/From therapy gym  Assistance Level: Stand by assist  Skilled Clinical Factors: amb approx 250 feet from therapy to room  Supine to Sit  Skilled Clinical Factors: mod indep sit to supine  Transfers  Surface: To bed  Device: Walker  Sit to Stand  Assistance Level: Stand by assist  Stand to Sit  Assistance Level: Stand by assist   OT Exercises  A/AROM Exercises: BUE elbow flex, ext with 1.5 lb weight, L shld dania some flex, abd (to 90) with weight, unable on R due to shld soft tissue injury post fall in past.  Dynamic Standing Balance Exercises: dania 5-6 min x 2- SBA  Disease-specific Exercises: practice semi cross each LE to bring foot to 4 inches below knee for LE self care- practiced while placing rings over feet, then during adls sitting EOB to doff socks, shoes for prep to supine,  assist to hold LE provided to prevent back stress or twisting. Assessment  Assessment  Activity Tolerance: Patient tolerated treatment well  Discharge Recommendations: Home with assist PRN;Patient would benefit from continued therapy after discharge       Patient Education  Education  Education Provided: IADL Function  Education Provided Comments: pt observed demo of transport item ie. plate of food while amb with RW, pt notes planning to have assist at first and goal to get off RW soon. Plan   continue POC    Goals  Patient Goals   Patient goals : Eloina Collier now just get back to normal and hope that my pain is gone from my back. \"  Short Term Goals  Time Frame for Short term goals: By 1 week  Short Term Goal 1: Pt will complete lower body dressing/bathing with SBA and Good safety while maintaining back precautions  Short Term Goal 2: Pt will complete functional transfers/mobility during self care tasks with SBA and Good safety  Short Term Goal 3: Pt will complete upper body dressing with SBA and Good safety and compliance of back precautions  Short Term Goal 4: Pt will tolerate standing 5+ minutes during functional activity of choice with Good safety  Short Term Goal 5: Pt will verbalize/demonstrate good understanding AE/adaptive strategies/DME to assist in maintaining back precautions to increase safety and independence with self care and mobility  Short Term Goal 6: Pt will participate in 30+ minutes of theraputic exercises/functional activites to increase safety and independence with self care and mobility  Long Term Goals  Time Frame for Long term goals : By discharge  Long Term Goal 1: Pt will complete BADLs with modified independence and Good safety while maintaining back precautions  Long Term Goal 2: Pt will complete functional transfers/mobility during self care tasks with modified independence with Good safety  Long Term Goal 3: Pt will tolerate standing 10+ minutes during functional activity of choice with Good safety  Long Term Goal 4: Pt will complete simple meal prep/light house keeping task with Supervision and Good safety  Long Term Goal 5: Pt will verbalize/demonstrate Good understanding of home safety/fall prevention strategies to increase safety and independence with self care and mobility  Long Term Goal 6: Pt will demonstrate increased  strength during activities of daily living in R hand as evident by 5# improved  strength    Therapy Time   Individual Concurrent Group Co-treatment   Time In 1407         Time Out 1438         Minutes [address], [place]

## 2022-04-28 NOTE — PROGRESS NOTES
Pt has been having multiple small bowel movements throughout shift, but has been unable to have a complete bm. She has refused to take suppository 3 times throughout the day, stated \"Not today, I'm passing gas so I'm going to keep trying\". However, she had Miralax in prune juice at breakfast and said she would prefer her Miralax that way in the future, and will cont to try that to promote a complete bm.

## 2022-04-28 NOTE — PROGRESS NOTES
Occupational Therapy  Facility/Department: [facility] ACUTE REHAB  Rehabilitation Occupational Therapy Daily Treatment Note    Date: 22  Patient Name: Jaimie Augustin       Room: 3752/7939-63  MRN: [id number]  Account: [de-identified]   : 1950  (72 y.o.) Gender: female                    Past Medical History:  has a past medical history of Arthritis, Chronic back pain, GERD (gastroesophageal reflux disease), Hyperlipidemia, Hypertension, MVP (mitral valve prolapse), and Polio. Past Surgical History:   has a past surgical history that includes Hysterectomy; Hemorrhoid surgery (); Leg Surgery; back surgery; Colonoscopy; Breast surgery; and Tonsillectomy. Restrictions  Restrictions/Precautions: Surgical Protocols; Fall Risk;General Precautions  Implants present? : Metal implants (Back surgery, R ankle surgery)  Other position/activity restrictions: No lifting greater than 1-2 lbs for 14 days (sx 22)  Required Braces or Orthoses  Spinal: Lumbar Corset (donned when out of bed.)  Required Braces or Orthoses?: Yes    Subjective  Subjective: \"I had a bad night\" Pt reports not sleeping well last night. Restrictions/Precautions: Surgical Protocols; Fall Risk;General Precautions        Pain Assessment  Pain Assessment: None - Denies Pain     Required Braces or Orthoses   Spinal Lumbar Corset  (donned when out of bed.)     Objective     Cognition  Overall Cognitive Status: WFL  Orientation  Overall Orientation Status: Within Functional Limits         ADL  Feeding  Assistance Level: Modified independent  Skilled Clinical Factors: increased time, per pt report  Grooming/Oral Hygiene  Assistance Level: Modified independent  Upper Extremity Bathing  Equipment Provided: Long-handled sponge  Assistance Level: Set-up  Skilled Clinical Factors: Seated on TTB in [facility]; [address] [place] [address] for back  Lower Extremity Bathing  Equipment Provided: Long-handled sponge  Assistance Level: Set-up  Skilled Clinical Factors: Seated on TTB in shower, [address], weight-shifting for ramón-care/buttocks. LHS for feet  Upper Extremity Dressing  Assistance Level: Supervision  Skilled Clinical Factors: OH shirt and corset brace  Lower Extremity Dressing  Equipment Provided: Reachers  Assistance Level: Stand by assist  Skilled Clinical Factors: Utilized reacher to doff pants/underwear at foot level; SBA for safety in standing  Putting On/Taking Off Footwear  Assistance Level: Minimal assistance  Skilled Clinical Factors: TA for TEDs; don/doff slippers with set-up A only  Tub/Shower Transfers  Type: Shower  Transfer From: Rolling walker  Transfer To: Tub transfer bench  Additional Factors: Set-up  Assistance Level: Stand by assist     Functional Mobility  Device: Rolling walker  Activity: To/From bathroom  Assistance Level: Stand by assist  Skilled Clinical Factors: pt tolerated well this date   Supine to Sit  Assistance Level: Supervision  Sit to Stand  Assistance Level: Stand by assist  Skilled Clinical Factors: Cued for hand placement for safety  Stand to Sit  Assistance Level: Stand by assist  Skilled Clinical Factors: Cued for hand placement for safety   OT Exercises  Exercise Treatment: NEAL facilitated pts activity tollerance performing balloon tapping activity seated. Completed 2 sets ranging from 4 - 5 min. Requiring ocassional rest breaks   Resistive Exercises: NEAL facilitated pt in use of hand gripper for facilitation of increased  strength for ease with ADLs, 2nd setting X15 reps.       Assessment  Assessment  Activity Tolerance: Patient tolerated treatment well  Discharge Recommendations: Home with assist PRN;Patient would benefit from continued therapy after discharge  Safety Devices  Safety Devices in place: Yes  Type of devices: Call light within reach (Pt left on toilet, verbalizes she will call for RN when ready)    Patient Education  Education  Education Given To: Patient  Education Provided: IADL Function  Education Provided Comments: pt observed demo of transport item ie. plate of food while amb with RW, pt notes planning to have assist at first and goal to get off RW soon. Education Method: Verbal  Barriers to Learning: None  Education Outcome: Verbalized understanding;Demonstrated understanding    Plan  Plan  Times per Week: 5-7  Times per Day: Twice a day  Current Treatment Recommendations: Self-Care / ADL; Strengthening;ROM;Balance training;Functional mobility training; Endurance training;Pain management; Safety education & training;Patient/Caregiver education & training;Equipment evaluation, education, & procurement;Home management training    Goals  Patient Goals   Patient goals : Kavin Cheng now just get back to normal and hope that my pain is gone from my back. \"  Short Term Goals  Time Frame for Short term goals: By 1 week  Short Term Goal 1: Pt will complete lower body dressing/bathing with SBA and Good safety while maintaining back precautions  Short Term Goal 2: Pt will complete functional transfers/mobility during self care tasks with SBA and Good safety  Short Term Goal 3: Pt will complete upper body dressing with SBA and Good safety and compliance of back precautions  Short Term Goal 4: Pt will tolerate standing 5+ minutes during functional activity of choice with Good safety  Short Term Goal 5: Pt will verbalize/demonstrate good understanding AE/adaptive strategies/DME to assist in maintaining back precautions to increase safety and independence with self care and mobility  Short Term Goal 6: Pt will participate in 30+ minutes of theraputic exercises/functional activites to increase safety and independence with self care and mobility  Long Term Goals  Time Frame for Long term goals : By discharge  Long Term Goal 1: Pt will complete BADLs with modified independence and Good safety while maintaining back precautions  Long Term Goal 2: Pt will complete functional transfers/mobility during self care tasks with modified independence with Good safety  Long Term Goal 3: Pt will tolerate standing 10+ minutes during functional activity of choice with Good safety  Long Term Goal 4: Pt will complete simple meal prep/light house keeping task with Supervision and Good safety  Long Term Goal 5: Pt will verbalize/demonstrate Good understanding of home safety/fall prevention strategies to increase safety and independence with self care and mobility  Long Term Goal 6: Pt will demonstrate increased  strength during activities of daily living in R hand as evident by 5# improved  strength          04/28/22 1249   OT Individual Minutes   Time In 0959   Time Out 1059   Minutes 60     ANKUR Au

## 2022-04-29 ENCOUNTER — APPOINTMENT (OUTPATIENT)
Dept: GENERAL RADIOLOGY | Age: 72
DRG: 947 | End: 2022-04-29
Attending: PHYSICAL MEDICINE & REHABILITATION
Payer: MEDICARE

## 2022-04-29 PROCEDURE — 6360000002 HC RX W HCPCS: Performed by: PHYSICAL MEDICINE & REHABILITATION

## 2022-04-29 PROCEDURE — 1180000000 HC REHAB R&B

## 2022-04-29 PROCEDURE — 97530 THERAPEUTIC ACTIVITIES: CPT

## 2022-04-29 PROCEDURE — 6370000000 HC RX 637 (ALT 250 FOR IP): Performed by: PHYSICAL MEDICINE & REHABILITATION

## 2022-04-29 PROCEDURE — 71045 X-RAY EXAM CHEST 1 VIEW: CPT

## 2022-04-29 PROCEDURE — 99232 SBSQ HOSP IP/OBS MODERATE 35: CPT | Performed by: INTERNAL MEDICINE

## 2022-04-29 PROCEDURE — 97116 GAIT TRAINING THERAPY: CPT

## 2022-04-29 PROCEDURE — 99232 SBSQ HOSP IP/OBS MODERATE 35: CPT | Performed by: PHYSICAL MEDICINE & REHABILITATION

## 2022-04-29 PROCEDURE — 97110 THERAPEUTIC EXERCISES: CPT

## 2022-04-29 PROCEDURE — 97535 SELF CARE MNGMENT TRAINING: CPT

## 2022-04-29 RX ORDER — FLUTICASONE PROPIONATE 50 MCG
2 SPRAY, SUSPENSION (ML) NASAL DAILY
Status: DISCONTINUED | OUTPATIENT
Start: 2022-04-29 | End: 2022-05-04 | Stop reason: HOSPADM

## 2022-04-29 RX ADMIN — POLYETHYLENE GLYCOL 3350 17 G: 17 POWDER, FOR SOLUTION ORAL at 08:39

## 2022-04-29 RX ADMIN — LATANOPROST 1 DROP: 50 SOLUTION OPHTHALMIC at 21:32

## 2022-04-29 RX ADMIN — HEPARIN SODIUM 5000 UNITS: 5000 INJECTION INTRAVENOUS; SUBCUTANEOUS at 05:40

## 2022-04-29 RX ADMIN — ACETAMINOPHEN 650 MG: 325 TABLET ORAL at 14:47

## 2022-04-29 RX ADMIN — TIZANIDINE 4 MG: 4 TABLET ORAL at 02:06

## 2022-04-29 RX ADMIN — ROSUVASTATIN CALCIUM 5 MG: 10 TABLET, FILM COATED ORAL at 21:31

## 2022-04-29 RX ADMIN — FLUTICASONE PROPIONATE 2 SPRAY: 50 SPRAY, METERED NASAL at 14:33

## 2022-04-29 RX ADMIN — AMLODIPINE BESYLATE 10 MG: 10 TABLET ORAL at 08:31

## 2022-04-29 RX ADMIN — PANTOPRAZOLE SODIUM 40 MG: 40 TABLET, DELAYED RELEASE ORAL at 05:40

## 2022-04-29 RX ADMIN — ACETAMINOPHEN 650 MG: 325 TABLET ORAL at 02:06

## 2022-04-29 RX ADMIN — DORZOLAMIDE HYDROCHLORIDE AND TIMOLOL MALEATE 1 DROP: 20; 5 SOLUTION/ DROPS OPHTHALMIC at 08:38

## 2022-04-29 RX ADMIN — HEPARIN SODIUM 5000 UNITS: 5000 INJECTION INTRAVENOUS; SUBCUTANEOUS at 14:33

## 2022-04-29 RX ADMIN — HEPARIN SODIUM 5000 UNITS: 5000 INJECTION INTRAVENOUS; SUBCUTANEOUS at 21:32

## 2022-04-29 RX ADMIN — DORZOLAMIDE HYDROCHLORIDE AND TIMOLOL MALEATE 1 DROP: 20; 5 SOLUTION/ DROPS OPHTHALMIC at 21:32

## 2022-04-29 ASSESSMENT — PAIN SCALES - WONG BAKER
WONGBAKER_NUMERICALRESPONSE: 0

## 2022-04-29 ASSESSMENT — PAIN DESCRIPTION - ORIENTATION
ORIENTATION: LOWER;RIGHT;LEFT
ORIENTATION: LOWER
ORIENTATION: LOWER

## 2022-04-29 ASSESSMENT — PAIN SCALES - GENERAL
PAINLEVEL_OUTOF10: 1
PAINLEVEL_OUTOF10: 0
PAINLEVEL_OUTOF10: 5
PAINLEVEL_OUTOF10: 0

## 2022-04-29 ASSESSMENT — PAIN DESCRIPTION - LOCATION
LOCATION: BACK;LEG
LOCATION: BACK;LEG
LOCATION: BACK;BUTTOCKS

## 2022-04-29 ASSESSMENT — PAIN DESCRIPTION - DESCRIPTORS
DESCRIPTORS: ACHING;TIGHTNESS
DESCRIPTORS: ACHING
DESCRIPTORS: ACHING

## 2022-04-29 NOTE — PROGRESS NOTES
Physical Therapy  Facility/Department:  ACUTE REHAB  Rehabilitation Physical Therapy Initial Assessment    NAME: Kurtis Fink  : 1950 (66 y.o.)  MRN: [id number]  CODE STATUS: Full Code  Date of Service: 22    Chart Reviewed: Yes  Referring Practitioner: Dr Leatha Epley    Restrictions:  Restrictions/Precautions: Surgical Protocols; Fall Risk;General Precautions  Required Braces or Orthoses  Spinal: Lumbar Corset (donned when out of bed. )  Right Lower Extremity Brace: Ankle Foot Orthotics (Family brought in; Pt complains shoe doesn't fit well/AFO doesn't stay in shoe (very particular about donning))  Position Activity Restriction  Spinal Precautions: No Bending; No Lifting; No Twisting (strenuous activity  (sx 22))  Other position/activity restrictions: No lifting greater than 1-2 lbs for 14 days (sx 22)     SUBJECTIVE  Subjective: Pt states back pain is better (2/10), but movement aggravates it. OBJECTIVE  Functional Mobility  Transfers  Sit to Stand: Stand by assistance  Stand to sit: Stand by assistance (cues to reach back and for safety w/RW)  Stand Pivot Transfers: Stand by assistance  Comment: Rolling walker  Balance  Posture: Fair  Sitting - Static: Good (EOB, no back or UE support)  Sitting - Dynamic: Good;- (EOB, no back or UE support)  Standing - Static: Good (with rolling walker)  Standing - Dynamic: Good;- (rolling walker)     22 1005   PT Exercises   Resistive Exercises Seated bilateral LEs, 1.5#, green (minimal+) resistance band   Functional Mobility Circuit Training Sit <> stand x7   Standing Open/Closed Kinetic Chain Exercises Bilateral LEs, 12x each, bilateral UE support on parallel bars, SBA. Exercise Equipment NuStep, workload 3, 13 min. Environmental Mobility  Ambulation  Surface: level tile  Device: Rolling Walker  Other Apparatus: AFO; Right (lumbar corset)  Assistance: Stand by assistance  Quality of Gait: fwd flexed posture, cues to correct.  Fair but fleeting return. Gait Deviations: Increased YASMANI; Decreased step length;Decreased step height  Distance: 200' AM & 139' PM  Stairs/Curb  Stairs?: Yes     04/29/22 1005   Stairs   # Steps  10   Stairs Height   (4\" and 6\" )   Rails Left ascending;Bilateral  (Pt used both rails for 5 steps, L rail for remaining 5. )   Assistance Stand by assistance   Comment Step-to sequencing; Pt c/o back pain but no observable difficulty noted. ASSESSMENT  Activity Tolerance  Activity Tolerance: Patient tolerated treatment well    Assessment  Treatment Diagnosis: Impaired mobility  History: Hx of polio, R LE shorter than L LE  Discharge Recommendations: Home with assist PRN;Patient would benefit from continued therapy after discharge    GOALS  Short Term Goals  Time Frame for Short term goals: 1 week  Short term goal 1: Pt able to roll in bed maintaingn Back precautions independently  Short term goal 2: Pt able to perfrom supien <>sit SBA  Short term goal 3: Pt ableto transfers SBA wiht rolling walker  Short term goal 4:  Pt able to ambulate 150 ft with rolling walker distance of 150 ft, SBA  Short term goal 5: Pt able to go up and down 5 steps with 2 UE support, min A  Long Term Goals  Time Frame for Long term goals : By DC  Long term goal 1: Pt able to perform supine<>sit mod-I  Long term goal 2: Pt able to transfer mod-I from various surfaces with rolling walker  Long term goal 3: Pt able to ambulte with rolling walker on leel as well as outside terraine at mod- I distnace of atleast 200 ft. Long term goal 4: Pt to improve 2MW distance to 150ft to improve fucntion and gait speed  Long term goal 5: Pt able to go up and down 5 to 12 steps with 1 UE support, SBA    PLAN OF CARE  Plan  Plan:  minutes of therapy at least 5 out of 7 days a week  Current Treatment Recommendations: Strengthening;Balance training;Functional mobility training;Transfer training; Endurance training;Gait training;Stair training; Safety education & training;Patient/Caregiver education & training;Equipment evaluation, education, & procurement  Safety Devices  Type of Devices: All fall risk precautions in place;Gait belt;Patient at risk for falls;Call light within reach; Left in chair    Therapy Time     04/29/22 1005 04/29/22 1343   PT Individual Minutes   Time In 1747 1457   Time Out 1103 1405   Minutes 60 [address] [place], [place], 04/29/22 at 7:13 PM

## 2022-04-29 NOTE — PLAN OF CARE
Problem: Discharge Planning  Goal: Discharge to home or other facility with appropriate resources  4/29/2022 1525 by Jade Mckeon RN  Outcome: Progressing     Problem: Safety - Adult  Goal: Free from fall injury  4/29/2022 1525 by Jade Mckeon RN  Outcome: Progressing     Problem: ABCDS Injury Assessment  Goal: Absence of physical injury  4/29/2022 1525 by Jade Mckeon RN  Outcome: Progressing     Problem: Pain  Goal: Verbalizes/displays adequate comfort level or baseline comfort level  4/29/2022 1525 by Jade Mckeon RN  Outcome: Progressing     Problem: Skin/Tissue Integrity  Goal: Absence of new skin breakdown  Description: 1. Monitor for areas of redness and/or skin breakdown  2. Assess vascular access sites hourly  3. Every 4-6 hours minimum:  Change oxygen saturation probe site  4. Every 4-6 hours:  If on nasal continuous positive airway pressure, respiratory therapy assess nares and determine need for appliance change or resting period.   Outcome: Progressing

## 2022-04-29 NOTE — PLAN OF CARE
Problem: Discharge Planning  Goal: Discharge to home or other facility with appropriate resources  4/29/2022 0440 by Saul Zaragoza RN  Outcome: Progressing     Problem: Safety - Adult  Goal: Free from fall injury  4/29/2022 0440 by Saul Zaragoza RN  Outcome: Progressing     Problem: ABCDS Injury Assessment  Goal: Absence of physical injury  4/29/2022 0440 by Saul Zaragoza RN  Outcome: Progressing     Problem: Pain  Goal: Verbalizes/displays adequate comfort level or baseline comfort level  4/29/2022 0440 by Saul Zaragoza RN  Outcome: Progressing

## 2022-04-29 NOTE — FLOWSHEET NOTE
04/29/22 1241   Encounter Summary   Encounter Overview/Reason  Volunteer Encounter   Service Provided For: Patient   Referral/Consult From: Gene   Last Encounter  04/29/22  (V)   Complexity of Encounter Low   Spiritual/Emotional needs   Type Spiritual Support   Rituals, Rites and Sacraments   Type Faith Communion   Assessment/Intervention/Outcome   Intervention Prayer (assurance of)/[place]

## 2022-04-29 NOTE — PROGRESS NOTES
Occupational Therapy  Facility/Department: [facility] ACUTE REHAB  Rehabilitation Occupational Therapy Daily Treatment Note    Date: 22  Patient Name: Karine Arellano       Room: 4643/6908-15  MRN: [id number]  Account: [de-identified]   : 1950  (72 y.o.) Gender: female                    Past Medical History:  has a past medical history of Arthritis, Chronic back pain, GERD (gastroesophageal reflux disease), Hyperlipidemia, Hypertension, MVP (mitral valve prolapse), and Polio. Past Surgical History:   has a past surgical history that includes Hysterectomy; Hemorrhoid surgery (); Leg Surgery; back surgery; Colonoscopy; Breast surgery; and Tonsillectomy. Restrictions  Restrictions/Precautions: Surgical Protocols; Fall Risk;General Precautions  Implants present? : Metal implants (Back surgery, R ankle surgery)  Other position/activity restrictions: No lifting greater than 1-2 lbs for 14 days (sx 22)  Required Braces or Orthoses  Spinal: Lumbar Corset (donned when out of bed )  Right Lower Extremity Brace: Ankle Foot Orthotics (Family brought in; Pt complains shoe doesn't fit well/AFO do)  Required Braces or Orthoses?: Yes    Subjective  Subjective: \"I finally had a good bowel movment\"  Pain Level: 1  Pain Location: Back;Buttocks  Pain Orientation: Lower  Restrictions/Precautions: Surgical Protocols; Fall Risk;General Precautions        Pain Assessment  Pain Assessment: 0-10  Pain Level: 1  Pain Location: Back,Buttocks  Pain Orientation: Lower  Pain Descriptors: Aching    Objective     Cognition  Overall Cognitive Status: WFL  Orientation  Overall Orientation Status: Within Functional Limits         ADL  Feeding  Assistance Level: Modified independent  Skilled Clinical Factors: increased time, per pt report  Grooming/Oral Hygiene  Assistance Level: Supervision  Skilled Clinical Factors: standing at sink  Upper Extremity Bathing  Equipment Provided: Long-handled sponge  Assistance Level: Set-up  Skilled Clinical Factors: VC to complete seated on bench  Lower Extremity Bathing  Equipment Provided: Long-handled sponge  Assistance Level: Set-up  Skilled Clinical Factors: VC to complete seated on bench  Upper Extremity Dressing  Assistance Level: Supervision  Skilled Clinical Factors: [place] shirt and corset brace  Lower Extremity Dressing  Assistance Level: Stand by assist  Skilled Clinical Factors: SBA for safety in standing. VC to have UE support available when standing to pull pants over hips. Putting On/Taking Off Footwear  Assistance Level: Minimal assistance  Skilled Clinical Factors: TA for TEDs; don/doff slippers with set-up A only  Toileting  Assistance Level: Supervision  Toilet Transfers  Equipment: Raised toilet seat with arms  Assistance Level: Supervision  Skilled Clinical Factors: Pt completing toilet tranfser upon writer arrival with no assist. Serge Iyer provided education on waiting for assist for optimal safety. Tub/Shower Transfers  Type: Shower  Transfer From: Rolling walker  Transfer To: Tub transfer bench  Additional Factors: Set-up  Assistance Level: Stand by assist  Skilled Clinical Factors: steady. No LOB. Pt education provided on safety and precuations recommending a shower chair for use at home in order to best maintain back precuations and only remove brace while seated for hygiene. Functional Mobility  Device: Rolling walker  Activity: Retrieve items;Transport items; To/From bathroom  Assistance Level: Supervision  Skilled Clinical Factors: Pt ambulating to bathroom upon writer arrival without back brace donned. Reports she had to go quickly due to loose stool. NEAL provided education on back precautions and importance of wearing back brace when out of bed. Pt verbalizes good understanding.    Sit to Stand  Assistance Level: Supervision  Skilled Clinical Factors: Cued for hand placement for safety  Stand to Sit  Assistance Level: Supervision  Skilled Clinical Factors: Cued for hand placement for safety   OT Exercises  Resistive Exercises: NEAL facilitated pt in use of hand gripper for facilitation of increased  strength for ease with ADLs, 3# X15 reps each. Dynamic Standing Balance Exercises: Pt instruction in functional reaching at tabletop for facilitation of increased standing tolerance for max I with ADL/ IADLs. Pt tolerates standing for ~5 min and then 3:30. Requires VC for hand placements for optimal safety. Education provided on energy conservation during functional tasks. ; PM: standing tasks at tabletop for 4:23, 1:23 and then 3:23 with focus on reaching to shoulder level using small arch for facilitation of ease with ADL/IADLs. Assessment  Assessment  Activity Tolerance: Patient tolerated treatment well  Discharge Recommendations: Home with assist PRN;Patient would benefit from continued therapy after discharge  OT Equipment Recommendations  Equipment Needed: Yes  Mobility Devices: ADL Assistive Devices  ADL Assistive Devices: Shower Chair with back  [address] [facility] in place: Yes  Type of devices: Left in chair;Call light within reach    Patient Education  Education  Education Given To: Patient  Education Provided: Safety;Precautions; ADL Function;IADL Function; Energy Conservation;Plan of Care  Education Method: Verbal  Barriers to Learning: None  Education Outcome: Verbalized understanding;Demonstrated understanding    Plan  Plan  Times per Week: 5-7  Times per Day: Twice a day  Current Treatment Recommendations: Self-Care / ADL; Strengthening;ROM;Balance training;Functional mobility training; Endurance training;Pain management; Safety education & training;Patient/Caregiver education & training;Equipment evaluation, education, & procurement;Home management training    Goals  Patient Goals   Patient goals : Catherene Andrea now just get back to normal and hope that my pain is gone from my back. \"  Short Term Goals  Time Frame for Short term goals: By 1 week  Short Term Goal 1: Pt will complete lower body dressing/bathing with SBA and Good safety while maintaining back precautions  Short Term Goal 2: Pt will complete functional transfers/mobility during self care tasks with SBA and Good safety  Short Term Goal 3: Pt will complete upper body dressing with SBA and Good safety and compliance of back precautions  Short Term Goal 4: Pt will tolerate standing 5+ minutes during functional activity of choice with Good safety  Short Term Goal 5: Pt will verbalize/demonstrate good understanding AE/adaptive strategies/DME to assist in maintaining back precautions to increase safety and independence with self care and mobility  Short Term Goal 6: Pt will participate in 30+ minutes of theraputic exercises/functional activites to increase safety and independence with self care and mobility  Long Term Goals  Time Frame for Long term goals : By discharge  Long Term Goal 1: Pt will complete BADLs with modified independence and Good safety while maintaining back precautions  Long Term Goal 2: Pt will complete functional transfers/mobility during self care tasks with modified independence with Good safety  Long Term Goal 3: Pt will tolerate standing 10+ minutes during functional activity of choice with Good safety  Long Term Goal 4: Pt will complete simple meal prep/light house keeping task with Supervision and Good safety  Long Term Goal 5: Pt will verbalize/demonstrate Good understanding of home safety/fall prevention strategies to increase safety and independence with self care and mobility  Long Term Goal 6: Pt will demonstrate increased  strength during activities of daily living in R hand as evident by 5# improved  strength               04/29/22 1340 04/29/22 1341   OT Individual Minutes   Time In 0804 1302   Time Out 0903 1333   Minutes [address] [place], NEAL/L

## 2022-04-29 NOTE — PROGRESS NOTES
Physical Medicine & Rehabilitation  Progress Note      Subjective:      66year-old with lumbar stenosis who is s/p L2-5 laminectomy and L4-5 fusion. Patient is doing well today regarding back pain and functional mobility, noting gradual improvement with therapies. She does report dry cough and sinus drainage. Eye irritation is improving with preservative free drops. Constipation has resolved. ROS:  Denies fevers, chills, sweats. No chest pain, palpitations, lightheadedness. Denies coughing, wheezing or shortness of breath. Denies abdominal pain, nausea, diarrhea. No new areas of joint pain. Denies new areas of numbness or weakness. Denies new anxiety or depression issues. No new skin problems. Rehabilitation:   Progressing in therapies. PT:  Restrictions/Precautions: Surgical Protocols,Fall Risk,General Precautions  Implants present? : Metal implants (Back surgery, R ankle surgery)  Other position/activity restrictions: No lifting greater than 1-2 lbs for 14 days (sx 4/18/22)  Required Braces or Orthoses  Spinal: Lumbar Corset (donned when out of bed. )  Right Lower Extremity Brace: Ankle Foot Orthotics (Family brought in; Pt complains shoe doesn't fit well/AFO doesn't stay in shoe (very particular about donning))   Transfers  Sit to Stand: Stand by assistance  Stand to sit: Stand by assistance (cues to reach back and for safety w/RW)  Bed to Chair: Stand by assistance (without device)  Stand Pivot Transfers: Stand by assistance  Comment: Rolling walker  Ambulation  Surface: level tile  Device: Rolling Walker  Other Apparatus: AFO,Right (lumbar corset)  Assistance: Stand by assistance  Quality of Gait: fwd flexed posture, cues to correct. Fair but fleeting return. No catching of R foot with amb using AFO this morning. Gait Deviations: Increased YASMANI,Decreased step length,Decreased step height  Distance: 200'  Comments: Pt able to amb from room down to therapy gym.     Transfers  Sit to Stand: Stand by assistance  Stand to sit: Stand by assistance (cues to reach back and for safety w/RW)  Bed to Chair: Stand by assistance (without device)  Stand Pivot Transfers: Stand by assistance  Comment: Rolling walker     Ambulation  Surface: level tile  Device: Rolling Walker  Other Apparatus: AFO,Right (lumbar corset)  Assistance: Stand by assistance  Quality of Gait: fwd flexed posture, cues to correct. Fair but fleeting return. No catching of R foot with amb using AFO this morning. Gait Deviations: Increased YASMANI,Decreased step length,Decreased step height  Distance: 200'  Comments: Pt able to amb from room down to therapy gym. Surface: level tile  Ambulation  Surface: level tile  Device: Rolling Walker  Other Apparatus: AFO,Right (lumbar corset)  Assistance: Stand by assistance  Quality of Gait: fwd flexed posture, cues to correct. Fair but fleeting return. No catching of R foot with amb using AFO this morning. Gait Deviations: Increased YASMANI,Decreased step length,Decreased step height  Distance: 200'  Comments: Pt able to amb from room down to therapy gym. OT:  ADL  Feeding: Modified independent   Feeding Skilled Clinical Factors: per pt report with increased time  Grooming: Stand by assistance  Grooming Skilled Clinical Factors: sitting EOB  UE Bathing: Stand by assistance  UE Bathing Skilled Clinical Factors: Sitting on tub bench with verbal cues for back precautions  LE Bathing: Moderate assistance  LE Bathing Skilled Clinical Factors: A with bilateral lower legs/feet and bottom  UE Dressing:  Moderate assistance  UE Dressing Skilled Clinical Factors: A with doffing/donning back brace  LE Dressing: Maximum assistance  LE Dressing Skilled Clinical Factors: A with doffing/donning bilateral socks and A with threading LLE with increased time and verbal cues for back precautions  Toileting: Minimal assistance  Toileting Skilled Clinical Factors: A for bottom hygiene  Additional Comments: OT facilitated pts engagement in full shower on this date with use of tub bench, grab bars, and hand held shower head. Lunbar corset doffed while sitting on tub bench. Pt required A with all lower body self care tasks at this time. Pt currently limited due to decerased strength, balance, activity tolerance, nausea, and pain limiting safety and independence with self care tasls. PM: OT introduced patient to AE (reacher, sock aid, and long handle sponge) to be used during self care tasks during next session. Instrumental ADL's  Instrumental ADLs: Yes     Balance  Sitting Balance: Stand by assistance  Standing Balance: Minimal assistance (Min A - CGA)   Standing Balance  Time: 1-2 minutes x 4  Activity: Functional transfers/mobility, self care tasks  Comment: Verbal cues for hand placement and safety  Functional Mobility  Functional - Mobility Device: Rolling Walker  Activity: To/from bathroom  Assist Level: Minimal assistance (min A - CGA)     Bed mobility  Rolling to Left: Minimal assistance  Rolling to Right: Minimal assistance  Supine to Sit: Minimal assistance,Contact guard assistance  Sit to Supine: Stand by assistance  Scooting: Modified independent  Comment: HOB ~45º, 2 pillows, did not use rail. Transfers  Sit to stand: Minimal assistance (min A -CGA)  Stand to sit: Contact guard assistance  Transfer Comments: Verbal cues for hand placement and safety   Toilet Transfers  Toilet - Technique: Ambulating  Equipment Used: Raised toilet seat with rails  Toilet Transfer: Minimal assistance  Toilet Transfers Comments: Verbal cues for hand placement and safety     Shower Transfers  Shower - Transfer From: Walker  Shower - Transfer Type: To and From  Shower - Transfer To:  Transfer tub bench  Shower - Technique: Ambulating  Shower Transfers: Minimal assistance  Shower Transfers Comments: Verbal cues for safety over threshold       SPEECH:      Objective:  /79   Pulse 88   Temp 97.7 °F (36.5 °C) (Oral)   Resp 18   Ht 5' 4\" (1.626 m)   Wt 180 lb 3.2 oz (81.7 kg)   SpO2 99%   BMI 30.93 kg/m²       GEN: well developed, well nourished, NAD  HEENT: NCAT, PERRL, EOMI, mucous membranes pink and moist  CV: RRR, no murmurs, rubs or gallops  PULM: CTAB, no rales or rhonchi. Respirations WNL and unlabored  ABD: soft, NT, ND, BS+ and equal  NEURO: A&O x3. Sensation intact to light touch. MSK: Functional ROM BUEs, some weakness impairing AROM BLEs . Strength 5/5 key muscles BUEs,5/5 key muscles BLEs. EXTREMITIES: No calf tenderness to palpation bilaterally. No edema BLEs  SKIN: warm dry and intact with good turgor. Midline low back incision well approximated with no erythema induration or drainage  PSYCH: appropriately interactive. Affect WNL. Diagnostics:     CBC:   No results for input(s): WBC, RBC, HGB, HCT, MCV, RDW, PLT in the last 72 hours. BMP:   No results for input(s): NA, K, CL, CO2, PHOS, BUN, CREATININE, CA, GLUCOSE in the last 72 hours. BNP: No results for input(s): BNP in the last 72 hours. PT/INR: No results for input(s): PROTIME, INR in the last 72 hours. APTT: No results for input(s): APTT in the last 72 hours. CARDIAC ENZYMES: No results for input(s): CKMB, CKMBINDEX, TROPONINT in the last 72 hours. Invalid input(s): CKTOTAL;3 troponins   FASTING LIPID PANEL:  Lab Results   Component Value Date    CHOL 223 (H) 07/19/2016    HDL 38 (L) 07/17/2017    TRIG 314 (H) 07/19/2016     LIVER PROFILE: No results for input(s): AST, ALT, ALB, BILIDIR, BILITOT, ALKPHOS in the last 72 hours.      Current Medications:   Current Facility-Administered Medications: fluticasone (FLONASE) 50 MCG/ACT nasal spray 2 spray, 2 spray, Each Nostril, Daily  albuterol sulfate  (90 Base) MCG/ACT inhaler 2 puff, 2 puff, Inhalation, Q6H PRN  latanoprost (XALATAN) 0.005 % ophthalmic solution 1 drop, 1 drop, Both Eyes, Nightly  ondansetron (ZOFRAN) tablet 4 mg, 4 mg, Oral, Q8H PRN  acetaminophen (TYLENOL) tablet 650 mg, 650 mg, Oral, Q4H PRN  polyethylene glycol (GLYCOLAX) packet 17 g, 17 g, Oral, Daily  senna (SENOKOT) tablet 17.2 mg, 2 tablet, Oral, Daily PRN  bisacodyl (DULCOLAX) suppository 10 mg, 10 mg, Rectal, Daily PRN  amLODIPine (NORVASC) tablet 10 mg, 10 mg, Oral, Daily  dorzolamide-timolol (COSOPT) 22.3-6.8 MG/ML ophthalmic solution 1 drop, 1 drop, Both Eyes, BID  montelukast (SINGULAIR) tablet 10 mg, 10 mg, Oral, Daily  mupirocin (BACTROBAN) 2 % ointment, , Topical, BID  rosuvastatin (CRESTOR) tablet 5 mg, 5 mg, Oral, Nightly  tiZANidine (ZANAFLEX) tablet 4 mg, 4 mg, Oral, Q6H PRN  traMADol (ULTRAM) tablet 50 mg, 50 mg, Oral, Q6H PRN  vitamin D (ERGOCALCIFEROL) capsule 50,000 Units, 50,000 Units, Oral, Weekly  heparin (porcine) injection 5,000 Units, 5,000 Units, SubCUTAneous, 3 times per day  pantoprazole (PROTONIX) tablet 40 mg, 40 mg, Oral, QAM AC      Impression/Plan:   Impaired ADLs, gait, and mobility due to:      1. Debility after lumbar laminectomy and fusion for stenosis: Performed by Dr. Isabelle Miles 4/18/22. PT/OT  for gait, mobility, strengthening, endurance, ADLs, and self care. Has Ultram, Tylenol, Zanaflex prn, Lidocaine patch. 2. Acute Respiratory Failure: Post-op. Improved. Has albuterol and Singulair - refusing per respiratory therapy  3. Allergic rhinitis: will add flonase and monitor  4. Hx polio with R foot drop: history of R heel and leg lengthening  5. HTN/HLD: on amlodipine, crestor  6. Nausea/vomiting: started on pantoprazole per IM. 7. Vitamin D deficiency: on weekly repletion  8. Glaucoma: Cosopt - may use home supply of preservative free, latanoprost nightly  9. Bowel Management: Miralax daily, Senokot prn, Dulcolax prn   10. Internal medicine for medical management  11.  DVT prophylaxis:  On heparin, SCDs and NATHAN stockings      Electronically signed by Rose Rahman MD on 4/29/2022 at 1:26 PM      This note is created with the assistance of a speech recognition program.  While intending to generate a document that actually reflects the content of the visit, the document can still have some errors including those of syntax and sound a like substitutions which may escape proof reading. In such instances, actual meaning can be extrapolated by contextual diversion.

## 2022-04-29 NOTE — PROGRESS NOTES
[facility]    CONSULTATION / HISTORY AND PHYSICAL EXAMINATION            Date:   4/29/2022  Patient name:  Bladimir Hathaway  Date of admission:  4/23/2022 12:09 PM  MRN:   [id number]  Account:  [de-identified]  YOB: 1950  PCP:    Irlanda Bailey MD  Room:   [facility]  Code Status:    Full Code    Physician Requesting Consult: Hamilton Thompson MD    Reason for Consult: Medical management    Chief Complaint:     No chief complaint on file.     Debility    History Obtained From:     patient, electronic medical record    History of Present Illness/ Interval History:     s/p lumbar spine decompression for lumbar spine stenosis  48-year-old female with history of previous back surgeries had progressively worsening low back pain for several years.  Notes worsening with walking.  Weakness to lower extremities with increased activity.  She has tried aqua therapy.  Also history of polio with residual right foot drop.  She uses a walker to assist with ambulation.  She was noted to have spinal stenosis L4-L5 level.  Underwent L2-5 laminectomy and interbody fusion L4-5.  Expandable disc spacer interbody L4-5.  Postop developed hypoxia Placed on 3 L oxygen.  On heparin for DVT prophylaxis    Medical history includes hypertension hyperlipidemia GERD    HTN  Onset more than 2 years ago  Charity: mild to mod  Usually controlled with current po meds  Not associated with headaches or blurry vision  No chest pain      Past Medical History:     Past Medical History:   Diagnosis Date    Arthritis     Chronic back pain     ddd    GERD (gastroesophageal reflux disease)     Hyperlipidemia     Hypertension     MVP (mitral valve prolapse)     Polio     right leg at age 2        Past Surgical History:     Past Surgical History:   Procedure Laterality Date    BACK SURGERY      BREAST SURGERY      COLONOSCOPY      HEMORRHOID SURGERY  1988    HYSTERECTOMY      LEG SURGERY fused right heel and right leg lengthened-due to polio    TONSILLECTOMY          Medications Prior to Admission:     Prior to Admission medications    Medication Sig Start Date End Date Taking? Authorizing Provider   alendronate (FOSAMAX) 70 MG tablet Take 1 tablet by mouth every 7 days 7/19/16   Marshall Bonds MD   hydrocortisone (ANUSOL-HC) 2.5 % rectal cream Place rectally 2 times daily. 5/31/16   Debbie Souza MD   simvastatin (ZOCOR) 20 MG tablet Take 1 tablet by mouth nightly 4/21/16   Marshall Bonds MD   losartan-hydrochlorothiazide [facility]) 100-25 MG per tablet Take 1 tablet by mouth daily 4/21/16   Marshall Bonds MD   clobetasol (TEMOVATE) 0.05 % cream Pt uses twice weekly 9/24/15   Marshall Bonds MD   Fluocinolone Acetonide 0.01 % OIL Place 1 drop in ear(s) nightly 7/7/15   Debbie Souza MD   ibuprofen (ADVIL;MOTRIN) 800 MG tablet Take 800 mg by mouth every 8 hours as needed for Pain. 5/28/14 7/19/16  Debbie Souza MD   timolol (TIMOPTIC-XR) 0.5 % ophthalmic gel-forming Place 1 drop into both eyes daily. 9/9/12   Historical Provider, MD   Multiple Vitamins-Minerals (PRESERVISION/LUTEIN) CAPS Take 1 capsule by mouth daily. Historical Provider, MD   Omeprazole-Sodium Bicarbonate (ZEGERID OTC)  MG CAPS Take  by mouth daily. Historical Provider, MD        Allergies:     Crestor [rosuvastatin], Darvocet-n 100 [propoxyphene n-acetaminophen], Equagesic, Lipitor, and Relafen [nabumetone]    Social History:     Tobacco:    reports that she has never smoked. She has never used smokeless tobacco.  Alcohol:      reports no history of alcohol use. Drug Use:  reports no history of drug use. Family History:     Family History   Problem Relation Age of Onset    Cancer Mother         breast    Heart Disease Father        Review of Systems:     Positive and Negative as described in HPI.     Denies any shortness of breath or cough  Denies chest pain or palpitations  Denies abdominal pain, diarrhea vomiting  Denies any new numbness tremors or weakness. Physical Exam:     /79   Pulse 88   Temp 97.7 °F (36.5 °C) (Oral)   Resp 18   Ht 5' 4\" (1.626 m)   Wt 180 lb 3.2 oz (81.7 kg)   SpO2 99%   BMI 30.93 kg/m²   Temp (24hrs), Av.8 °F (36.6 °C), Min:97.7 °F (36.5 °C), Max:97.9 °F (36.6 °C)      General appearance:  alert, cooperative and no distress  Eyes: Anicteric sclera. Pupils are equally round and reactive to light. Extraocular movements are intact.   Lungs:  clear to auscultation bilaterally, normal effort  Heart:  regular rate and rhythm, no murmur  Abdomen:  soft, nontender, nondistended, normal bowel sounds, no masses, hepatomegaly, splenomegaly  Extremities:  no edema, redness, tenderness in the calves  Skin:  no gross lesions, rashes, induration  Neuro:  Alert, oriented X 3, no new focal weakness    Investigations:      Laboratory Testing:  Lab Results   Component Value Date    WBC 10.6 2022    HGB 11.6 (L) 2022    HCT 34.5 (L) 2022    MCV 90.2 2022     2022     Lab Results   Component Value Date     2022    K 4.2 2022     2022    CO2 26 2022    BUN 25 2022    CREATININE 1.17 2022    GLUCOSE 109 2022    CALCIUM 9.7 2022         Assessment :      Primary Problem  Debility    Active Hospital Problems    Diagnosis Date Noted    Non-intractable vomiting with nausea [R11.2] 2022     Priority: Medium    Status post lumbar spine surgery for decompression of spinal cord [Z98.890] 2022     Priority: Medium    Debility [R53.81] 2022     Priority: Medium    HTN (hypertension) [I10] 2012    Hypertriglyceridemia [E78.1] 2012       Plan:     Principal Problem:    Debility  Active Problems:    Non-intractable vomiting with nausea    Status post lumbar spine surgery for decompression of spinal cord    HTN (hypertension)    Hypertriglyceridemia  Resolved Problems:    * No resolved hospital problems. *        1. Status post lumbar spine surgery  2. Hypertension continue amlodipine, currently controlled  3. Hyperlipidemia-continue statin  4. GERD-continue Protonix    DVT prophylaxis-heparin    4/28  Periorbital redness noted today- pt reports this is due to her eyedrops being ones with preservative; she need preservative free ones which have been ordred now. No vsion change    Patient reports no new complaints. Engaging with physical therapy. Labs and vitals reviewed. No new issues. Continue with current care. 4/29  Periorbital redness better  Still c/o cough  Will get CXR  Has been started on flonase        Consultations:   Teo Hughes MD  4/29/2022  10:57 AM    Copy sent to Dr. Lena Titus MD    Please note that this chart was generated using voice recognition Dragon dictation software. Although every effort was made to ensure the accuracy of this automated transcription, some errors in transcription may have occurred.

## 2022-04-30 PROCEDURE — 99232 SBSQ HOSP IP/OBS MODERATE 35: CPT | Performed by: STUDENT IN AN ORGANIZED HEALTH CARE EDUCATION/TRAINING PROGRAM

## 2022-04-30 PROCEDURE — 97110 THERAPEUTIC EXERCISES: CPT

## 2022-04-30 PROCEDURE — 6370000000 HC RX 637 (ALT 250 FOR IP): Performed by: PHYSICAL MEDICINE & REHABILITATION

## 2022-04-30 PROCEDURE — 99232 SBSQ HOSP IP/OBS MODERATE 35: CPT | Performed by: INTERNAL MEDICINE

## 2022-04-30 PROCEDURE — 6360000002 HC RX W HCPCS: Performed by: PHYSICAL MEDICINE & REHABILITATION

## 2022-04-30 PROCEDURE — 97116 GAIT TRAINING THERAPY: CPT

## 2022-04-30 PROCEDURE — 1180000000 HC REHAB R&B

## 2022-04-30 PROCEDURE — 97530 THERAPEUTIC ACTIVITIES: CPT

## 2022-04-30 RX ADMIN — ACETAMINOPHEN 650 MG: 325 TABLET ORAL at 21:08

## 2022-04-30 RX ADMIN — ROSUVASTATIN CALCIUM 5 MG: 10 TABLET, FILM COATED ORAL at 21:08

## 2022-04-30 RX ADMIN — ACETAMINOPHEN 650 MG: 325 TABLET ORAL at 01:22

## 2022-04-30 RX ADMIN — HEPARIN SODIUM 5000 UNITS: 5000 INJECTION INTRAVENOUS; SUBCUTANEOUS at 05:34

## 2022-04-30 RX ADMIN — HEPARIN SODIUM 5000 UNITS: 5000 INJECTION INTRAVENOUS; SUBCUTANEOUS at 21:12

## 2022-04-30 RX ADMIN — FLUTICASONE PROPIONATE 2 SPRAY: 50 SPRAY, METERED NASAL at 08:20

## 2022-04-30 RX ADMIN — AMLODIPINE BESYLATE 10 MG: 10 TABLET ORAL at 08:19

## 2022-04-30 RX ADMIN — PANTOPRAZOLE SODIUM 40 MG: 40 TABLET, DELAYED RELEASE ORAL at 05:33

## 2022-04-30 RX ADMIN — LATANOPROST 1 DROP: 50 SOLUTION OPHTHALMIC at 21:11

## 2022-04-30 RX ADMIN — HEPARIN SODIUM 5000 UNITS: 5000 INJECTION INTRAVENOUS; SUBCUTANEOUS at 14:42

## 2022-04-30 ASSESSMENT — PAIN SCALES - WONG BAKER

## 2022-04-30 ASSESSMENT — PAIN DESCRIPTION - DESCRIPTORS
DESCRIPTORS: ACHING;BURNING
DESCRIPTORS: ACHING

## 2022-04-30 ASSESSMENT — PAIN DESCRIPTION - ONSET: ONSET: ON-GOING

## 2022-04-30 ASSESSMENT — PAIN DESCRIPTION - LOCATION
LOCATION: BUTTOCKS;HIP
LOCATION: BACK

## 2022-04-30 ASSESSMENT — PAIN SCALES - GENERAL
PAINLEVEL_OUTOF10: 3
PAINLEVEL_OUTOF10: 2
PAINLEVEL_OUTOF10: 4

## 2022-04-30 ASSESSMENT — PAIN DESCRIPTION - FREQUENCY: FREQUENCY: CONTINUOUS

## 2022-04-30 ASSESSMENT — PAIN DESCRIPTION - ORIENTATION
ORIENTATION: LEFT
ORIENTATION: LOWER

## 2022-04-30 ASSESSMENT — PAIN DESCRIPTION - PAIN TYPE: TYPE: CHRONIC PAIN

## 2022-04-30 NOTE — PLAN OF CARE
Problem: Pain  Goal: Verbalizes/displays adequate comfort level or baseline comfort level  Outcome: Progressing  Flowsheets (Taken 4/30/2022 1805)  Verbalizes/displays adequate comfort level or baseline comfort level: Assess pain using appropriate pain scale  Note: Patient denied pain throughout shift. Will continue to assess. Problem: Skin/Tissue Integrity  Goal: Absence of new skin breakdown  Description: 1. Monitor for areas of redness and/or skin breakdown  2. Assess vascular access sites hourly  3. Every 4-6 hours minimum:  Change oxygen saturation probe site  4. Every 4-6 hours:  If on nasal continuous positive airway pressure, respiratory therapy assess nares and determine need for appliance change or resting period. Outcome: Progressing  Note: There are no new skin issues so far this shift. Will continue to assist patient with repositioning at least every two hours.

## 2022-04-30 NOTE — PLAN OF CARE
Problem: Discharge Planning  Goal: Discharge to home or other facility with appropriate resources  4/30/2022 0215 by Jayna Mckeon RN  Outcome: Progressing  4/29/2022 1525 by Danyel Joaquin RN  Outcome: Progressing     Problem: Safety - Adult  Goal: Free from fall injury  4/30/2022 0215 by Jayna Mckeon RN  Outcome: Progressing  4/29/2022 1525 by Danyel Joaquin RN  Outcome: Progressing     Problem: ABCDS Injury Assessment  Goal: Absence of physical injury  4/30/2022 0215 by Jayna Mckeon RN  Outcome: Progressing  4/29/2022 1525 by Danyel Joaquin RN  Outcome: Progressing     Problem: Pain  Goal: Verbalizes/displays adequate comfort level or baseline comfort level  4/30/2022 0215 by Jayna Mckeon RN  Outcome: Progressing  4/29/2022 1525 by Danyel Joaquin RN  Outcome: Progressing     Problem: Skin/Tissue Integrity  Goal: Absence of new skin breakdown  Description: 1. Monitor for areas of redness and/or skin breakdown  2. Assess vascular access sites hourly  3. Every 4-6 hours minimum:  Change oxygen saturation probe site  4. Every 4-6 hours:  If on nasal continuous positive airway pressure, respiratory therapy assess nares and determine need for appliance change or resting period.   4/30/2022 0215 by Jayna Mckeon RN  Outcome: Progressing  4/29/2022 1525 by Danyel Joaquin RN  Outcome: Progressing

## 2022-04-30 NOTE — PROGRESS NOTES
[facility]    CONSULTATION / HISTORY AND PHYSICAL EXAMINATION            Date:   4/30/2022  Patient name:  Jaimie Augustin  Date of admission:  4/23/2022 12:09 PM  MRN:   [id number]  Account:  [de-identified]  YOB: 1950  PCP:    Martha Tim MD  Room:   2606/2606-01  Code Status:    Full Code    Physician Requesting Consult: Brett Mcguire MD    Reason for Consult: Medical management    Chief Complaint:     No chief complaint on file.     Debility    History Obtained From:     patient, electronic medical record    History of Present Illness/ Interval History:     s/p lumbar spine decompression for lumbar spine stenosis  69-year-old female with history of previous back surgeries had progressively worsening low back pain for several years.  Notes worsening with walking.  Weakness to lower extremities with increased activity.  She has tried aqua therapy.  Also history of polio with residual right foot drop.  She uses a walker to assist with ambulation.  She was noted to have spinal stenosis L4-L5 level.  Underwent L2-5 laminectomy and interbody fusion L4-5.  Expandable disc spacer interbody L4-5.  Postop developed hypoxia Placed on 3 L oxygen.  On heparin for DVT prophylaxis    Medical history includes hypertension hyperlipidemia GERD    HTN  Onset more than 2 years ago  Charity: mild to mod  Usually controlled with current po meds  Not associated with headaches or blurry vision  No chest pain      Past Medical History:     Past Medical History:   Diagnosis Date    Arthritis     Chronic back pain     ddd    GERD (gastroesophageal reflux disease)     Hyperlipidemia     Hypertension     MVP (mitral valve prolapse)     Polio     right leg at age 2        Past Surgical History:     Past Surgical History:   Procedure Laterality Date    BACK SURGERY      BREAST SURGERY      COLONOSCOPY      HEMORRHOID SURGERY  1988    HYSTERECTOMY      LEG SURGERY fused right heel and right leg lengthened-due to polio    TONSILLECTOMY          Medications Prior to Admission:     Prior to Admission medications    Medication Sig Start Date End Date Taking? Authorizing Provider   alendronate (FOSAMAX) 70 MG tablet Take 1 tablet by mouth every 7 days 7/19/16   David Beckford MD   hydrocortisone (ANUSOL-HC) 2.5 % rectal cream Place rectally 2 times daily. 5/31/16   Carlos Enrique Arechiga MD   simvastatin (ZOCOR) 20 MG tablet Take 1 tablet by mouth nightly 4/21/16   David Beckford MD   losartan-hydrochlorothiazide [facility]) 100-25 MG per tablet Take 1 tablet by mouth daily 4/21/16   David Beckford MD   clobetasol (TEMOVATE) 0.05 % cream Pt uses twice weekly 9/24/15   David Beckford MD   Fluocinolone Acetonide 0.01 % OIL Place 1 drop in ear(s) nightly 7/7/15   Carlos Enrique Arechiga MD   ibuprofen (ADVIL;MOTRIN) 800 MG tablet Take 800 mg by mouth every 8 hours as needed for Pain. 5/28/14 7/19/16  Carlos Enrique Arechiga MD   timolol (TIMOPTIC-XR) 0.5 % ophthalmic gel-forming Place 1 drop into both eyes daily. 9/9/12   Historical Provider, MD   Multiple Vitamins-Minerals (PRESERVISION/LUTEIN) CAPS Take 1 capsule by mouth daily. Historical Provider, MD   Omeprazole-Sodium Bicarbonate (ZEGERID OTC)  MG CAPS Take  by mouth daily. Historical Provider, MD        Allergies:     Crestor [rosuvastatin], Darvocet-n 100 [propoxyphene n-acetaminophen], Equagesic, Lipitor, and Relafen [nabumetone]    Social History:     Tobacco:    reports that she has never smoked. She has never used smokeless tobacco.  Alcohol:      reports no history of alcohol use. Drug Use:  reports no history of drug use. Family History:     Family History   Problem Relation Age of Onset    Cancer Mother         breast    Heart Disease Father        Review of Systems:     Positive and Negative as described in HPI.     Denies any shortness of breath or cough  Denies chest pain or palpitations  Denies abdominal pain, diarrhea vomiting  Denies any new numbness tremors or weakness. Physical Exam:     BP (!) 150/88   Pulse 80   Temp 98.2 °F (36.8 °C)   Resp 16   Ht 5' 4\" (1.626 m)   Wt 180 lb 3.2 oz (81.7 kg)   SpO2 97%   BMI 30.93 kg/m²   Temp (24hrs), Av.2 °F (36.8 °C), Min:98.1 °F (36.7 °C), Max:98.2 °F (36.8 °C)      General appearance:  alert, cooperative and no distress  Eyes: Anicteric sclera. Pupils are equally round and reactive to light. Extraocular movements are intact.   Lungs:  clear to auscultation bilaterally, normal effort  Heart:  regular rate and rhythm, no murmur  Abdomen:  soft, nontender, nondistended, normal bowel sounds, no masses, hepatomegaly, splenomegaly  Extremities:  no edema, redness, tenderness in the calves  Skin:  no gross lesions, rashes, induration  Neuro:  Alert, oriented X 3, no new focal weakness    Investigations:      Laboratory Testing:  Lab Results   Component Value Date    WBC 10.6 2022    HGB 11.6 (L) 2022    HCT 34.5 (L) 2022    MCV 90.2 2022     2022     Lab Results   Component Value Date     2022    K 4.2 2022     2022    CO2 26 2022    BUN 25 2022    CREATININE 1.17 2022    GLUCOSE 109 2022    CALCIUM 9.7 2022         Assessment :      Primary Problem  Debility    Active Hospital Problems    Diagnosis Date Noted    Non-intractable vomiting with nausea [R11.2] 2022     Priority: Medium    Status post lumbar spine surgery for decompression of spinal cord [Z98.890] 2022     Priority: Medium    Debility [R53.81] 2022     Priority: Medium    HTN (hypertension) [I10] 2012    Hypertriglyceridemia [E78.1] 2012       Plan:     Principal Problem:    Debility  Active Problems:    Non-intractable vomiting with nausea    Status post lumbar spine surgery for decompression of spinal cord    HTN (hypertension)    Hypertriglyceridemia  Resolved Problems:    * No resolved hospital problems. *        1. Status post lumbar spine surgery  2. Hypertension continue amlodipine, currently controlled  3. Hyperlipidemia-continue statin  4. GERD-continue Protonix    Cough ,cxr negative  No flonase  Not on ace or arbs  On albuterol  Will watch for now            Consultations:   [facility] TO SOCIAL WORK  IP CONSULT TO INTERNAL MEDICINE  IP CONSULT TO INTERNAL MEDICINE      Chelle Mason MD  4/30/2022  3:52 PM    Copy sent to Dr. Elva Nicholson MD    Please note that this chart was generated using voice recognition Dragon dictation software. Although every effort was made to ensure the accuracy of this automated transcription, some errors in transcription may have occurred.

## 2022-04-30 NOTE — PROGRESS NOTES
Physical Medicine & Rehabilitation  Progress Note      Subjective:      Liang Arriaza is a 66 y.o. female with lumbar stenosis s/p L2-L5 laminectomy and L4-L5 fusion. She reports having an 'off' or 'bad' day today. She notes back pain and bilateral lower limb pain as well as ongoing numbness in the left foot. She states that she does not want to make any changes to medications at this time. She also reports ongoing cough. She denies any other acute concerns. ROS:  Denies fevers, chills, sweats. No chest pain, palpitations, lightheadedness. +cough; Denies wheezing or shortness of breath. Denies abdominal pain, nausea, diarrhea or constipation. No new areas of joint pain. Denies new areas of numbness or weakness. Denies new anxiety or depression issues. No new skin problems. Rehabilitation:   Progressing in therapies. PT:  Restrictions/Precautions: Surgical Protocols,Fall Risk,General Precautions  Implants present? : Metal implants (Back surgery, R ankle surgery)  Other position/activity restrictions: No lifting greater than 1-2 lbs for 14 days (sx 4/18/22)  Required Braces or Orthoses  Spinal: Lumbar Corset (donned when out of bed. )  Right Lower Extremity Brace: Ankle Foot Orthotics (Family brought in; Pt complains shoe doesn't fit well/AFO doesn't stay in shoe (very particular about donning))   Transfers  Sit to Stand: Supervision  Stand to sit: Supervision  Bed to Chair: Supervision  Stand Pivot Transfers: Supervision (with, without rolling walker)  Comment: Rolling walker for most transfers, except where noted. Ambulation  Surface: level tile  Device: Rolling Walker  Other Apparatus: Right,AFO (lumbar corset)  Assistance: Supervision,Modified Independent  Quality of Gait: Antalgic gait with longer stance on L LE. Improving fwd flexed posture, deteriorates over time/distance with fatigue/increased back pain with prolonged standing/movement.    Gait Deviations: Increased YASMANI,Decreased step length  Distance: 200' AM & PM; walked back to room with family at end of PM session, 139'  Comments: No difficulty observed or reported. Transfers  Sit to Stand: Supervision  Stand to sit: Supervision  Bed to Chair: Supervision  Stand Pivot Transfers: Supervision (with, without rolling walker)  Comment: Rolling walker for most transfers, except where noted. Ambulation  Surface: level tile  Device: Rolling Walker  Other Apparatus: Right,AFO (lumbar corset)  Assistance: Supervision,Modified Independent  Quality of Gait: Antalgic gait with longer stance on L LE. Improving fwd flexed posture, deteriorates over time/distance with fatigue/increased back pain with prolonged standing/movement. Gait Deviations: Increased YASMANI,Decreased step length  Distance: 200' AM & PM; walked back to room with family at end of PM session, 139'  Comments: No difficulty observed or reported. Surface: level tile  Ambulation  Surface: level tile  Device: Rolling Walker  Other Apparatus: Right,AFO (lumbar corset)  Assistance: Supervision,Modified Independent  Quality of Gait: Antalgic gait with longer stance on L LE. Improving fwd flexed posture, deteriorates over time/distance with fatigue/increased back pain with prolonged standing/movement. Gait Deviations: Increased YASMANI,Decreased step length  Distance: 200' AM & PM; walked back to room with family at end of PM session, 139'  Comments: No difficulty observed or reported. OT:  ADL  Feeding: Modified independent   Feeding Skilled Clinical Factors: per pt report with increased time  Grooming: Stand by assistance  Grooming Skilled Clinical Factors: sitting EOB  UE Bathing: Stand by assistance  UE Bathing Skilled Clinical Factors: Sitting on tub bench with verbal cues for back precautions  LE Bathing: Moderate assistance  LE Bathing Skilled Clinical Factors: A with bilateral lower legs/feet and bottom  UE Dressing:  Moderate assistance  UE Dressing Skilled Clinical Factors: A with doffing/donning back brace  LE Dressing: Maximum assistance  LE Dressing Skilled Clinical Factors: A with doffing/donning bilateral socks and A with threading LLE with increased time and verbal cues for back precautions  Toileting: Minimal assistance  Toileting Skilled Clinical Factors: A for bottom hygiene  Additional Comments: OT facilitated pts engagement in full shower on this date with use of tub bench, grab bars, and hand held shower head. Lunbar corset doffed while sitting on tub bench. Pt required A with all lower body self care tasks at this time. Pt currently limited due to decerased strength, balance, activity tolerance, nausea, and pain limiting safety and independence with self care tasls. PM: OT introduced patient to AE (reacher, sock aid, and long handle sponge) to be used during self care tasks during next session. Instrumental ADL's  Instrumental ADLs: Yes     Balance  Sitting Balance: Stand by assistance  Standing Balance: Minimal assistance (Min A - CGA)   Standing Balance  Time: 1-2 minutes x 4  Activity: Functional transfers/mobility, self care tasks  Comment: Verbal cues for hand placement and safety  Functional Mobility  Functional - Mobility Device: Rolling Walker  Activity: To/from bathroom  Assist Level: Minimal assistance (min A - CGA)     Bed mobility  Bridging: Stand by assistance (Cues for R foot position; groaning with pain during mvmt)  Rolling to Left: Stand by assistance (Log-rolling cues/education review)  Rolling to Right: Stand by assistance (Log-rolling cues/education review)  Supine to Sit: Minimal assistance (Progressing to CGA with repetition; cues for abdominal bracing & timing to minimize lateral bending)  Sit to Supine: Stand by assistance  Scooting: Stand by assistance (laterally on mat)  Comment: Family trainin-2 pillows & EOB on Pt's R, per home environment; mat.  Reviewed supine to sit several times as Pt initially demonstrating LE drop before trunk rise, resulting in lateral spinal flexion. Reviewed precautions and adjusting technique to prevent excessive bending with Pt demonstrating G return. Also reviewed use of abdominal bracing and coordinated breathing technique to try to minimize pain with bridging/scooting. Transfers  Sit to stand: Minimal assistance (min A -CGA)  Stand to sit: Contact guard assistance  Transfer Comments: Verbal cues for hand placement and safety   Toilet Transfers  Toilet - Technique: Ambulating  Equipment Used: Raised toilet seat with rails  Toilet Transfer: Minimal assistance  Toilet Transfers Comments: Verbal cues for hand placement and safety     Shower Transfers  Shower - Transfer From: Walker  Shower - Transfer Type: To and From  Shower - Transfer To:  Transfer tub bench  Shower - Technique: Ambulating  Shower Transfers: Minimal assistance  Shower Transfers Comments: Verbal cues for safety over threshold       SPEECH:      Current Medications:   Current Facility-Administered Medications: fluticasone (FLONASE) 50 MCG/ACT nasal spray 2 spray, 2 spray, Each Nostril, Daily  albuterol sulfate  (90 Base) MCG/ACT inhaler 2 puff, 2 puff, Inhalation, Q6H PRN  latanoprost (XALATAN) 0.005 % ophthalmic solution 1 drop, 1 drop, Both Eyes, Nightly  ondansetron (ZOFRAN) tablet 4 mg, 4 mg, Oral, Q8H PRN  acetaminophen (TYLENOL) tablet 650 mg, 650 mg, Oral, Q4H PRN  polyethylene glycol (GLYCOLAX) packet 17 g, 17 g, Oral, Daily  senna (SENOKOT) tablet 17.2 mg, 2 tablet, Oral, Daily PRN  bisacodyl (DULCOLAX) suppository 10 mg, 10 mg, Rectal, Daily PRN  amLODIPine (NORVASC) tablet 10 mg, 10 mg, Oral, Daily  dorzolamide-timolol (COSOPT) 22.3-6.8 MG/ML ophthalmic solution 1 drop, 1 drop, Both Eyes, BID  montelukast (SINGULAIR) tablet 10 mg, 10 mg, Oral, Daily  mupirocin (BACTROBAN) 2 % ointment, , Topical, BID  rosuvastatin (CRESTOR) tablet 5 mg, 5 mg, Oral, Nightly  tiZANidine (ZANAFLEX) tablet 4 mg, 4 mg, Oral, Q6H PRN  traMADol (ULTRAM) tablet 50 mg, 50 mg, Oral, Q6H PRN  vitamin D (ERGOCALCIFEROL) capsule 50,000 Units, 50,000 Units, Oral, Weekly  heparin (porcine) injection 5,000 Units, 5,000 Units, SubCUTAneous, 3 times per day  pantoprazole (PROTONIX) tablet 40 mg, 40 mg, Oral, QAM AC      Objective:  /72   Pulse 80   Temp 98.2 °F (36.8 °C) (Oral)   Resp 18   Ht 5' 4\" (1.626 m)   Wt 180 lb 3.2 oz (81.7 kg)   SpO2 96%   BMI 30.93 kg/m²       GEN: Well developed, well nourished, no acute distress  HEENT: NCAT. EOM grossly intact. Hearing grossly intact. Mucous membranes pink and moist.  RESP: Normal breath sounds with no wheezing, rales, or rhonchi. Respirations WNL and unlabored. CV: Regular rate and rhythm. No murmurs, rubs, or gallops. ABD: Soft, non-distended, BS+ and equal.  NEURO: Alert. Speech fluent. MSK:  Muscle tone and bulk are normal bilaterally. Moving the bilateral upper limbs with at least antigravity strength. Strength 4+/5 with right knee extension and 0/5 with right ankle dorsiflexion. Strength 5/5 with left knee extension and 4+/5 with left ankle dorsiflexion. LIMBS: No edema in bilateral lower limbs. SKIN: Warm and dry with good turgor. PSYCH: Mood WNL. Affect WNL. Appropriately interactive. Diagnostics:     CBC: No results for input(s): WBC, RBC, HGB, HCT, MCV, RDW, PLT in the last 72 hours. BMP: No results for input(s): NA, K, CL, CO2, PHOS, BUN, CREATININE, CA, GLUCOSE in the last 72 hours. BNP: No results for input(s): BNP in the last 72 hours. PT/INR: No results for input(s): PROTIME, INR in the last 72 hours. APTT: No results for input(s): APTT in the last 72 hours. CARDIAC ENZYMES: No results for input(s): CKMB, CKMBINDEX, TROPONINT in the last 72 hours.     Invalid input(s): CKTOTAL;3  FASTING LIPID PANEL:  Lab Results   Component Value Date    CHOL 223 (H) 07/19/2016    HDL 38 (L) 07/17/2017    TRIG 314 (H) 07/19/2016     LIVER PROFILE: No results for input(s): AST, ALT, ALB, BILIDIR, BILITOT, ALKPHOS in the last 72 hours. CXR, 4/29/22:  Impression   No acute process. Impression/Plan:   Impaired ADLs, gait, and mobility due to:    1. Debility after lumbar laminectomy and fusion for stenosis:  Performed by Dr. Luis Alfredo Sommer on 4/18/22. PT/OT  for gait, mobility, strengthening, endurance, ADLs, and self care. Has Ultram, Tylenol, and Zanaflex as needed. 2. Acute Respiratory Failure: Post-op. Improved. Has albuterol as needed and Singulair - refusing. 3. Allergic rhinitis:  On flonase daily. Monitor. 4. Hx polio with R foot drop: History of R heel and leg lengthening  5. HTN:  On amlodipine  6. HLD: On crestor  7. Nausea/vomiting: Started on pantoprazole per IM. 8. Vitamin D deficiency: On weekly repletion  9. Glaucoma: Cosopt - may use home supply of preservative free, latanoprost nightly  10. Bowel Management: Miralax daily, Senokot prn, Dulcolax prn   11. Internal medicine for medical management  12.  DVT prophylaxis:  On heparin, SCDs and NATHAN stockings      Electronically signed by Randa Salinas MD on 5/1/2022 at 12:07 AM

## 2022-04-30 NOTE — PROGRESS NOTES
Physical Therapy  Facility/Department: [facility]  Rehabilitation Physical Therapy Initial Assessment    NAME: Angie Cronin  : 1950 (66 y.o.)  MRN: [id number]  CODE STATUS: Full Code  Date of Service: 22    Family / Caregiver Present: Yes (PM: son Truman Sesay & daughter-in-law)  General Comment  Comments: Some family training (bed mobility) in PM. On ÁNGEL Ayala's consultation, Pt made Mod I in room with stipulation of use of walker, corset and her slippers & calling nursing if needing assist; RN Madhu Pearl notified. SpO2 & HR WNL during tx. Restrictions:  Restrictions/Precautions: Surgical Protocols; Fall Risk;General Precautions  Required Braces or Orthoses  Spinal: Lumbar Corset (donned when out of bed. )  Right Lower Extremity Brace: Ankle Foot Orthotics (Family brought in; Pt complains shoe doesn't fit well/AFO doesn't stay in shoe (very particular about donning))  Position Activity Restriction  Spinal Precautions: No Bending; No Lifting; No Twisting (strenuous activity  (sx 22))     SUBJECTIVE  Subjective: Pt states her back pain is improving, but it's \"not a good day\" for movement. Requests bed mobility practice in PM with family present. OBJECTIVE  Functional Mobility  Bed mobility  Bridging: Stand by assistance (Cues for R foot position; groaning with pain during mvmt)  Rolling to Left: Stand by assistance (Log-rolling cues/education review)  Rolling to Right: Stand by assistance (Log-rolling cues/education review)  Supine to Sit: Minimal assistance (Progressing to CGA with repetition; cues for abdominal bracing & timing to minimize lateral bending)  Sit to Supine: Stand by assistance  Scooting: Stand by assistance (laterally on mat)  Comment: Family trainin-2 pillows & EOB on Pt's R, per home environment; mat. Reviewed supine to sit several times as Pt initially demonstrating LE drop before trunk rise, resulting in lateral spinal flexion.  Reviewed precautions and adjusting technique to prevent excessive bending with Pt demonstrating G return. Also reviewed use of abdominal bracing and coordinated breathing technique to try to minimize pain with bridging/scooting. Transfers  Sit to Stand: Supervision  Stand to sit: Supervision  Bed to Chair: Supervision  Stand Pivot Transfers: Supervision (with, without rolling walker)  Comment: Rolling walker for most transfers, except where noted. Balance  Posture: Good  Sitting - Static: Good (edge of mat, no back or UE support)  Sitting - Dynamic: Good (Edge of mat, no back or UE support)  Standing - Static: Good (briefly without UE support)  Standing - Dynamic: Good;-  Tandem Stance R Le.25  Tandem Stance L Le  Single Leg Stance R Leg:  (poor tolerance)  Single Leg Stance L Le (better tolerance than R LE)    Environmental Mobility  Ambulation  Surface: level tile  Device: Rolling Walker  Other Apparatus: Right;AFO (lumbar corset)  Assistance: Supervision;Modified Independent  Quality of Gait: Antalgic gait with longer stance on L LE. Improving fwd flexed posture, deteriorates over time/distance with fatigue/increased back pain with prolonged standing/movement. Gait Deviations: Increased YASMANI; Decreased step length  Distance: 200' AM & PM; walked back to room with family at end of PM session, 139'  Comments: No difficulty observed or reported. Stairs/Curb  Stairs?: Yes     22 0744   Stairs   # Steps  10   Stairs Height   (4\" and 6\")   Rails Left ascending;Bilateral  (Pt used both rails for 5 steps, L rail for remaining 5. )   Assistance Supervision   Comment Step-to sequencing; Pt c/o back pain but no observable difficulty noted. PT Exercises  Resistive Exercises: Seated bilateral LEs, 1.5#, green (minimal+) resistance band  Functional Mobility Circuit Training: Sit <> stand x5  Static Standing Balance Exercises: NBOS, tandem, SLS: ~30 sec. each (<5 sec.  R SLS) without UE support + CGA (Body habitus/genu valgum prevents true narrow base of supp.)  Standing Open/Closed Kinetic Chain Exercises: Bilateral: marching, hip flexion/extension 12x each, in walker. Exercise Equipment: NuStep, workload 3, 12 min. ASSESSMENT  Activity Tolerance  Activity Tolerance: Patient tolerated treatment well;Patient limited by pain    Assessment  Discharge Recommendations: Home with assist PRN;Patient would benefit from continued therapy after discharge    GOALS  Short Term Goals  Time Frame for Short term goals: 1 week  Short term goal 1: Pt able to roll in bed maintaingn Back precautions independently  Short term goal 2: Pt able to perfrom supien <>sit SBA  Short term goal 3: Pt ableto transfers SBA wiht rolling walker  Short term goal 4:  Pt able to ambulate 150 ft with rolling walker distance of 150 ft, SBA  Short term goal 5: Pt able to go up and down 5 steps with 2 UE support, min A  Long Term Goals  Time Frame for Long term goals : By DC  Long term goal 1: Pt able to perform supine<>sit mod-I  Long term goal 2: Pt able to transfer mod-I from various surfaces with rolling walker  Long term goal 3: Pt able to ambulte with rolling walker on leel as well as outside terraine at mod- I distnace of atleast 200 ft. Long term goal 4: Pt to improve 2MW distance to 150ft to improve fucntion and gait speed  Long term goal 5: Pt able to go up and down 5 to 12 steps with 1 UE support, SBA    PLAN OF CARE  Plan  Plan:  minutes of therapy at least 5 out of 7 days a week  Current Treatment Recommendations: Strengthening;Balance training;Functional mobility training;Transfer training; Endurance training;Gait training;Stair training; Safety education & training;Patient/Caregiver education & training;Equipment evaluation, education, & procurement  Safety Devices  Type of Devices:  All fall risk precautions in place;Gait belt;Patient at risk for falls    Therapy Time     04/30/22 1121 04/30/22 1334   PT Individual Minutes   Time In 1002 1334   Time Out 1115 1407 Minutes [address] [place] [address] [place], 04/30/22 at 6:19 PM

## 2022-05-01 LAB
ABSOLUTE EOS #: 0.2 K/UL (ref 0–0.4)
ABSOLUTE LYMPH #: 2.6 K/UL (ref 1–4.8)
ABSOLUTE MONO #: 0.8 K/UL (ref 0.1–1.3)
ANION GAP SERPL CALCULATED.3IONS-SCNC: 13 MMOL/L (ref 9–17)
BASOPHILS # BLD: 1 % (ref 0–2)
BASOPHILS ABSOLUTE: 0.1 K/UL (ref 0–0.2)
BUN BLDV-MCNC: 23 MG/DL (ref 8–23)
CALCIUM SERPL-MCNC: 9.7 MG/DL (ref 8.6–10.4)
CHLORIDE BLD-SCNC: 106 MMOL/L (ref 98–107)
CO2: 22 MMOL/L (ref 20–31)
CREAT SERPL-MCNC: 1.27 MG/DL (ref 0.5–0.9)
EOSINOPHILS RELATIVE PERCENT: 2 % (ref 0–4)
GFR AFRICAN AMERICAN: 50 ML/MIN
GFR NON-AFRICAN AMERICAN: 41 ML/MIN
GFR SERPL CREATININE-BSD FRML MDRD: ABNORMAL ML/MIN/{1.73_M2}
GLUCOSE BLD-MCNC: 123 MG/DL (ref 70–99)
HCT VFR BLD CALC: 36.3 % (ref 36–46)
HEMOGLOBIN: 12.2 G/DL (ref 12–16)
LYMPHOCYTES # BLD: 23 % (ref 24–44)
MCH RBC QN AUTO: 30.3 PG (ref 26–34)
MCHC RBC AUTO-ENTMCNC: 33.6 G/DL (ref 31–37)
MCV RBC AUTO: 90.3 FL (ref 80–100)
MONOCYTES # BLD: 7 % (ref 1–7)
PDW BLD-RTO: 14.7 % (ref 11.5–14.9)
PLATELET # BLD: 494 K/UL (ref 150–450)
PMV BLD AUTO: 7.5 FL (ref 6–12)
POTASSIUM SERPL-SCNC: 3.9 MMOL/L (ref 3.7–5.3)
RBC # BLD: 4.01 M/UL (ref 4–5.2)
SEG NEUTROPHILS: 67 % (ref 36–66)
SEGMENTED NEUTROPHILS ABSOLUTE COUNT: 7.5 K/UL (ref 1.3–9.1)
SODIUM BLD-SCNC: 141 MMOL/L (ref 135–144)
WBC # BLD: 11.2 K/UL (ref 3.5–11)

## 2022-05-01 PROCEDURE — 99231 SBSQ HOSP IP/OBS SF/LOW 25: CPT | Performed by: STUDENT IN AN ORGANIZED HEALTH CARE EDUCATION/TRAINING PROGRAM

## 2022-05-01 PROCEDURE — 99232 SBSQ HOSP IP/OBS MODERATE 35: CPT | Performed by: INTERNAL MEDICINE

## 2022-05-01 PROCEDURE — 6360000002 HC RX W HCPCS: Performed by: PHYSICAL MEDICINE & REHABILITATION

## 2022-05-01 PROCEDURE — 6370000000 HC RX 637 (ALT 250 FOR IP): Performed by: PHYSICAL MEDICINE & REHABILITATION

## 2022-05-01 PROCEDURE — 1180000000 HC REHAB R&B

## 2022-05-01 PROCEDURE — 97535 SELF CARE MNGMENT TRAINING: CPT

## 2022-05-01 PROCEDURE — 97530 THERAPEUTIC ACTIVITIES: CPT

## 2022-05-01 PROCEDURE — 97110 THERAPEUTIC EXERCISES: CPT

## 2022-05-01 PROCEDURE — 85025 COMPLETE CBC W/AUTO DIFF WBC: CPT

## 2022-05-01 PROCEDURE — 36415 COLL VENOUS BLD VENIPUNCTURE: CPT

## 2022-05-01 PROCEDURE — 80048 BASIC METABOLIC PNL TOTAL CA: CPT

## 2022-05-01 PROCEDURE — 97116 GAIT TRAINING THERAPY: CPT

## 2022-05-01 RX ADMIN — PANTOPRAZOLE SODIUM 40 MG: 40 TABLET, DELAYED RELEASE ORAL at 06:08

## 2022-05-01 RX ADMIN — HEPARIN SODIUM 5000 UNITS: 5000 INJECTION INTRAVENOUS; SUBCUTANEOUS at 21:26

## 2022-05-01 RX ADMIN — DORZOLAMIDE HYDROCHLORIDE AND TIMOLOL MALEATE 1 DROP: 20; 5 SOLUTION/ DROPS OPHTHALMIC at 21:26

## 2022-05-01 RX ADMIN — ROSUVASTATIN CALCIUM 5 MG: 10 TABLET, FILM COATED ORAL at 21:26

## 2022-05-01 RX ADMIN — ERGOCALCIFEROL 50000 UNITS: 1.25 CAPSULE ORAL at 07:56

## 2022-05-01 RX ADMIN — FLUTICASONE PROPIONATE 2 SPRAY: 50 SPRAY, METERED NASAL at 07:57

## 2022-05-01 RX ADMIN — POLYETHYLENE GLYCOL 3350 17 G: 17 POWDER, FOR SOLUTION ORAL at 07:56

## 2022-05-01 RX ADMIN — HEPARIN SODIUM 5000 UNITS: 5000 INJECTION INTRAVENOUS; SUBCUTANEOUS at 14:06

## 2022-05-01 RX ADMIN — LATANOPROST 1 DROP: 50 SOLUTION OPHTHALMIC at 21:26

## 2022-05-01 RX ADMIN — AMLODIPINE BESYLATE 10 MG: 10 TABLET ORAL at 07:56

## 2022-05-01 RX ADMIN — HEPARIN SODIUM 5000 UNITS: 5000 INJECTION INTRAVENOUS; SUBCUTANEOUS at 06:08

## 2022-05-01 RX ADMIN — ACETAMINOPHEN 650 MG: 325 TABLET ORAL at 21:35

## 2022-05-01 RX ADMIN — ACETAMINOPHEN 650 MG: 325 TABLET ORAL at 01:01

## 2022-05-01 RX ADMIN — ACETAMINOPHEN 650 MG: 325 TABLET ORAL at 14:06

## 2022-05-01 ASSESSMENT — PAIN DESCRIPTION - ORIENTATION
ORIENTATION: LEFT
ORIENTATION: RIGHT;LEFT
ORIENTATION: LEFT

## 2022-05-01 ASSESSMENT — PAIN DESCRIPTION - PAIN TYPE: TYPE: CHRONIC PAIN

## 2022-05-01 ASSESSMENT — PAIN SCALES - GENERAL
PAINLEVEL_OUTOF10: 4
PAINLEVEL_OUTOF10: 6
PAINLEVEL_OUTOF10: 4
PAINLEVEL_OUTOF10: 0
PAINLEVEL_OUTOF10: 3

## 2022-05-01 ASSESSMENT — PAIN DESCRIPTION - LOCATION
LOCATION: BACK;LEG
LOCATION: BUTTOCKS;HIP
LOCATION: BACK
LOCATION: BACK

## 2022-05-01 ASSESSMENT — PAIN DESCRIPTION - DESCRIPTORS
DESCRIPTORS: ACHING
DESCRIPTORS: ACHING;NUMBNESS
DESCRIPTORS: ACHING

## 2022-05-01 ASSESSMENT — PAIN - FUNCTIONAL ASSESSMENT: PAIN_FUNCTIONAL_ASSESSMENT: ACTIVITIES ARE NOT PREVENTED

## 2022-05-01 ASSESSMENT — PAIN DESCRIPTION - ONSET: ONSET: ON-GOING

## 2022-05-01 ASSESSMENT — PAIN DESCRIPTION - FREQUENCY: FREQUENCY: CONTINUOUS

## 2022-05-01 NOTE — PLAN OF CARE
Problem: Pain  Goal: Verbalizes/displays adequate comfort level or baseline comfort level  5/1/2022 1631 by Ravi Fitch RN  Outcome: Progressing  Note: Patient complains of pain, but it is controlled with the use of prn pain meds. Will continue to assess. Problem: Skin/Tissue Integrity  Goal: Absence of new skin breakdown  Description: 1. Monitor for areas of redness and/or skin breakdown  2. Assess vascular access sites hourly  3. Every 4-6 hours minimum:  Change oxygen saturation probe site  4. Every 4-6 hours:  If on nasal continuous positive airway pressure, respiratory therapy assess nares and determine need for appliance change or resting period. 5/1/2022 1631 by Ravi Fitch RN  Outcome: Progressing  Note: There are no new skin issues so far this shift. Will continue to assist patient with repositioning at least every two hours.

## 2022-05-01 NOTE — FLOWSHEET NOTE
05/01/22 1454   Encounter Summary   Encounter Overview/Reason  Volunteer Encounter   Service Provided For: Patient   Referral/Consult From: Rounding   Last Encounter  05/01/22  (V)   Complexity of Encounter Low   Spiritual/Emotional needs   Type Spiritual Support   Rituals, Rites and [address]

## 2022-05-01 NOTE — PROGRESS NOTES
[place] [address] Internal Medicine    CONSULTATION / HISTORY AND PHYSICAL EXAMINATION            Date:   5/1/2022  Patient name:  Collette Haver  Date of admission:  4/23/2022 12:09 PM  MRN:   [id number]  Account:  [de-identified]  YOB: 1950  PCP:    Deborah Mccann MD  Room:   [facility]  Code Status:    Full Code    Physician Requesting Consult: Onelia Ramos MD    Reason for Consult: Medical management    Chief Complaint:     No chief complaint on file.     Debility    History Obtained From:     patient, electronic medical record    History of Present Illness/ Interval History:     s/p lumbar spine decompression for lumbar spine stenosis  59-year-old female with history of previous back surgeries had progressively worsening low back pain for several years.  Notes worsening with walking.  Weakness to lower extremities with increased activity.  She has tried aqua therapy.  Also history of polio with residual right foot drop.  She uses a walker to assist with ambulation.  She was noted to have spinal stenosis L4-L5 level.  Underwent L2-5 laminectomy and interbody fusion L4-5.  Expandable disc spacer interbody L4-5.  Postop developed hypoxia Placed on 3 L oxygen.  On heparin for DVT prophylaxis    Medical history includes hypertension hyperlipidemia GERD    HTN  Onset more than 2 years ago  Charity: mild to mod  Usually controlled with current po meds  Not associated with headaches or blurry vision  No chest pain      Past Medical History:     Past Medical History:   Diagnosis Date    Arthritis     Chronic back pain     ddd    GERD (gastroesophageal reflux disease)     Hyperlipidemia     Hypertension     MVP (mitral valve prolapse)     Polio     right leg at age 2        Past Surgical History:     Past Surgical History:   Procedure Laterality Date    BACK SURGERY      BREAST SURGERY      COLONOSCOPY      HEMORRHOID SURGERY  1988    HYSTERECTOMY      LEG SURGERY fused right heel and right leg lengthened-due to polio    TONSILLECTOMY          Medications Prior to Admission:     Prior to Admission medications    Medication Sig Start Date End Date Taking? Authorizing Provider   alendronate (FOSAMAX) 70 MG tablet Take 1 tablet by mouth every 7 days 7/19/16   Jacob Taylor MD   hydrocortisone (ANUSOL-HC) 2.5 % rectal cream Place rectally 2 times daily. 5/31/16   Jamar Christie MD   simvastatin (ZOCOR) 20 MG tablet Take 1 tablet by mouth nightly 4/21/16   Jacob Taylor MD   losartan-hydrochlorothiazide [facility]) 100-25 MG per tablet Take 1 tablet by mouth daily 4/21/16   Jacob Taylor MD   clobetasol (TEMOVATE) 0.05 % cream Pt uses twice weekly 9/24/15   Jacob Taylor MD   Fluocinolone Acetonide 0.01 % OIL Place 1 drop in ear(s) nightly 7/7/15   Jamar Christie MD   ibuprofen (ADVIL;MOTRIN) 800 MG tablet Take 800 mg by mouth every 8 hours as needed for Pain. 5/28/14 7/19/16  Jamar Christie MD   timolol (TIMOPTIC-XR) 0.5 % ophthalmic gel-forming Place 1 drop into both eyes daily. 9/9/12   Historical Provider, MD   Multiple Vitamins-Minerals (PRESERVISION/LUTEIN) CAPS Take 1 capsule by mouth daily. Historical Provider, MD   Omeprazole-Sodium Bicarbonate (ZEGERID OTC)  MG CAPS Take  by mouth daily. Historical Provider, MD        Allergies:     Crestor [rosuvastatin], Darvocet-n 100 [propoxyphene n-acetaminophen], Equagesic, Lipitor, and Relafen [nabumetone]    Social History:     Tobacco:    reports that she has never smoked. She has never used smokeless tobacco.  Alcohol:      reports no history of alcohol use. Drug Use:  reports no history of drug use. Family History:     Family History   Problem Relation Age of Onset    Cancer Mother         breast    Heart Disease Father        Review of Systems:     Positive and Negative as described in HPI.     Denies any shortness of breath or cough  Denies chest pain or palpitations  Denies abdominal pain, diarrhea vomiting  Denies any new numbness tremors or weakness. Physical Exam:     BP (!) 146/87   Pulse 80   Temp 98.2 °F (36.8 °C)   Resp 14   Ht 5' 4\" (1.626 m)   Wt 180 lb 3.2 oz (81.7 kg)   SpO2 96%   BMI 30.93 kg/m²   Temp (24hrs), Av.2 °F (36.8 °C), Min:98.2 °F (36.8 °C), Max:98.2 °F (36.8 °C)      General appearance:  alert, cooperative and no distress  Eyes: Anicteric sclera. Pupils are equally round and reactive to light. Extraocular movements are intact.   Lungs:  clear to auscultation bilaterally, normal effort  Heart:  regular rate and rhythm, no murmur  Abdomen:  soft, nontender, nondistended, normal bowel sounds, no masses, hepatomegaly, splenomegaly  Extremities:  no edema, redness, tenderness in the calves  Skin:  no gross lesions, rashes, induration  Neuro:  Alert, oriented X 3, no new focal weakness    Investigations:      Laboratory Testing:  Lab Results   Component Value Date    WBC 11.2 (H) 2022    HGB 12.2 2022    HCT 36.3 2022    MCV 90.3 2022     (H) 2022     Lab Results   Component Value Date     2022    K 3.9 2022     2022    CO2 22 2022    BUN 23 2022    CREATININE 1.27 2022    GLUCOSE 123 2022    CALCIUM 9.7 2022         Assessment :      Primary Problem  Debility    Active Hospital Problems    Diagnosis Date Noted    Non-intractable vomiting with nausea [R11.2] 2022     Priority: Medium    Status post lumbar spine surgery for decompression of spinal cord [Z98.890] 2022     Priority: Medium    Debility [R53.81] 2022     Priority: Medium    HTN (hypertension) [I10] 2012    Hypertriglyceridemia [E78.1] 2012       Plan:     Principal Problem:    Debility  Active Problems:    Non-intractable vomiting with nausea    Status post lumbar spine surgery for decompression of spinal cord    HTN (hypertension)    Hypertriglyceridemia  Resolved Problems:    * No resolved hospital problems. *        1. Status post lumbar spine surgery  2. Hypertension continue amlodipine, currently controlled  3. Hyperlipidemia-continue statin  4. GERD-continue Protonix    Cough ,cxr negative  No flonase  Not on ace or arbs  On albuterol  Will watch for now            Consultations:   [facility] TO SOCIAL WORK  IP CONSULT TO INTERNAL MEDICINE  IP CONSULT TO INTERNAL MEDICINE      Thierry Ye MD  5/1/2022  2:42 PM    Copy sent to Dr. Irlanda Bailey MD    Please note that this chart was generated using voice recognition Dragon dictation software. Although every effort was made to ensure the accuracy of this automated transcription, some errors in transcription may have occurred.

## 2022-05-01 NOTE — PROGRESS NOTES
Physical Therapy  Facility/Department: [facility] ACUTE REHAB  Rehabilitation Physical Therapy Initial Assessment    NAME: Karine Arellano  : 1950 (66 y.o.)  MRN: [id number]  CODE STATUS: Full Code  Date of Service: 22    Restrictions:  Restrictions/Precautions: Surgical Protocols; Fall Risk;General Precautions  Required Braces or Orthoses  Spinal: Lumbar Corset (donned when out of bed. )  Right Lower Extremity Brace: Ankle Foot Orthotics (Family brought in; Pt complains shoe doesn't fit well/AFO doesn't stay in shoe (very particular about donning))  Position Activity Restriction  Spinal Precautions: No Bending; No Lifting; No Twisting (strenuous activity  (sx 22))     SUBJECTIVE  Subjective: Pt states back pain is getting better. States  said he'd like to come in during a therapy session. c/o increased back pain in PM; RN Tre Haley notified of pt request for analgesic. OBJECTIVE  Functional Mobility  Bed mobility  Bridging: Maximum assistance (Due to back pain previous day, + assist for greater ROM)  Rolling to Left: Stand by assistance (Log-rolling cues/education review)  Rolling to Right: Stand by assistance (Log-rolling cues/education review)  Supine to Sit: Stand by assistance  Sit to Supine: Stand by assistance  Scooting: Stand by assistance (laterally on mat)  Comment: Mat, 2 pillows; wearing lumbar corset. Transfers  Sit to Stand: Supervision  Stand to sit: Supervision  Bed to Chair: Supervision (no device)  Stand Pivot Transfers: Supervision (with, without rolling walker)  Comment: Rolling walker for most transfers, except where noted.    Balance  Posture: Good  Sitting - Static: Good (edge of mat, no back or UE support)  Sitting - Dynamic: Good (Edge of mat, no back or UE support)  Standing - Static: Good (briefly without UE support)  Standing - Dynamic: Good;- (rolling walker)    Environmental Mobility  Ambulation 2  Surface - 2: level tile;ramp  Device 2: Rolling Walker  Other Apparatus 2: Right;AFO (lumbar corset)  Assistance 2: Stand by assistance  Quality of Gait 2: As above. Demonstrated no additional difficulty with ramp. Gait Deviations: Increased YASMANI; Decreased step length  Distance: 280'  Comments: Visibly short of breath; short seated rest after. Stairs/Curb  Stairs?: Yes     05/01/22 1002   Stairs   # Steps  10   Stairs Height   (4\" and 6\")   Rails Left ascending;Bilateral  (Pt used both rails for 5 steps, L rail for remaining 5. )   Assistance Supervision   Comment Step-to sequencing; Pt c/o back pain but no observable difficulty noted. PT Exercises  A/AROM Exercises: Supine & sidelying bilateral LE exercises, 15x each,   Functional Mobility Circuit Training: Sit <> stand x7  Standing Open/Closed Kinetic Chain Exercises: Bilateral LEs: marching, 3-way hip, squats, hamstring curls, 12-15x each, in parallel bars with bilateral UE support  Exercise Equipment: [employer], workload 3, 12 min. ASSESSMENT  Activity Tolerance  Activity Tolerance: Patient tolerated treatment well;Patient limited by pain    Assessment  Discharge Recommendations: Home with assist PRN;Patient would benefit from continued therapy after discharge    GOALS  Short Term Goals  Time Frame for Short term goals: 1 week  Short term goal 1: Pt able to roll in bed maintaingn Back precautions independently  Short term goal 2: Pt able to perfrom supien <>sit SBA  Short term goal 3: Pt ableto transfers SBA wiht rolling walker  Short term goal 4:  Pt able to ambulate 150 ft with rolling walker distance of 150 ft, SBA  Short term goal 5: Pt able to go up and down 5 steps with 2 UE support, min A  Long Term Goals  Time Frame for Long term goals : By DC  Long term goal 1: Pt able to perform supine<>sit mod-I  Long term goal 2: Pt able to transfer mod-I from various surfaces with rolling walker  Long term goal 3: Pt able to ambulte with rolling walker on leel as well as outside terraine at mod- I distnace of atleast 200 ft.   Long term goal 4: Pt to improve 2MW distance to 150ft to improve fucntion and gait speed  Long term goal 5: Pt able to go up and down 5 to 12 steps with 1 UE support, SBA    PLAN OF CARE  Plan  Plan:  minutes of therapy at least 5 out of 7 days a week  Current Treatment Recommendations: Strengthening;Balance training;Functional mobility training;Transfer training; Endurance training;Gait training;Stair training; Safety education & training;Patient/Caregiver education & training;Equipment evaluation, education, & procurement  Safety Devices  Type of Devices:  All fall risk precautions in place;Gait belt;Patient at risk for falls    Therapy Time     05/01/22 1055 05/01/22 1412   PT Individual Minutes   Time In 1002 1340   Time Out 1108 1410   Minutes 66 200 [facility], [facility], 05/01/22 at 4:32 PM

## 2022-05-01 NOTE — PLAN OF CARE
Problem: Pain  Goal: Verbalizes/displays adequate comfort level or baseline comfort level  5/1/2022 0713 by Carrie Hills RN  Outcome: Progressing  Flowsheets (Taken 5/1/2022 2173)  Verbalizes/displays adequate comfort level or baseline comfort level:   Encourage patient to monitor pain and request assistance   Administer analgesics based on type and severity of pain and evaluate response   Consider cultural and social influences on pain and pain management   Assess pain using appropriate pain scale   Implement non-pharmacological measures as appropriate and evaluate response  Note: Pain assessment completed. Pt. able to rest.  Patient medicated with Tylenol for pain this shift as ordered. Patient relates a tolerable level of discomfort with the current medication. Pt. Repositions per self for comfort. Nonverbal cues indicate pain relief. Pt. Rests quietly with eyes closed after pain medication administration. Respirations easy and unlabored. Appears free from distress. Problem: Skin/Tissue Integrity  Goal: Absence of new skin breakdown  Description: 1. Monitor for areas of redness and/or skin breakdown  2. Assess vascular access sites hourly  3. Every 4-6 hours minimum:  Change oxygen saturation probe site  4. Every 4-6 hours:  If on nasal continuous positive airway pressure, respiratory therapy assess nares and determine need for appliance change or resting period. 5/1/2022 0713 by Carrie Hills RN  Note: Skin assessment completed this shift. Nutrition and Hydration status assessed with adequate intake. Daniel Score as charted. Bilateral heels remain elevated on pillows throughout the shift. Patient able to reposition self for comfort and to prevent breakdown. Patient verbalizes understanding of pressure ulcer prevention measures. Skin integrity maintained. No new skin breakdown noted. Skin to high risk pressure areas including coccyx and heels are clear.

## 2022-05-01 NOTE — PROGRESS NOTES
Occupational Therapy  Facility/Department: [facility] ACUTE REHAB  Rehabilitation Occupational Therapy Daily Treatment Note    Date: 22  Patient Name: Fox Parisi       Room: 2662/4584-77  MRN: [id number]  Account: [de-identified]   : 1950  (72 y.o.) Gender: female                    Past Medical History:  has a past medical history of Arthritis, Chronic back pain, GERD (gastroesophageal reflux disease), Hyperlipidemia, Hypertension, MVP (mitral valve prolapse), and Polio. Past Surgical History:   has a past surgical history that includes Hysterectomy; Hemorrhoid surgery (); Leg Surgery; back surgery; Colonoscopy; Breast surgery; and Tonsillectomy. Restrictions  Restrictions/Precautions: Surgical Protocols; Fall Risk;General Precautions  Implants present? : Metal implants (Back surgery, R ankle surgery)  Other position/activity restrictions: No lifting greater than 1-2 lbs for 14 days (sx 22)  Required Braces or Orthoses  Spinal: Lumbar Corset (donned when out of bed.)  Right Lower Extremity Brace: Ankle Foot Orthotics (Family brought in; Pt complains shoe doesn't fit well/AFO do)  Required Braces or Orthoses?: Yes    Subjective  Subjective: \"I feel better today\" Pt is pleasant and agreeable to therapy this AM.   Pain Location: Back  Restrictions/Precautions: Surgical Protocols; Fall Risk;General Precautions        Pain Assessment  Pain Assessment: 0-10  Pain Level: 1  Patient's Stated Pain Goal: 3  Pain Location: Back  Pain Orientation: Lower  Pain Descriptors: Aching    Objective     Cognition  Overall Cognitive Status: WFL  Orientation  Overall Orientation Status: Within Functional Limits         ADL  Feeding  Assistance Level: Modified independent  Skilled Clinical Factors: per pt report    Grooming/Oral Hygiene  Assistance Level: Modified independent  Skilled Clinical Factors: standing at sink    Upper Extremity Bathing  Assistance Level: Modified independent  Skilled Clinical Factors: completed seated on tub bench    Lower Extremity Bathing  Assistance Level: Set-up  Skilled Clinical Factors: VC to complete seated on bench. good carryover. Upper Extremity Dressing  Assistance Level: Supervision;Set-up (s/u for handing pt corset brace after showering. )  Skilled Clinical Factors: OH shirt and corset brace. Pt instruction in lumbar precautions to keep brace on until seated on shower chair, then keysha again before standing after bathing. Pt demos with good understanding and carryover. Lower Extremity Dressing  Assistance Level: Supervision  Skilled Clinical Factors: VC to have UE support available when standing to pull pants over hips. Putting On/Taking Off Footwear  Assistance Level: Maximum assistance  Skilled Clinical Factors: TA for TEDs. Pt requests assit donning slippers this date. Toileting  Assistance Level: Modified independent  Toilet Transfers  Technique:  (ambulating with RW)  Equipment: Raised toilet seat with arms  Assistance Level: Modified independent  Skilled Clinical Factors: good safety. Tub/Shower Transfers  Type: Shower  Transfer From: Rolling walker  Transfer To: Tub transfer bench  Assistance Level: Supervision  Skilled Clinical Factors: steady. no LOB. Pt education provided on safety and precuations recommending a shower chair for use at home in order to best maintain back precuations and only remove brace during while seated for hygiene. Functional Mobility  Device: Rolling walker  Activity: Retrieve items;Transport items; To/From bathroom  Assistance Level: Modified independent  Skilled Clinical Factors: Pt ambulating around room to gather clothes from closet. Taking few side steps in bahtroom without RW. Steady, no LOB. Supine to Sit  Assistance Level: Modified independent  Skilled Clinical Factors: Pt demos with good safety ti keysha lumbar coreset upon sitting EOB.    Sit to Stand  Assistance Level: Modified independent  Stand to Sit  Assistance Level: Modified independent   OT Exercises  Resistive Exercises: NEAL facilitated pt in use of hand gripper for facilitation of increased  strength for ease with ADLs, 3# X15 reps each. Next pt instruction in tabletop task to play card game with 1# wrist weights donned for facilitation of increased strength and activity tolerance. Pt tolerates well. Assessment  Assessment  Activity Tolerance: Patient tolerated treatment well;Patient limited by pain  Discharge Recommendations: Home with assist PRN;Patient would benefit from continued therapy after discharge  OT Equipment Recommendations  Equipment Needed: Yes  Mobility Devices: ADL Assistive Devices  ADL Assistive Devices: Shower Chair with back  [facility] in place: Yes  Type of devices: Left in chair;Call light within reach    Patient Education  Education  Education Given To: Patient  Education Provided: Safety;Precautions; ADL Function;IADL Function; Energy Conservation;Plan of Care  Education Method: Verbal  Barriers to Learning: None  Education Outcome: Verbalized understanding;Demonstrated understanding    Plan  Plan  Times per Week: 5-7  Times per Day: Twice a day  Current Treatment Recommendations: Self-Care / ADL; Strengthening;ROM;Balance training;Functional mobility training; Endurance training;Pain management; Safety education & training;Patient/Caregiver education & training;Equipment evaluation, education, & procurement;Home management training    Goals  Patient Goals   Patient goals : Destinee Horne now just get back to normal and hope that my pain is gone from my back. \"  Short Term Goals  Time Frame for Short term goals: By 1 week  Short Term Goal 1: Pt will complete lower body dressing/bathing with SBA and Good safety while maintaining back precautions  Short Term Goal 2: Pt will complete functional transfers/mobility during self care tasks with SBA and Good safety  Short Term Goal 3: Pt will complete upper body dressing with SBA and Good safety and compliance of back precautions  Short Term Goal 4: Pt will tolerate standing 5+ minutes during functional activity of choice with Good safety  Short Term Goal 5: Pt will verbalize/demonstrate good understanding AE/adaptive strategies/DME to assist in maintaining back precautions to increase safety and independence with self care and mobility  Short Term Goal 6: Pt will participate in 30+ minutes of theraputic exercises/functional activites to increase safety and independence with self care and mobility  Long Term Goals  Time Frame for Long term goals : By discharge  Long Term Goal 1: Pt will complete BADLs with modified independence and Good safety while maintaining back precautions  Long Term Goal 2: Pt will complete functional transfers/mobility during self care tasks with modified independence with Good safety  Long Term Goal 3: Pt will tolerate standing 10+ minutes during functional activity of choice with Good safety  Long Term Goal 4: Pt will complete simple meal prep/light house keeping task with Supervision and Good safety  Long Term Goal 5: Pt will verbalize/demonstrate Good understanding of home safety/fall prevention strategies to increase safety and independence with self care and mobility  Long Term Goal 6: Pt will demonstrate increased  strength during activities of daily living in R hand as evident by 5# improved  strength          05/01/22 1305   OT Individual Minutes   Time In 0803   Time Out 0933   Minutes 90     ÁNGEL Au/TY

## 2022-05-01 NOTE — PROGRESS NOTES
Physical Medicine & Rehabilitation  Progress Note      Subjective:      Bladimir Hathaway is a 66 y.o. female with lumbar stenosis s/p L2-L5 laminectomy and L4-L5 fusion. She reports doing better today than yesterday. She states that cough is still present but it has improved somewhat. She denies any other acute concerns. ROS:  Denies fevers, chills, sweats. No chest pain, palpitations, lightheadedness. +cough; Denies wheezing or shortness of breath. Denies abdominal pain, nausea, diarrhea or constipation. No new areas of joint pain. Denies new areas of numbness or weakness. Denies new anxiety or depression issues. No new skin problems. Rehabilitation:   Progressing in therapies. PT:  Restrictions/Precautions: Surgical Protocols,Fall Risk,General Precautions  Implants present? : Metal implants (Back surgery, R ankle surgery)  Other position/activity restrictions: No lifting greater than 1-2 lbs for 14 days (sx 4/18/22)  Required Braces or Orthoses  Spinal: Lumbar Corset (donned when out of bed. )  Right Lower Extremity Brace: Ankle Foot Orthotics (Family brought in; Pt complains shoe doesn't fit well/AFO doesn't stay in shoe (very particular about donning))   Transfers  Sit to Stand: Supervision  Stand to sit: Supervision  Bed to Chair: Supervision (no device)  Stand Pivot Transfers: Supervision (with, without rolling walker)  Comment: Rolling walker for most transfers, except where noted. Ambulation  Surface: level tile  Device: Rolling Walker  Other Apparatus: Right,AFO (lumbar corset)  Assistance: Supervision,Modified Independent  Quality of Gait: Antalgic gait with longer stance on L LE. Improving fwd flexed posture, deteriorates over time/distance with fatigue/increased back pain with prolonged standing/movement.    Gait Deviations: Increased YASMANI,Decreased step length  Distance: 200' AM & PM; walked back to room with family at end of PM session, 139'  Comments: No difficulty observed or reported. Transfers  Sit to Stand: Supervision  Stand to sit: Supervision  Bed to Chair: Supervision (no device)  Stand Pivot Transfers: Supervision (with, without rolling walker)  Comment: Rolling walker for most transfers, except where noted. Ambulation  Surface: level tile  Device: Rolling Walker  Other Apparatus: Right,AFO (lumbar corset)  Assistance: Supervision,Modified Independent  Quality of Gait: Antalgic gait with longer stance on L LE. Improving fwd flexed posture, deteriorates over time/distance with fatigue/increased back pain with prolonged standing/movement. Gait Deviations: Increased YASMANI,Decreased step length  Distance: 200' AM & PM; walked back to room with family at end of PM session, 139'  Comments: No difficulty observed or reported. Surface: level tile  Ambulation  Surface: level tile  Device: Rolling Walker  Other Apparatus: Right,AFO (lumbar corset)  Assistance: Supervision,Modified Independent  Quality of Gait: Antalgic gait with longer stance on L LE. Improving fwd flexed posture, deteriorates over time/distance with fatigue/increased back pain with prolonged standing/movement. Gait Deviations: Increased YASMANI,Decreased step length  Distance: 200' AM & PM; walked back to room with family at end of PM session, 139'  Comments: No difficulty observed or reported. OT:  ADL  Feeding: Modified independent   Feeding Skilled Clinical Factors: per pt report with increased time  Grooming: Stand by assistance  Grooming Skilled Clinical Factors: sitting EOB  UE Bathing: Stand by assistance  UE Bathing Skilled Clinical Factors: Sitting on tub bench with verbal cues for back precautions  LE Bathing: Moderate assistance  LE Bathing Skilled Clinical Factors: A with bilateral lower legs/feet and bottom  UE Dressing:  Moderate assistance  UE Dressing Skilled Clinical Factors: A with doffing/donning back brace  LE Dressing: Maximum assistance  LE Dressing Skilled Clinical Factors: A with doffing/donning bilateral socks and A with threading LLE with increased time and verbal cues for back precautions  Toileting: Minimal assistance  Toileting Skilled Clinical Factors: A for bottom hygiene  Additional Comments: OT facilitated pts engagement in full shower on this date with use of tub bench, grab bars, and hand held shower head. Lunbar corset doffed while sitting on tub bench. Pt required A with all lower body self care tasks at this time. Pt currently limited due to decerased strength, balance, activity tolerance, nausea, and pain limiting safety and independence with self care tasls. PM: OT introduced patient to AE (reacher, sock aid, and long handle sponge) to be used during self care tasks during next session. Instrumental ADL's  Instrumental ADLs: Yes     Balance  Sitting Balance: Stand by assistance  Standing Balance: Minimal assistance (Min A - CGA)   Standing Balance  Time: 1-2 minutes x 4  Activity: Functional transfers/mobility, self care tasks  Comment: Verbal cues for hand placement and safety  Functional Mobility  Functional - Mobility Device: Rolling Walker  Activity: To/from bathroom  Assist Level: Minimal assistance (min A - CGA)     Bed mobility  Bridging: Maximum assistance (Due to back pain previous day, + assist for greater ROM)  Rolling to Left: Stand by assistance (Log-rolling cues/education review)  Rolling to Right: Stand by assistance (Log-rolling cues/education review)  Supine to Sit: Stand by assistance  Sit to Supine: Stand by assistance  Scooting: Stand by assistance (laterally on mat)  Comment: Mat, 2 pillows; wearing lumbar corset.    Transfers  Sit to stand: Minimal assistance (min A -CGA)  Stand to sit: Contact guard assistance  Transfer Comments: Verbal cues for hand placement and safety   Toilet Transfers  Toilet - Technique: Ambulating  Equipment Used: Raised toilet seat with rails  Toilet Transfer: Minimal assistance  Toilet Transfers Comments: Verbal cues for hand placement and safety     Shower Transfers  Shower - Transfer From: Raymon Billingsley - Transfer Type: To and From  Shower - Transfer To: Transfer tub bench  Shower - Technique: Ambulating  Shower Transfers: Minimal assistance  Shower Transfers Comments: Verbal cues for safety over threshold       SPEECH:      Current Medications:   Current Facility-Administered Medications: fluticasone (FLONASE) 50 MCG/ACT nasal spray 2 spray, 2 spray, Each Nostril, Daily  albuterol sulfate  (90 Base) MCG/ACT inhaler 2 puff, 2 puff, Inhalation, Q6H PRN  latanoprost (XALATAN) 0.005 % ophthalmic solution 1 drop, 1 drop, Both Eyes, Nightly  ondansetron (ZOFRAN) tablet 4 mg, 4 mg, Oral, Q8H PRN  acetaminophen (TYLENOL) tablet 650 mg, 650 mg, Oral, Q4H PRN  polyethylene glycol (GLYCOLAX) packet 17 g, 17 g, Oral, Daily  senna (SENOKOT) tablet 17.2 mg, 2 tablet, Oral, Daily PRN  bisacodyl (DULCOLAX) suppository 10 mg, 10 mg, Rectal, Daily PRN  amLODIPine (NORVASC) tablet 10 mg, 10 mg, Oral, Daily  dorzolamide-timolol (COSOPT) 22.3-6.8 MG/ML ophthalmic solution 1 drop, 1 drop, Both Eyes, BID  montelukast (SINGULAIR) tablet 10 mg, 10 mg, Oral, Daily  mupirocin (BACTROBAN) 2 % ointment, , Topical, BID  rosuvastatin (CRESTOR) tablet 5 mg, 5 mg, Oral, Nightly  tiZANidine (ZANAFLEX) tablet 4 mg, 4 mg, Oral, Q6H PRN  traMADol (ULTRAM) tablet 50 mg, 50 mg, Oral, Q6H PRN  vitamin D (ERGOCALCIFEROL) capsule 50,000 Units, 50,000 Units, Oral, Weekly  heparin (porcine) injection 5,000 Units, 5,000 Units, SubCUTAneous, 3 times per day  pantoprazole (PROTONIX) tablet 40 mg, 40 mg, Oral, QAM AC      Objective:  BP (!) 148/83   Pulse 87   Temp 98.4 °F (36.9 °C) (Oral)   Resp 18   Ht 5' 4\" (1.626 m)   Wt 180 lb 3.2 oz (81.7 kg)   SpO2 97%   BMI 30.93 kg/m²       GEN: Well developed, well nourished, no acute distress  HEENT: NCAT. EOM grossly intact. Hearing grossly intact.   Mucous membranes pink and moist.  RESP: Normal breath sounds with no wheezing, rales, or rhonchi. Respirations WNL and unlabored. CV: Regular rate and rhythm. No murmurs, rubs, or gallops. ABD: Soft, non-distended, BS+ and equal.  NEURO: Alert. Speech fluent. MSK:  Muscle tone and bulk are normal bilaterally. Moving the bilateral upper limbs with at least antigravity strength. Strength 4+/5 with right knee extension. Strength 4+/5 with left knee extension and left ankle dorsiflexion. Right AFO in place. LIMBS: No edema in bilateral lower limbs. SKIN: Warm and dry with good turgor. PSYCH: Mood WNL. Affect WNL. Appropriately interactive. Diagnostics:     CBC:   Recent Labs     05/01/22  0622   WBC 11.2*   RBC 4.01   HGB 12.2   HCT 36.3   MCV 90.3   RDW 14.7   *     BMP:   Recent Labs     05/01/22  0622      K 3.9      CO2 22   BUN 23   CREATININE 1.27*   GLUCOSE 123*     BNP: No results for input(s): BNP in the last 72 hours. PT/INR: No results for input(s): PROTIME, INR in the last 72 hours. APTT: No results for input(s): APTT in the last 72 hours. CARDIAC ENZYMES: No results for input(s): CKMB, CKMBINDEX, TROPONINT in the last 72 hours. Invalid input(s): CKTOTAL;3  FASTING LIPID PANEL:  Lab Results   Component Value Date    CHOL 223 (H) 07/19/2016    HDL 38 (L) 07/17/2017    TRIG 314 (H) 07/19/2016     LIVER PROFILE: No results for input(s): AST, ALT, ALB, BILIDIR, BILITOT, ALKPHOS in the last 72 hours. Impression/Plan:   Impaired ADLs, gait, and mobility due to:    1. Debility after lumbar laminectomy and fusion for stenosis:  Performed by Dr. France Manning on 4/18/22. PT/OT  for gait, mobility, strengthening, endurance, ADLs, and self care. Has Ultram, Tylenol, and Zanaflex as needed. 2. Acute Respiratory Failure: Post-op. Improved. Has albuterol as needed and Singulair - refusing. 3. Allergic rhinitis:  On flonase daily. Monitoring. 4. History of polio with right foot drop: History of right heel and leg lengthening.   Has right AFO.  5. HTN:  On amlodipine  6. HLD: On crestor  7. Nausea/vomiting: Started on pantoprazole per IM. 8. Vitamin D deficiency: On weekly repletion  9. Glaucoma: Cosopt - may use home supply of preservative free, latanoprost nightly  10. Bowel Management: Miralax daily, Senokot prn, Dulcolax prn   11. Internal medicine for medical management  12. DVT prophylaxis:  On heparin, SCDs and NATHAN stockings        Ana Laura Granger is seen in face-to-face evaluation and requires the following durable medical equipment in order to perform ADLs and mobility related ADLs in the home.     BATH/SHOWER SEAT MISC    Bath/Shower Bench with back    Weight: Weight: 180 lb 3.2 oz (81.7 kg)  Diagnosis: Debility secondary to lumbar stenosis s/p laminectomy and fusion  Duration: Purchase      Electronically signed by Marie aGllegos MD on 5/1/2022 at 8:43 PM

## 2022-05-02 PROCEDURE — 99232 SBSQ HOSP IP/OBS MODERATE 35: CPT | Performed by: STUDENT IN AN ORGANIZED HEALTH CARE EDUCATION/TRAINING PROGRAM

## 2022-05-02 PROCEDURE — 97530 THERAPEUTIC ACTIVITIES: CPT

## 2022-05-02 PROCEDURE — 99232 SBSQ HOSP IP/OBS MODERATE 35: CPT | Performed by: INTERNAL MEDICINE

## 2022-05-02 PROCEDURE — 6360000002 HC RX W HCPCS: Performed by: PHYSICAL MEDICINE & REHABILITATION

## 2022-05-02 PROCEDURE — 97110 THERAPEUTIC EXERCISES: CPT

## 2022-05-02 PROCEDURE — 1180000000 HC REHAB R&B

## 2022-05-02 PROCEDURE — 6370000000 HC RX 637 (ALT 250 FOR IP): Performed by: PHYSICAL MEDICINE & REHABILITATION

## 2022-05-02 PROCEDURE — 97535 SELF CARE MNGMENT TRAINING: CPT

## 2022-05-02 PROCEDURE — 97116 GAIT TRAINING THERAPY: CPT

## 2022-05-02 RX ADMIN — TRAMADOL HYDROCHLORIDE 50 MG: 50 TABLET, COATED ORAL at 20:34

## 2022-05-02 RX ADMIN — HEPARIN SODIUM 5000 UNITS: 5000 INJECTION INTRAVENOUS; SUBCUTANEOUS at 13:52

## 2022-05-02 RX ADMIN — DORZOLAMIDE HYDROCHLORIDE AND TIMOLOL MALEATE 1 DROP: 20; 5 SOLUTION/ DROPS OPHTHALMIC at 08:13

## 2022-05-02 RX ADMIN — TIZANIDINE 4 MG: 4 TABLET ORAL at 20:35

## 2022-05-02 RX ADMIN — HEPARIN SODIUM 5000 UNITS: 5000 INJECTION INTRAVENOUS; SUBCUTANEOUS at 05:32

## 2022-05-02 RX ADMIN — PANTOPRAZOLE SODIUM 40 MG: 40 TABLET, DELAYED RELEASE ORAL at 05:32

## 2022-05-02 RX ADMIN — ROSUVASTATIN CALCIUM 5 MG: 10 TABLET, FILM COATED ORAL at 20:34

## 2022-05-02 RX ADMIN — AMLODIPINE BESYLATE 10 MG: 10 TABLET ORAL at 08:07

## 2022-05-02 RX ADMIN — ACETAMINOPHEN 650 MG: 325 TABLET ORAL at 13:53

## 2022-05-02 RX ADMIN — HEPARIN SODIUM 5000 UNITS: 5000 INJECTION INTRAVENOUS; SUBCUTANEOUS at 23:32

## 2022-05-02 RX ADMIN — LATANOPROST 1 DROP: 50 SOLUTION OPHTHALMIC at 20:40

## 2022-05-02 RX ADMIN — ACETAMINOPHEN 650 MG: 325 TABLET ORAL at 01:38

## 2022-05-02 RX ADMIN — FLUTICASONE PROPIONATE 2 SPRAY: 50 SPRAY, METERED NASAL at 08:13

## 2022-05-02 ASSESSMENT — PAIN DESCRIPTION - DESCRIPTORS
DESCRIPTORS: ACHING;DULL;THROBBING
DESCRIPTORS: ACHING
DESCRIPTORS: ACHING;NUMBNESS

## 2022-05-02 ASSESSMENT — PAIN SCALES - WONG BAKER
WONGBAKER_NUMERICALRESPONSE: 0

## 2022-05-02 ASSESSMENT — PAIN DESCRIPTION - LOCATION
LOCATION: HEAD;BACK
LOCATION: BACK
LOCATION: BACK;LEG
LOCATION: BACK

## 2022-05-02 ASSESSMENT — PAIN SCALES - GENERAL
PAINLEVEL_OUTOF10: 0
PAINLEVEL_OUTOF10: 2
PAINLEVEL_OUTOF10: 4
PAINLEVEL_OUTOF10: 7
PAINLEVEL_OUTOF10: 0
PAINLEVEL_OUTOF10: 4

## 2022-05-02 ASSESSMENT — PAIN DESCRIPTION - ORIENTATION
ORIENTATION: LEFT;RIGHT;LOWER
ORIENTATION: RIGHT
ORIENTATION: RIGHT;LEFT

## 2022-05-02 NOTE — PROGRESS NOTES
Physical Medicine & Rehabilitation  Progress Note      Subjective:      Tim Aaron is a 66 y.o. female with lumbar stenosis s/p L2-L5 laminectomy and L4-L5 fusion. She reports doing pretty well today. She feels like she is making progress with therapies. Discussed possible outpatient therapy upon discharge - she states that she would have someone to drive her to appointments. Will discuss therapy recommendations with the team as well. She denies any other acute concerns. ROS:  Denies fevers, chills, sweats. No chest pain, palpitations, lightheadedness. +cough; Denies wheezing or shortness of breath. Denies abdominal pain, nausea, diarrhea or constipation. No new areas of joint pain. Denies new areas of numbness or weakness. Denies new anxiety or depression issues. No new skin problems. Rehabilitation:   Progressing in therapies. PT:  Restrictions/Precautions: Surgical Protocols,Fall Risk,General Precautions  Implants present? : Metal implants (Back surgery, R ankle surgery)  Other position/activity restrictions: No lifting greater than 1-2 lbs for 14 days (sx 4/18/22)  Required Braces or Orthoses  Spinal: Lumbar Corset (donned when out of bed. )  Right Lower Extremity Brace: Ankle Foot Orthotics (Family brought in; Pt complains shoe doesn't fit well/AFO do)   Transfers  Sit to Stand: Modified independent  Stand to sit: Modified independent  Bed to Chair: Modified independent (with and without AD)  Stand Pivot Transfers: Modified independent (with and without AD)  Comment: Rolling walker for most transfers, except where noted. Ambulation  Surface: level tile  Device: Rolling Walker  Other Apparatus: Right,AFO (lumbar corset)  Assistance: Supervision,Modified Independent  Quality of Gait: gait with longer stance on L LE. Improving fwd flexed posture, deteriorates over time/distance with fatigue/increased back pain with prolonged standing/movement.    Gait Deviations: Decreased step length,Decreased step height  Distance: [id number] 139ft x 1  Comments: Pt wanting to see how she does amb without AFO this morning. Wants to be able to wear sandles. Pt ed on safety and advised her to wear brace, however realized once pt is home she will be making her own decisions. No LOB or catching of toes with amb without use of AFO. Second distance amb with wearing AFO    Transfers  Sit to Stand: Modified independent  Stand to sit: Modified independent  Bed to Chair: Modified independent (with and without AD)  Stand Pivot Transfers: Modified independent (with and without AD)  Comment: Rolling walker for most transfers, except where noted. Ambulation  Surface: level tile  Device: Rolling Walker  Other Apparatus: Right,AFO (lumbar corset)  Assistance: Supervision,Modified Independent  Quality of Gait: gait with longer stance on L LE. Improving fwd flexed posture, deteriorates over time/distance with fatigue/increased back pain with prolonged standing/movement. Gait Deviations: Decreased step length,Decreased step height  Distance: [id number] 139ft x 1  Comments: Pt wanting to see how she does amb without AFO this morning. Wants to be able to wear sandles. Pt ed on safety and advised her to wear brace, however realized once pt is home she will be making her own decisions. No LOB or catching of toes with amb without use of AFO. Second distance amb with wearing AFO    Surface: level tile  Ambulation  Surface: level tile  Device: Rolling Walker  Other Apparatus: Right,AFO (lumbar corset)  Assistance: Supervision,Modified Independent  Quality of Gait: gait with longer stance on L LE. Improving fwd flexed posture, deteriorates over time/distance with fatigue/increased back pain with prolonged standing/movement. Gait Deviations: Decreased step length,Decreased step height  Distance: [id number] 139ft x 1  Comments: Pt wanting to see how she does amb without AFO this morning. Wants to be able to wear sandles.  Pt ed on safety and advised her to wear brace, however realized once pt is home she will be making her own decisions. No LOB or catching of toes with amb without use of AFO. Second distance amb with wearing AFO    OT:  ADL  Feeding: Modified independent   Feeding Skilled Clinical Factors: per pt report with increased time  Grooming: Stand by assistance  Grooming Skilled Clinical Factors: sitting EOB  UE Bathing: Stand by assistance  UE Bathing Skilled Clinical Factors: Sitting on tub bench with verbal cues for back precautions  LE Bathing: Moderate assistance  LE Bathing Skilled Clinical Factors: A with bilateral lower legs/feet and bottom  UE Dressing: Moderate assistance  UE Dressing Skilled Clinical Factors: A with doffing/donning back brace  LE Dressing: Maximum assistance  LE Dressing Skilled Clinical Factors: A with doffing/donning bilateral socks and A with threading LLE with increased time and verbal cues for back precautions  Toileting: Minimal assistance  Toileting Skilled Clinical Factors: A for bottom hygiene  Additional Comments: OT facilitated pts engagement in full shower on this date with use of tub bench, grab bars, and hand held shower head. Lunbar corset doffed while sitting on tub bench. Pt required A with all lower body self care tasks at this time. Pt currently limited due to decerased strength, balance, activity tolerance, nausea, and pain limiting safety and independence with self care tasls. PM: OT introduced patient to AE (reacher, sock aid, and long handle sponge) to be used during self care tasks during next session.     Instrumental ADL's  Instrumental ADLs: Yes     Balance  Sitting Balance: Stand by assistance  Standing Balance: Minimal assistance (Min A - CGA)   Standing Balance  Time: 1-2 minutes x 4  Activity: Functional transfers/mobility, self care tasks  Comment: Verbal cues for hand placement and safety  Functional Mobility  Functional - Mobility Device: Rolling Walker  Activity: To/from bathroom  Assist Level: Minimal assistance (min A - CGA)     Bed mobility  Bridging: Maximum assistance (Due to back pain previous day, + assist for greater ROM)  Rolling to Left: Stand by assistance (Log-rolling cues/education review)  Rolling to Right: Stand by assistance (Log-rolling cues/education review)  Supine to Sit: Modified independent  Sit to Supine: Modified independent  Scooting: Modified independent  Comment: Pt is Mod I in her room. To don back brace when out of bed. Transfers  Sit to stand: Minimal assistance (min A -CGA)  Stand to sit: Contact guard assistance  Transfer Comments: Verbal cues for hand placement and safety   Toilet Transfers  Toilet - Technique: Ambulating  Equipment Used: Raised toilet seat with rails  Toilet Transfer: Minimal assistance  Toilet Transfers Comments: Verbal cues for hand placement and safety     Shower Transfers  Shower - Transfer From: Walker  Shower - Transfer Type: To and From  Shower - Transfer To:  Transfer tub bench  Shower - Technique: Ambulating  Shower Transfers: Minimal assistance  Shower Transfers Comments: Verbal cues for safety over threshold       SPEECH:      Current Medications:   Current Facility-Administered Medications: fluticasone (FLONASE) 50 MCG/ACT nasal spray 2 spray, 2 spray, Each Nostril, Daily  albuterol sulfate  (90 Base) MCG/ACT inhaler 2 puff, 2 puff, Inhalation, Q6H PRN  latanoprost (XALATAN) 0.005 % ophthalmic solution 1 drop, 1 drop, Both Eyes, Nightly  ondansetron (ZOFRAN) tablet 4 mg, 4 mg, Oral, Q8H PRN  acetaminophen (TYLENOL) tablet 650 mg, 650 mg, Oral, Q4H PRN  polyethylene glycol (GLYCOLAX) packet 17 g, 17 g, Oral, Daily  senna (SENOKOT) tablet 17.2 mg, 2 tablet, Oral, Daily PRN  bisacodyl (DULCOLAX) suppository 10 mg, 10 mg, Rectal, Daily PRN  amLODIPine (NORVASC) tablet 10 mg, 10 mg, Oral, Daily  dorzolamide-timolol (COSOPT) 22.3-6.8 MG/ML ophthalmic solution 1 drop, 1 drop, Both Eyes, BID  montelukast (SINGULAIR) tablet 10 mg, 10 mg, Oral, Daily  mupirocin (BACTROBAN) 2 % ointment, , Topical, BID  rosuvastatin (CRESTOR) tablet 5 mg, 5 mg, Oral, Nightly  tiZANidine (ZANAFLEX) tablet 4 mg, 4 mg, Oral, Q6H PRN  traMADol (ULTRAM) tablet 50 mg, 50 mg, Oral, Q6H PRN  vitamin D (ERGOCALCIFEROL) capsule 50,000 Units, 50,000 Units, Oral, Weekly  heparin (porcine) injection 5,000 Units, 5,000 Units, SubCUTAneous, 3 times per day  pantoprazole (PROTONIX) tablet 40 mg, 40 mg, Oral, QAM AC      Objective:  BP (!) 148/79   Pulse 88   Temp 97.5 °F (36.4 °C) (Oral)   Resp 18   Ht 5' 4\" (1.626 m)   Wt 180 lb 3.2 oz (81.7 kg)   SpO2 97%   BMI 30.93 kg/m²       GEN: Well developed, well nourished, no acute distress  HEENT: NCAT. EOM grossly intact. Hearing grossly intact. Mucous membranes pink and moist.  RESP: Normal breath sounds with no wheezing, rales, or rhonchi. Respirations WNL and unlabored. CV: Regular rate and rhythm. No murmurs, rubs, or gallops. ABD: Soft, non-distended, BS+ and equal.  NEURO: Alert. Speech fluent. Sensation to light touch intact in bilateral lower limbs. MSK:  Muscle tone and bulk are normal bilaterally. Moving the bilateral upper limbs with at least antigravity strength. Strength 4+/5 with right knee extension. Strength 1/5 with right ankle dorsiflexion. Strength 4+/5 with left knee extension and left ankle dorsiflexion. LIMBS: No edema in bilateral lower limbs. SKIN: Warm and dry with good turgor. PSYCH: Mood WNL. Affect WNL. Appropriately interactive. Diagnostics:     CBC:   Recent Labs     05/01/22  0622   WBC 11.2*   RBC 4.01   HGB 12.2   HCT 36.3   MCV 90.3   RDW 14.7   *     BMP:   Recent Labs     05/01/22  0622      K 3.9      CO2 22   BUN 23   CREATININE 1.27*   GLUCOSE 123*     BNP: No results for input(s): BNP in the last 72 hours. PT/INR: No results for input(s): PROTIME, INR in the last 72 hours. APTT: No results for input(s):  APTT in the last 72 hours.  CARDIAC ENZYMES: No results for input(s): CKMB, CKMBINDEX, TROPONINT in the last 72 hours. Invalid input(s): CKTOTAL;3  FASTING LIPID PANEL:  Lab Results   Component Value Date    CHOL 223 (H) 07/19/2016    HDL 38 (L) 07/17/2017    TRIG 314 (H) 07/19/2016     LIVER PROFILE: No results for input(s): AST, ALT, ALB, BILIDIR, BILITOT, ALKPHOS in the last 72 hours. Impression/Plan:   Impaired ADLs, gait, and mobility due to:    1. Debility after lumbar laminectomy and fusion for stenosis:  Performed by Dr. Leif Burton on 4/18/22. PT/OT  for gait, mobility, strengthening, endurance, ADLs, and self care. Has Ultram, Tylenol, and Zanaflex as needed. 2. Acute Respiratory Failure: Post-op. Improved. Has albuterol as needed and Singulair - refusing. 3. Allergic rhinitis:  On flonase daily. Monitoring. 4. History of polio with right foot drop: History of right heel and leg lengthening. Has right AFO. 5. HTN:  On amlodipine  6. HLD: On crestor  7. Nausea/vomiting: Started on pantoprazole per IM. 8. Vitamin D deficiency: On weekly repletion  9. Glaucoma: Cosopt - may use home supply of preservative free, latanoprost nightly  10. Bowel Management: Miralax daily, Senokot prn, Dulcolax prn   11. Internal medicine for medical management  12. DVT prophylaxis:  On heparin, SCDs and NATHAN stockings  13. Discussed possible outpatient therapy with patient upon discharge - will discuss with team as well      Cherie Bender was evaluated today and a DME order was entered for a wheeled walker because she requires this to successfully complete daily living tasks of toileting, personal cares and ambulating. A wheeled walker is necessary due to the patient's unsteady gait, upper body weakness, and inability to  an ambulation device; and she can ambulate only by pushing a walker instead of a lesser assistive device such as a cane, crutch, or standard walker.   The need for this equipment was discussed with the patient and she understands and is in agreement.       Electronically signed by Esteban Mora MD on 5/2/2022 at 10:11 PM

## 2022-05-02 NOTE — PROGRESS NOTES
Comprehensive Nutrition Assessment    Type and Reason for Visit:  Reassess    Nutrition Recommendations/Plan:   1. Continue current diet. Malnutrition Assessment:  Malnutrition Status:  No malnutrition (04/24/22 1446)    Context:  Acute Illness     Findings of the 6 clinical characteristics of malnutrition:  Energy Intake:  Mild decrease in energy intake (Comment)  Weight Loss:  No significant weight loss     Body Fat Loss:  No significant body fat loss     Muscle Mass Loss:  No significant muscle mass loss    Fluid Accumulation:  No significant fluid accumulation     Strength:  Not Performed    Nutrition Assessment:    Pt soundly sleeping or with staff x 3 attempted visits today. Observed lunch tray with 100% consumed. Nutrition Related Findings:    Edema: trace RLE, LLE. Labs and meds reviewed. Wound Type: Surgical Incision       Current Nutrition Intake & Therapies:    Average Meal Intake: 51-75%,%     ADULT DIET; Regular    Anthropometric Measures:  Height: 5' 4\" (162.6 cm)  Ideal Body Weight (IBW): 120 lbs (55 kg)    Admission Body Weight: 182 lb (82.6 kg)  Current Body Weight: 180 lb 1.9 oz (81.7 kg), 151.7 % IBW. Weight Source: Bed Scale  Current BMI (kg/m2): 30.9  Usual Body Weight: 183 lb (83 kg)  % Weight Change (Calculated): -0.5                    BMI Categories: Obese Class 1 (BMI 30.0-34. 9)    Estimated Daily Nutrient Needs:  Energy Requirements Based On: Formula  Weight Used for Energy Requirements: Admission  Energy (kcal/day): 1719  Weight Used for Protein Requirements: Ideal  Protein (g/day): 76.3      Nutrition Diagnosis:   · Inadequate oral intake related to altered GI function as evidenced by  (Previous decreased intake)    Nutrition Interventions:   Food and/or Nutrient Delivery: Continue Current Diet  Nutrition Education/Counseling: Education not indicated  Coordination of Nutrition Care: Continue to monitor while inpatient       Goals:  Previous Goal Met: Progressing toward Goal(s)  Goals: Meet at least 75% of estimated needs       Nutrition Monitoring and Evaluation:   Behavioral-Environmental Outcomes: None Identified  Food/Nutrient Intake Outcomes: Food and Nutrient Intake  Physical Signs/Symptoms Outcomes: Biochemical Data,GI Status,Nausea or Vomiting,Nutrition Focused Physical Findings,Skin,Weight    Discharge Planning:    Continue current diet     Ambrose Hahn R.D., LISY.   Phone: [phone]

## 2022-05-02 NOTE — CARE COORDINATION
DME order for shower chair/seat along with Demographic sheet faxed to [address]. Received call from [facility] and this DME is not covered by patient insurance. Will notify OT.

## 2022-05-02 NOTE — PATIENT CARE CONFERENCE
[facility] Acute Inpatient Rehabilitation  TEAM CONFERENCE NOTE  Date: 5/3/22  Patient Name: Katherine Calvo       Room: 2505/4823-31  MRN: [id number]       : 1950  (66 y.o.)     Gender: female   Referring Practitioner: Dr Alvaro Duarte [[id number]]  Diagnosis: Debility     NURSING  Bladder  Always Continent  Bowel   Always Continent  Date of Last BM: 22  Intervention    Bowel Program    Wounds/Incisions/Ulcers: Incision healing well  Medication Education Program: Patient able to manage medications and being educated by nursing  Pain: Patient's pain is currently controlled with -  Tylenol 650 mg every 4 hrs PRN   Tizanidine 4 mg tab every 6hrs PRN  Tramadol 50 mg tab every 6hrs PRN      Fall Risk:  Falling star program initiated    PHYSICAL THERAPY  Bed mobility  Rolling to Left: Stand by assistance (Log-rolling cues/education review)  Rolling to Right: Stand by assistance (Log-rolling cues/education review)  Supine to Sit: Modified independent  Sit to Supine: Modified independent  Scooting: Modified independent  Comment: Pt is Mod I in her room. To don back brace when out of bed. Transfers:  Sit to Stand: Modified independent  Stand to sit: Modified independent  Bed to Chair: Modified independent (with and without AD)    Ambulation  Surface: level tile  Device: Rolling Walker  Other Apparatus: Right;AFO (lumbar corset)  Assistance: Supervision;Modified Independent  Quality of Gait: gait with longer stance on L LE. Improving fwd flexed posture, deteriorates over time/distance with fatigue/increased back pain with prolonged standing/movement. Gait Deviations: Decreased step length;Decreased step height  Distance: [id number] 139ft x 1  Comments: Pt wanting to see how she does amb without AFO this morning. Wants to be able to wear sandles. Pt ed on safety and advised her to wear brace, however realized once pt is home she will be making her own decisions.  No LOB or catching of toes with amb without use of AFO. Second distance amb with wearing AFO  Ambulation 2  Surface - 2: level tile;ramp  Device 2: Rolling Walker  Other Apparatus 2: Right;AFO (lumbar corset)  Assistance 2: Stand by assistance  Quality of Gait 2: As above. Demonstrated no additional difficulty with ramp. Gait Deviations: Increased YASMANI; Decreased step length  Distance: 280'      # Steps : 15 (practiced three 7.5 in steps in pm due to steps at home.)  Stairs Height:  (4\" and 6\")  Rails: Left ascending  Assistance: Supervision;Modified independent ;Minimal assistance (Min A (support on R) with 7.5in steps)  Comment: Step-to sequencing; Pt experienced increase SOB and dizziness post steps. Symptoms subsided upon seated rest break  Pt came to pm therapy session with home access through garage and using front enterance. Worked on set up and best technique for pt. Reports spouse will attend tomorrows pm session as well for family training. (Pt reports increase back pain w/6 and 7.5in steps)       Presents with B LE weakness, R LE > L LE, also history of polio with R side weakness and R foot drop. Pt requires min A for mobility at this time with rolling walker. Needs skilled physical therapy to progress to PLOF.     Goals  Time Frame for Short term goals: 1 week  Short term goal 1: Pt able to roll in bed maintaingn Back precautions independently  Short term goal 2: Pt able to perfrom supien <>sit SBA  Short term goal 3: Pt ableto transfers SBA wiht rolling walker  Short term goal 4:  Pt able to ambulate 150 ft with rolling walker distance of 150 ft, SBA  Short term goal 5: Pt able to go up and down 5 steps with 2 UE support, min A      OCCUPATIONAL THERAPY  SELF CARE     Eating      Feeding  Assistance Level: Modified independent  Skilled Clinical Factors: per pt report       Oral Hygiene      Grooming/Oral Hygiene  Assistance Level: Modified independent  Skilled Clinical Factors: standing at sink       Shower/Bathe Self     Upper Extremity Bathing  Equipment Provided: Long-handled sponge  Assistance Level: Modified independent  Skilled Clinical Factors: completed seated on tub bench   Lower Extremity Bathing  Equipment Provided: Long-handled sponge  Assistance Level: Modified independent  Skilled Clinical Factors: good carryover to complete seated on bench       Upper Body Dressing     Upper Extremity Dressing  Assistance Level: Set-up,Modified independent  Skilled Clinical Factors: OH shirt and corset brace. Pt instruction in lumbar precautions to keep brace on until seated on shower chair, then keysha again before standing after bathing. Pt demos with good understanding and carryover. Lower Body Dressing     Lower Extremity Dressing  Equipment Provided: Reachers  Assistance Level: Contact guard assist  Skilled Clinical Factors: CGA threading LLE with brace donned. Putting On/Taking Off Footwear     Putting On/Taking Off Footwear  Assistance Level: Set-up (able to keysha slippers this AM before donnng lumbar brace. )  Skilled Clinical Factors: TA for TEDs. Pt requests assit donning L slipper this date. Toilet Transfer     Toileting  Assistance Level: Modified independent  Skilled Clinical Factors: SBA for safety in standing     Toileting Hygiene     Toilet Transfers  Technique:  (ambulating with RW)  Equipment: Raised toilet seat with arms  Assistance Level: Modified independent  Skilled Clinical Factors: good safety.         Short Term Goals  Time Frame for Short term goals: By 1 week  Short Term Goal 1: Pt will complete lower body dressing/bathing with SBA and Good safety while maintaining back precautions  Short Term Goal 2: Pt will complete functional transfers/mobility during self care tasks with SBA and Good safety  Short Term Goal 3: Pt will complete upper body dressing with SBA and Good safety and compliance of back precautions  Short Term Goal 4: Pt will tolerate standing 5+ minutes during functional activity of choice with Good safety  Short Term Goal 5: Pt will verbalize/demonstrate good understanding AE/adaptive strategies/DME to assist in maintaining back precautions to increase safety and independence with self care and mobility  Short Term Goal 6: Pt will participate in 30+ minutes of theraputic exercises/functional activites to increase safety and independence with self care and mobility          SPEECH THERAPY      NUTRITION  Weight: 180 lb 3.2 oz (81.7 kg) / Body mass index is 30.93 kg/m². Diet Rx: Regular. PO intake appears adequate. Please see nutrition note for details. SOCIAL WORK ASSESSMENT  Assessment t home/     Pre-Admission Status:  Lives With: Spouse  Type of Home: House  Home Layout: One level  Home Access: Stairs to enter without rails (Holds onto brick ledge and then the door frame)  Entrance Stairs - Number of Steps: 1+1+ledge at [place] Stairs - Rails:  (Has post to hold onto on the right side)  Bathroom Shower/Tub: Walk-in shower,Doors,Tub only (Drop in tub)  Bathroom Toilet: Handicap height  Bathroom Equipment: 3-in-1 commode (Has one suction bar which she can use if needed)  Bathroom Accessibility: Walker accessible  Home Equipment: Walker, rolling,Wheelchair-manual (RW is borrowed)  Has the patient had two or more falls in the past year or any fall with injury in the past year?: Yes  Receives Help From: Family  ADL Assistance: Independent  Homemaking Assistance: Needs assistance ( assists)  Homemaking Responsibilities: Yes  Ambulation Assistance: Independent  Transfer Assistance: Independent  Active : Yes  Mode of Transportation: SUV  Occupation: Retired  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 Sleeps in a regular bed at home. 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  Pt reports that her 2 sons and wives are able to assist as needed. Family Education: Family Education initiated and Ongoing    Percentage Risk for Readmission: Low 0 - 18%   Readmission Risk              Risk of Unplanned Readmission:  10       %    Critical Items: None        Problem / Barrier Intervention / Plan  Results   Impaired function related to recent back surgery and chronic weakness  RLE 2* hx of polio. Strengthening, functional mobility training with assistive device.     Altered ability to care for self Training and remediation in modified care strategies      Steps at entrance [address], progress to stair training, pt/family education                                           Total Self Care Score    Total Mobility Score  Admission Score:  22      Admission Score:  31  Goal:  42/42         Goal:  76/90   `  Discharge Plan   Estimated Discharge Date: 5/4/22  Home evaluation needed? Home Evaluation Indication (NO, Requires ReEval, YES/Date): No home evaluation need indicated for patient at this time  Overnight or Day Pass: No  Factors facilitating achievement of predicted outcomes: Family support, Motivated, Cooperative and Pleasant  Barriers to the achievement of predicted outcomes: Pain, Skin Care, Wound Care and Medication managment    Functional Goals at discharge:  Predicted Outcome: Home with familyPATIENT'S LEVEL OF ASSISTANCE: Modified independence  Discharge therapy goals:  PT: Long Term Goals  Time Frame for Long term goals : By DC  Long term goal 1: Pt able to perform supine<>sit mod-I  Long term goal 2: Pt able to transfer mod-I from various surfaces with rolling walker  Long term goal 3: Pt able to ambulte with rolling walker on leel as well as outside terraine at mod- I distnace of atleast 200 ft.   Long term goal 4: Pt to improve 2MW distance to 150ft to improve fucntion and gait speed  Long term goal 5: Pt able to go up and down 5 to 12 steps with 1 UE support, SBA  OT:Long Term Goals  Time Frame for Long term goals : By discharge  Long Term Goal 1: Pt will complete BADLs with modified independence and Good safety while maintaining back precautions  Long Term Goal 2: Pt will complete functional transfers/mobility during self care tasks with modified independence with Good safety  Long Term Goal 3: Pt will tolerate standing 10+ minutes during functional activity of choice with Good safety  Long Term Goal 4: Pt will complete simple meal prep/light house keeping task with Supervision and Good safety  Long Term Goal 5: Pt will verbalize/demonstrate Good understanding of home safety/fall prevention strategies to increase safety and independence with self care and mobility  Long Term Goal 6: Pt will demonstrate increased  strength during activities of daily living in R hand as evident by 5# improved  strength  ST:     Participating Team Members:  /:  Harry Wells RN  Occupational Therapist: Diana Alonzo OT    Physical Therapist: Ric Garcia PT  Speech Therapist:  N/A  Nurse: Yojana Corcoran RN    Dietary/Nutrition: Pema Smith RD, LD  Pastoral Care: Caleb Spencer  CMG: Jaqui Singh RN    I approve the established interdisciplinary plan of care as documented within the medical record of Minerva Damon.     Eliseo Jernigan MD

## 2022-05-02 NOTE — FLOWSHEET NOTE
05/02/22 1246   Encounter Summary   Encounter Overview/Reason  Volunteer Encounter   Service Provided For: Patient   Referral/Consult From: Rounding   Last Encounter  05/02/22  (V)   Complexity of Encounter Low   Spiritual/Emotional needs   Type Spiritual Support   Rituals, Rites and [address]

## 2022-05-02 NOTE — PLAN OF CARE
Problem: Discharge Planning  Goal: Discharge to home or other facility with appropriate resources  5/2/2022 1554 by David Willoughby RN  Outcome: Progressing     Problem: Safety - Adult  Goal: Free from fall injury  5/2/2022 1554 by David Willoughby RN  Outcome: Progressing     Problem: ABCDS Injury Assessment  Goal: Absence of physical injury  5/2/2022 1554 by David Willoughby RN  Outcome: Progressing     Problem: Pain  Goal: Verbalizes/displays adequate comfort level or baseline comfort level  5/2/2022 1554 by David Willoughby RN  Outcome: Progressing  Note: Patient's pain is well controlled with current regimen. See MAR. Problem: Skin/Tissue Integrity  Goal: Absence of new skin breakdown  Description: 1. Monitor for areas of redness and/or skin breakdown  2. Assess vascular access sites hourly  3. Every 4-6 hours minimum:  Change oxygen saturation probe site  4. Every 4-6 hours:  If on nasal continuous positive airway pressure, respiratory therapy assess nares and determine need for appliance change or resting period. 5/2/2022 1554 by David Willoughby RN  Outcome: Progressing  Note: No signs of increased skin or tissue breakdown is noted. See Head to Toe/LDA assessments in flowsheets.

## 2022-05-02 NOTE — PROGRESS NOTES
Physical Therapy  [facility]  Acute Rehabilitation Physical Therapy Progress Note    Date: 22  Patient Name: Marcelina Thakur       Room: 0988/0988-65  MRN: [id number]   Account: [de-identified]   : 1950  (66 y.o.) Gender: female     Referring Practitioner: Dionisio Nelson MD  Diagnosis: Debility  Past Medical History:  has a past medical history of Arthritis, Chronic back pain, GERD (gastroesophageal reflux disease), Hyperlipidemia, Hypertension, MVP (mitral valve prolapse), and Polio. Past Surgical History:   has a past surgical history that includes Hysterectomy; Hemorrhoid surgery (); Leg Surgery; back surgery; Colonoscopy; Breast surgery; and Tonsillectomy. Restrictions/Precautions  Restrictions/Precautions: Surgical Protocols; Fall Risk;General Precautions  Required Braces or Orthoses?: Yes  Implants present? : Metal implants (Back surgery, R ankle surgery)  Required Braces or Orthoses  Spinal: Lumbar Corset (donned when out of bed. )  Right Lower Extremity Brace: Ankle Foot Orthotics (Family brought in; Pt complains shoe doesn't fit well/AFO do)  Position Activity Restriction  Spinal Precautions: No Bending; No Lifting; No Twisting (strenuous activity  (sx 22))  Other position/activity restrictions: No lifting greater than 1-2 lbs for 14 days (sx 22)      Bed Mobility:   Bed Mobility  Scooting: Modified independent  Bed mobility  Supine to Sit: Modified independent  Sit to Supine: Modified independent  Scooting: Modified independent  Comment: Pt is Mod I in her room. To don back brace when out of bed. Transfers:  Sit to Stand: Modified independent  Stand to sit: Modified independent  Bed to Chair: Modified independent (with and without AD)              Ambulation  Surface: level tile  Device: Rolling Walker  Other Apparatus: Right;AFO (lumbar corset)  Assistance: Supervision;Modified Independent  Quality of Gait: gait with longer stance on L LE.  Improving fwd flexed posture, deteriorates over time/distance with fatigue/increased back pain with prolonged standing/movement. Gait Deviations: Decreased step length;Decreased step height  Distance: [id number] 139ft x 1  Comments: Pt wanting to see how she does amb without AFO this morning. Wants to be able to wear sandles. Pt ed on safety and advised her to wear brace, however realized once pt is home she will be making her own decisions. No LOB or catching of toes with amb without use of AFO. Second distance amb with wearing AFO        Stairs/Curb  Stairs?: Yes  Stairs  # Steps : 15 (practiced three 7.5 in steps in pm due to steps at home.)  Stairs Height:  (4\" and 6\")  Rails: Left ascending  Assistance: Supervision;Modified independent ;Minimal assistance (Min A (support on R) with 7.5in steps)  Comment: Step-to sequencing; Pt experienced increase SOB and dizziness post steps. Symptoms subsisded upon seated rest break  Pt came to pm therapy session with home access through garage and using front enterance. Worked on set up and best technique for pt. Reports spouse with attend tomorrows pm session as well for family training.  (Pt reports increase back pain w/6 and 7.5in steps)       BALANCE Posture: Good  Sitting - Static: Good (edge of mat, no back or UE support)  Sitting - Dynamic: Good (Edge of mat, no back or UE support)  Standing - Static: Good (briefly without UE support)  Standing - Dynamic: Good;- (rolling walker)  Comments: Standing with rolling walker       Activity Tolerance: Patient tolerated treatment well,Patient limited by pain  PT Equipment Recommendations  Walker: Rolling         Current Treatment Recommendations: Strengthening,Balance training,Functional mobility training,Transfer training,Endurance training,Gait training,Stair training,Safety education & training,Patient/Caregiver education & training,Equipment evaluation, education, & procurement    Conditions Requiring Skilled Therapeutic Intervention  Assessment: Presents with B LE weakness, R LE > L LE, also history of polio with R side weakness and R foot drop. Pt requires min A for mobility at this time with rolling walker. Needs skilled physical therapy to progress to PLOF. Treatment Diagnosis: Impaired mobility  Therapy Prognosis: Good  Decision Making: Medium Complexity  History: Hx of polio, R LE shorter than L LE  Discharge Recommendations: Home with assist PRN;Patient would benefit from continued therapy after discharge    Goals  Short Term Goals  Time Frame for Short term goals: 1 week  Short term goal 1: Pt able to roll in bed maintaingn Back precautions independently  Short term goal 2: Pt able to perfrom supien <>sit SBA  Short term goal 3: Pt ableto transfers SBA wiht rolling walker  Short term goal 4:  Pt able to ambulate 150 ft with rolling walker distance of 150 ft, SBA  Short term goal 5: Pt able to go up and down 5 steps with 2 UE support, min A  Long Term Goals  Time Frame for Long term goals : By DC  Long term goal 1: Pt able to perform supine<>sit mod-I  Long term goal 2: Pt able to transfer mod-I from various surfaces with rolling walker  Long term goal 3: Pt able to ambulte with rolling walker on leel as well as outside terraine at mod- I distnace of atleast 200 ft.   Long term goal 4: Pt to improve 2MW distance to 150ft to improve fucntion and gait speed  Long term goal 5: Pt able to go up and down 5 to 12 steps with 1 UE support, SBA       05/02/22 1004 05/02/22 1541   PT Individual Minutes   Time In 1000 1440   Time Out 1100 1510   Minutes 60 30       Electronically signed by Peg Reed PTA on 5/2/22 at 3:42 PM EDT

## 2022-05-02 NOTE — PLAN OF CARE
Problem: Discharge Planning  Goal: Discharge to home or other facility with appropriate resources  Outcome: Progressing     Problem: Safety - Adult  Goal: Free from fall injury  Outcome: Progressing     Problem: ABCDS Injury Assessment  Goal: Absence of physical injury  Outcome: Progressing     Problem: Pain  Goal: Verbalizes/displays adequate comfort level or baseline comfort level  5/2/2022 0448 by Sera Rojas RN  Outcome: Progressing     Problem: Skin/Tissue Integrity  Goal: Absence of new skin breakdown  Description: 1. Monitor for areas of redness and/or skin breakdown  2. Assess vascular access sites hourly  3. Every 4-6 hours minimum:  Change oxygen saturation probe site  4. Every 4-6 hours:  If on nasal continuous positive airway pressure, respiratory therapy assess nares and determine need for appliance change or resting period.   5/2/2022 0448 by Sera Rojas RN  Outcome: Progressing

## 2022-05-02 NOTE — PROGRESS NOTES
[facility] Internal Medicine    CONSULTATION / HISTORY AND PHYSICAL EXAMINATION            Date:   5/2/2022  Patient name:  Fox Parisi  Date of admission:  4/23/2022 12:09 PM  MRN:   [id number]  Account:  [de-identified]  YOB: 1950  PCP:    Saul Ortiz MD  Room:   [id number]  Code Status:    Full Code    Physician Requesting Consult: Macey Campbell MD    Reason for Consult: Medical management    Chief Complaint:     No chief complaint on file.     Debility    History Obtained From:     patient, electronic medical record    History of Present Illness/ Interval History:     s/p lumbar spine decompression for lumbar spine stenosis  48-year-old female with history of previous back surgeries had progressively worsening low back pain for several years.  Notes worsening with walking.  Weakness to lower extremities with increased activity.  She has tried aqua therapy.  Also history of polio with residual right foot drop.  She uses a walker to assist with ambulation.  She was noted to have spinal stenosis L4-L5 level.  Underwent L2-5 laminectomy and interbody fusion L4-5.  Expandable disc spacer interbody L4-5.  Postop developed hypoxia Placed on 3 L oxygen.  On heparin for DVT prophylaxis    Medical history includes hypertension hyperlipidemia GERD    HTN  Onset more than 2 years ago  Charity: mild to mod  Usually controlled with current po meds  Not associated with headaches or blurry vision  No chest pain  5/2   Patient clinically doing better  Back pain is under control  Working with PT  Cough, shortness of breath is improved, had recent chest x-ray    Past Medical History:     Past Medical History:   Diagnosis Date    Arthritis     Chronic back pain     ddd    GERD (gastroesophageal reflux disease)     Hyperlipidemia     Hypertension     MVP (mitral valve prolapse)     Polio     right leg at age 2        Past Surgical History:     Past Surgical History:   Procedure Laterality Date    BACK SURGERY      BREAST SURGERY      COLONOSCOPY      HEMORRHOID SURGERY  1988    HYSTERECTOMY      LEG SURGERY      fused right heel and right leg lengthened-due to polio    TONSILLECTOMY          Medications Prior to Admission:     Prior to Admission medications    Medication Sig Start Date End Date Taking? Authorizing Provider   alendronate (FOSAMAX) 70 MG tablet Take 1 tablet by mouth every 7 days 7/19/16   Lena Titus MD   hydrocortisone (ANUSOL-HC) 2.5 % rectal cream Place rectally 2 times daily. 5/31/16   Bear Roldan MD   simvastatin (ZOCOR) 20 MG tablet Take 1 tablet by mouth nightly 4/21/16   Lena Titus MD   losartan-hydrochlorothiazide [place]) 100-25 MG per tablet Take 1 tablet by mouth daily 4/21/16   Lena Titus MD   clobetasol (TEMOVATE) 0.05 % cream Pt uses twice weekly 9/24/15   Lena Titus MD   Fluocinolone Acetonide 0.01 % OIL Place 1 drop in ear(s) nightly 7/7/15   Bear Roldan MD   ibuprofen (ADVIL;MOTRIN) 800 MG tablet Take 800 mg by mouth every 8 hours as needed for Pain. 5/28/14 7/19/16  Bear Roldan MD   timolol (TIMOPTIC-XR) 0.5 % ophthalmic gel-forming Place 1 drop into both eyes daily. 9/9/12   Historical Provider, MD   Multiple Vitamins-Minerals (PRESERVISION/LUTEIN) CAPS Take 1 capsule by mouth daily. Historical Provider, MD   Omeprazole-Sodium Bicarbonate (ZEGERID OTC)  MG CAPS Take  by mouth daily. Historical Provider, MD        Allergies:     Crestor [rosuvastatin], Darvocet-n 100 [propoxyphene n-acetaminophen], Equagesic, Lipitor, and Relafen [nabumetone]    Social History:     Tobacco:    reports that she has never smoked. She has never used smokeless tobacco.  Alcohol:      reports no history of alcohol use. Drug Use:  reports no history of drug use.     Family History:     Family History   Problem Relation Age of Onset    Cancer Mother         breast    Heart Disease Father        Review of Systems:     Positive and Negative as described in HPI. Denies any shortness of breath or cough  Denies chest pain or palpitations  Denies abdominal pain, diarrhea vomiting  Denies any new numbness tremors or weakness. Physical Exam:     /85   Pulse 77   Temp 97.7 °F (36.5 °C)   Resp 16   Ht 5' 4\" (1.626 m)   Wt 180 lb 3.2 oz (81.7 kg)   SpO2 98%   BMI 30.93 kg/m²   Temp (24hrs), Av.1 °F (36.7 °C), Min:97.7 °F (36.5 °C), Max:98.4 °F (36.9 °C)      General appearance:  alert, cooperative and no distress  Eyes: Anicteric sclera. Pupils are equally round and reactive to light. Extraocular movements are intact.   Lungs:  clear to auscultation bilaterally, normal effort  Heart:  regular rate and rhythm, no murmur  Abdomen:  soft, nontender, nondistended, normal bowel sounds, no masses, hepatomegaly, splenomegaly  Extremities:  no edema, redness, tenderness in the calves  Skin:  no gross lesions, rashes, induration  Neuro:  Alert, oriented X 3, no new focal weakness    Investigations:      Laboratory Testing:  Lab Results   Component Value Date    WBC 11.2 (H) 2022    HGB 12.2 2022    HCT 36.3 2022    MCV 90.3 2022     (H) 2022     Lab Results   Component Value Date     2022    K 3.9 2022     2022    CO2 22 2022    BUN 23 2022    CREATININE 1.27 2022    GLUCOSE 123 2022    CALCIUM 9.7 2022         Assessment :      Primary Problem  Debility    Active Hospital Problems    Diagnosis Date Noted    Non-intractable vomiting with nausea [R11.2] 2022     Priority: Medium    Status post lumbar spine surgery for decompression of spinal cord [Z98.890] 2022     Priority: Medium    Debility [R53.81] 2022     Priority: Medium    HTN (hypertension) [I10] 2012    Hypertriglyceridemia [E78.1] 2012       Plan:     Principal Problem:    Debility  Active Problems:    Non-intractable vomiting with nausea    Status post lumbar spine surgery for decompression of spinal cord    HTN (hypertension)    Hypertriglyceridemia  Resolved Problems:    * No resolved hospital problems. *        1. Status post lumbar spine surgery  2. Hypertension continue amlodipine, currently controlled  3. Hyperlipidemia-continue statin  4. GERD-continue Protonix    Cough ,cxr negative  No flonase  Not on ace or arbs  On albuterol  Will watch for now  5/2   Hypertension, controlled  Is CKD, which is stable, not on NSAIDs  Back pain is improved          Consultations:   Saw Marte MD  5/2/2022  5:08 PM    Copy sent to Dr. Catracho Vergara MD    Please note that this chart was generated using voice recognition Dragon dictation software. Although every effort was made to ensure the accuracy of this automated transcription, some errors in transcription may have occurred.

## 2022-05-02 NOTE — PROGRESS NOTES
Occupational Therapy  Facility/Department: [facility]  Rehabilitation Occupational Therapy Daily Treatment Note    Date: 22  Patient Name: Sarah Cesar       Room: 4391/3224-03  MRN: [id number]  Account: [de-identified]   : 1950  (72 y.o.) Gender: female                    Past Medical History:  has a past medical history of Arthritis, Chronic back pain, GERD (gastroesophageal reflux disease), Hyperlipidemia, Hypertension, MVP (mitral valve prolapse), and Polio. Past Surgical History:   has a past surgical history that includes Hysterectomy; Hemorrhoid surgery (); Leg Surgery; back surgery; Colonoscopy; Breast surgery; and Tonsillectomy. Restrictions  Restrictions/Precautions: Surgical Protocols; Fall Risk;General Precautions  Implants present? : Metal implants (Back surgery, R ankle surgery)  Other position/activity restrictions: No lifting greater than 1-2 lbs for 14 days (sx 22)  Required Braces or Orthoses  Spinal: Lumbar Corset (donned when out of bed. )  Right Lower Extremity Brace: Ankle Foot Orthotics (Family brought in; Pt complains shoe doesn't fit well/AFO do)  Required Braces or Orthoses?: Yes    Subjective  Subjective: \"I'm doing good today\"  Restrictions/Precautions: Surgical Protocols; Fall Risk;General Precautions        Pain Assessment  Pain Assessment: None - Denies Pain  Pain Level: 1  Patient's Stated Pain Goal: 3  Pain Location: Back  Pain Orientation: Lower  Pain Descriptors: Aching    Objective     Cognition  Overall Cognitive Status: WFL  Orientation  Overall Orientation Status: Within Functional Limits         ADL  Feeding  Assistance Level: Modified independent  Skilled Clinical Factors: per pt report    Grooming/Oral Hygiene  Assistance Level: Modified independent  Skilled Clinical Factors: standing at sink    Upper Extremity Bathing  Equipment Provided: Long-handled sponge  Assistance Level: Modified independent  Skilled Clinical Factors: completed seated on tub bench    Lower Extremity Bathing  Equipment Provided: Long-handled sponge  Assistance Level: Modified independent  Skilled Clinical Factors: good carryover to complete seated on bench    Upper Extremity Dressing  Assistance Level: Set-up; Modified independent (s/u for handing pt corset brace after showering)  Skilled Clinical Factors: OH shirt and corset brace. Pt instruction in lumbar precautions to keep brace on until seated on shower chair, then keysha again before standing after bathing. Pt demos with good understanding and carryover. Lower Extremity Dressing  Assistance Level: Supervision  Skilled Clinical Factors: VC to have UE support available when standing to pull pants over hips. Putting On/Taking Off Footwear  Assistance Level: Maximum assistance  Skilled Clinical Factors: TA for TEDs. Pt requests assit donning L slipper this date. Toileting  Assistance Level: Modified independent  Skilled Clinical Factors: SBA for safety in standing  Toilet Transfers  Technique:  (ambulating with RW)  Equipment: Raised toilet seat with arms  Assistance Level: Modified independent  Skilled Clinical Factors: good safety. Tub/Shower Transfers  Type: Shower  Transfer From: Rolling walker  Transfer To: Tub transfer bench  Assistance Level: Modified independent  Skilled Clinical Factors: steady. no LOB. Pt education provided on safety and precuations recommending a shower chair for use at home in order to best maintain back precuations and only remove brace during while seated for hygiene. Functional Mobility  Device: Rolling walker  Activity: Retrieve items;Transport items; To/From bathroom  Assistance Level: Modified independent  Skilled Clinical Factors: Pt ambulating around room to gather clothes from closet. Taking few side steps in bahtroom without RW. Steady, no LOB.    Sit to Stand  Assistance Level: Modified independent  Skilled Clinical Factors: Cued for hand placement for safety  Stand to Sit  Assistance Level: Modified independent  Skilled Clinical Factors: Cued for hand placement for safety     OT Exercises  Resistive Exercises: NEAL facilitated pt in use of hand gripper for facilitation of increased  strength for ease with ADLs, 3rd setting, X15 reps each. Next pt instruction in BUE distal ex's with 1# weight X15 reps. VC for pacing. Pt toleratates well. PM: pt instruction in tabletop task to play card game with 1# wrist weights donned for facilitation of increased strength and activity tolerance. Pt tolerates well. Assessment  Assessment  Activity Tolerance: Patient tolerated treatment well;Patient limited by pain  Discharge Recommendations: Home with assist PRN;Patient would benefit from continued therapy after discharge  Safety Devices  Safety Devices in place: Yes  Type of devices: Left in chair;Call light within reach    Patient Education  Education  Education Given To: Patient  Education Provided: Safety;Precautions; ADL Function;IADL Function; Energy Conservation;Plan of Care  Education Method: Verbal  Barriers to Learning: None  Education Outcome: Verbalized understanding;Demonstrated understanding    Plan  Plan  Times per Week: 5-7  Times per Day: Twice a day  Current Treatment Recommendations: Self-Care / ADL; Strengthening;ROM;Balance training;Functional mobility training; Endurance training;Pain management; Safety education & training;Patient/Caregiver education & training;Equipment evaluation, education, & procurement;Home management training    Goals  Patient Goals   Patient goals : Kavin Cheng now just get back to normal and hope that my pain is gone from my back. \"  Short Term Goals  Time Frame for Short term goals: By 1 week  Short Term Goal 1: Pt will complete lower body dressing/bathing with SBA and Good safety while maintaining back precautions  Short Term Goal 2: Pt will complete functional transfers/mobility during self care tasks with SBA and Good safety  Short Term Goal 3: Pt will complete upper body dressing with SBA and Good safety and compliance of back precautions  Short Term Goal 4: Pt will tolerate standing 5+ minutes during functional activity of choice with Good safety  Short Term Goal 5: Pt will verbalize/demonstrate good understanding AE/adaptive strategies/DME to assist in maintaining back precautions to increase safety and independence with self care and mobility  Short Term Goal 6: Pt will participate in 30+ minutes of theraputic exercises/functional activites to increase safety and independence with self care and mobility  Long Term Goals  Time Frame for Long term goals : By discharge  Long Term Goal 1: Pt will complete BADLs with modified independence and Good safety while maintaining back precautions  Long Term Goal 2: Pt will complete functional transfers/mobility during self care tasks with modified independence with Good safety  Long Term Goal 3: Pt will tolerate standing 10+ minutes during functional activity of choice with Good safety  Long Term Goal 4: Pt will complete simple meal prep/light house keeping task with Supervision and Good safety  Long Term Goal 5: Pt will verbalize/demonstrate Good understanding of home safety/fall prevention strategies to increase safety and independence with self care and mobility  Long Term Goal 6: Pt will demonstrate increased  strength during activities of daily living in R hand as evident by 5# improved  strength        05/02/22 1557 05/02/22 1558   OT Individual Minutes   Time In 0806 1304   Time Out 0903 1339   Minutes 57 35     ÁNGEL Au/TY

## 2022-05-03 PROCEDURE — 6360000002 HC RX W HCPCS: Performed by: PHYSICAL MEDICINE & REHABILITATION

## 2022-05-03 PROCEDURE — 99232 SBSQ HOSP IP/OBS MODERATE 35: CPT | Performed by: INTERNAL MEDICINE

## 2022-05-03 PROCEDURE — 6370000000 HC RX 637 (ALT 250 FOR IP): Performed by: PHYSICAL MEDICINE & REHABILITATION

## 2022-05-03 PROCEDURE — 97116 GAIT TRAINING THERAPY: CPT

## 2022-05-03 PROCEDURE — 99232 SBSQ HOSP IP/OBS MODERATE 35: CPT | Performed by: STUDENT IN AN ORGANIZED HEALTH CARE EDUCATION/TRAINING PROGRAM

## 2022-05-03 PROCEDURE — 1180000000 HC REHAB R&B

## 2022-05-03 PROCEDURE — 97535 SELF CARE MNGMENT TRAINING: CPT

## 2022-05-03 PROCEDURE — 97530 THERAPEUTIC ACTIVITIES: CPT

## 2022-05-03 PROCEDURE — 97110 THERAPEUTIC EXERCISES: CPT

## 2022-05-03 RX ADMIN — FLUTICASONE PROPIONATE 2 SPRAY: 50 SPRAY, METERED NASAL at 09:24

## 2022-05-03 RX ADMIN — AMLODIPINE BESYLATE 10 MG: 10 TABLET ORAL at 09:17

## 2022-05-03 RX ADMIN — HEPARIN SODIUM 5000 UNITS: 5000 INJECTION INTRAVENOUS; SUBCUTANEOUS at 13:50

## 2022-05-03 RX ADMIN — LATANOPROST 1 DROP: 50 SOLUTION OPHTHALMIC at 21:22

## 2022-05-03 RX ADMIN — TIZANIDINE 4 MG: 4 TABLET ORAL at 21:23

## 2022-05-03 RX ADMIN — LATANOPROST 1 DROP: 50 SOLUTION OPHTHALMIC at 09:23

## 2022-05-03 RX ADMIN — HEPARIN SODIUM 5000 UNITS: 5000 INJECTION INTRAVENOUS; SUBCUTANEOUS at 05:58

## 2022-05-03 RX ADMIN — PANTOPRAZOLE SODIUM 40 MG: 40 TABLET, DELAYED RELEASE ORAL at 05:58

## 2022-05-03 RX ADMIN — TIZANIDINE 4 MG: 4 TABLET ORAL at 09:17

## 2022-05-03 RX ADMIN — TRAMADOL HYDROCHLORIDE 50 MG: 50 TABLET, COATED ORAL at 21:23

## 2022-05-03 RX ADMIN — ROSUVASTATIN CALCIUM 5 MG: 10 TABLET, FILM COATED ORAL at 21:23

## 2022-05-03 RX ADMIN — DORZOLAMIDE HYDROCHLORIDE AND TIMOLOL MALEATE 1 DROP: 20; 5 SOLUTION/ DROPS OPHTHALMIC at 09:34

## 2022-05-03 RX ADMIN — MONTELUKAST SODIUM 10 MG: 10 TABLET, FILM COATED ORAL at 09:17

## 2022-05-03 RX ADMIN — HEPARIN SODIUM 5000 UNITS: 5000 INJECTION INTRAVENOUS; SUBCUTANEOUS at 21:23

## 2022-05-03 ASSESSMENT — PAIN DESCRIPTION - PAIN TYPE
TYPE: ACUTE PAIN
TYPE: ACUTE PAIN
TYPE: CHRONIC PAIN

## 2022-05-03 ASSESSMENT — PAIN SCALES - WONG BAKER

## 2022-05-03 ASSESSMENT — PAIN SCALES - GENERAL
PAINLEVEL_OUTOF10: 7
PAINLEVEL_OUTOF10: 8
PAINLEVEL_OUTOF10: 8
PAINLEVEL_OUTOF10: 0

## 2022-05-03 ASSESSMENT — PAIN DESCRIPTION - FREQUENCY
FREQUENCY: INTERMITTENT
FREQUENCY: INTERMITTENT

## 2022-05-03 ASSESSMENT — PAIN DESCRIPTION - ORIENTATION
ORIENTATION: RIGHT;LEFT;LOWER
ORIENTATION: MID
ORIENTATION: RIGHT;LEFT

## 2022-05-03 ASSESSMENT — PAIN - FUNCTIONAL ASSESSMENT
PAIN_FUNCTIONAL_ASSESSMENT: ACTIVITIES ARE NOT PREVENTED
PAIN_FUNCTIONAL_ASSESSMENT: ACTIVITIES ARE NOT PREVENTED

## 2022-05-03 ASSESSMENT — PAIN DESCRIPTION - DESCRIPTORS
DESCRIPTORS: ACHING;HEAVINESS;NAGGING
DESCRIPTORS: ACHING;POUNDING

## 2022-05-03 ASSESSMENT — PAIN DESCRIPTION - LOCATION
LOCATION: BACK
LOCATION: BACK

## 2022-05-03 NOTE — PROGRESS NOTES
Physical Medicine & Rehabilitation  Progress Note      Subjective:      Nancy Cedeño is a 66 y.o. female with lumbar stenosis s/p L2-L5 laminectomy and L4-L5 fusion. She reports doing well today. She states that she tried tizanidine for muscle spasms last night and that she slept very well. She is looking forward to going home tomorrow. She denies any acute concerns. Confirmed plan for outpatient physical therapy with team and patient. ROS:  Denies fevers, chills, sweats. No chest pain, palpitations, lightheadedness. Denies wheezing or shortness of breath. Denies abdominal pain, nausea, diarrhea or constipation. No new areas of joint pain. Denies new areas of numbness or weakness. Denies new anxiety or depression issues. No new skin problems. Rehabilitation:   Progressing in therapies. PT:  Restrictions/Precautions: Surgical Protocols,Fall Risk,General Precautions  Implants present? : Metal implants (Back surgery, R ankle surgery)  Other position/activity restrictions: No lifting greater than 1-2 lbs for 14 days (sx 4/18/22)  Required Braces or Orthoses  Spinal: Lumbar Corset (donned when out of bed. )  Right Lower Extremity Brace: Ankle Foot Orthotics (Family brought in; Pt complains shoe doesn't fit well/AFO do)   Transfers  Sit to Stand: Modified independent  Stand to sit: Modified independent  Bed to Chair: Modified independent (with and without AD)  Stand Pivot Transfers: Modified independent (with and without AD)  Comment: Rolling walker, unless noted otherwise. Ambulation  Surface: level tile  Device: Rolling Walker  Other Apparatus: Right,AFO (lumbar corset)  Assistance: Modified Independent  Quality of Gait: gait with longer stance on L LE. Improving fwd flexed posture, deteriorates over time/distance with fatigue/increased back pain with prolonged standing/movement.    Gait Deviations: Decreased step length,Decreased step height  Distance: 200', 191'=2 min. walk test  Comments: No loss of balance. Transfers  Sit to Stand: Modified independent  Stand to sit: Modified independent  Bed to Chair: Modified independent (with and without AD)  Stand Pivot Transfers: Modified independent (with and without AD)  Comment: Rolling walker, unless noted otherwise. Ambulation  Surface: level tile  Device: Rolling Walker  Other Apparatus: Right,AFO (lumbar corset)  Assistance: Modified Independent  Quality of Gait: gait with longer stance on L LE. Improving fwd flexed posture, deteriorates over time/distance with fatigue/increased back pain with prolonged standing/movement. Gait Deviations: Decreased step length,Decreased step height  Distance: 200', 191'=2 min. walk test  Comments: No loss of balance. Surface: level tile  Ambulation  Surface: level tile  Device: Rolling Walker  Other Apparatus: Right,AFO (lumbar corset)  Assistance: Modified Independent  Quality of Gait: gait with longer stance on L LE. Improving fwd flexed posture, deteriorates over time/distance with fatigue/increased back pain with prolonged standing/movement. Gait Deviations: Decreased step length,Decreased step height  Distance: 200', 191'=2 min. walk test  Comments: No loss of balance. OT:  ADL  Feeding: Modified independent   Feeding Skilled Clinical Factors: per pt report with increased time  Grooming: Stand by assistance  Grooming Skilled Clinical Factors: sitting EOB  UE Bathing: Stand by assistance  UE Bathing Skilled Clinical Factors: Sitting on tub bench with verbal cues for back precautions  LE Bathing: Moderate assistance  LE Bathing Skilled Clinical Factors: A with bilateral lower legs/feet and bottom  UE Dressing:  Moderate assistance  UE Dressing Skilled Clinical Factors: A with doffing/donning back brace  LE Dressing: Maximum assistance  LE Dressing Skilled Clinical Factors: A with doffing/donning bilateral socks and A with threading LLE with increased time and verbal cues for back precautions  Toileting: Minimal assistance  Toileting Skilled Clinical Factors: A for bottom hygiene  Additional Comments: OT facilitated pts engagement in full shower on this date with use of tub bench, grab bars, and hand held shower head. Lunbar corset doffed while sitting on tub bench. Pt required A with all lower body self care tasks at this time. Pt currently limited due to decerased strength, balance, activity tolerance, nausea, and pain limiting safety and independence with self care tasls. PM: OT introduced patient to AE (reacher, sock aid, and long handle sponge) to be used during self care tasks during next session. Instrumental ADL's  Instrumental ADLs: Yes     Balance  Sitting Balance: Stand by assistance  Standing Balance: Minimal assistance (Min A - CGA)   Standing Balance  Time: 1-2 minutes x 4  Activity: Functional transfers/mobility, self care tasks  Comment: Verbal cues for hand placement and safety  Functional Mobility  Functional - Mobility Device: Rolling Walker  Activity: To/from bathroom  Assist Level: Minimal assistance (min A - CGA)     Bed mobility  Bridging: Stand by assistance  Rolling to Left: Supervision  Rolling to Right: Supervision  Supine to Sit: Modified independent  Sit to Supine: Modified independent  Scooting: Modified independent (hips laterally on mat)  Comment: Mat, 2 pillows; HOB on Pt's R. Transfers  Sit to stand: Minimal assistance (min A -CGA)  Stand to sit: Contact guard assistance  Transfer Comments: Verbal cues for hand placement and safety   Toilet Transfers  Toilet - Technique: Ambulating  Equipment Used: Raised toilet seat with rails  Toilet Transfer: Minimal assistance  Toilet Transfers Comments: Verbal cues for hand placement and safety     Shower Transfers  Shower - Transfer From: Walker  Shower - Transfer Type: To and From  Shower - Transfer To:  Transfer tub bench  Shower - Technique: Ambulating  Shower Transfers: Minimal assistance  Shower Transfers Comments: Verbal cues for safety over threshold       SPEECH:      Current Medications:   Current Facility-Administered Medications: fluticasone (FLONASE) 50 MCG/ACT nasal spray 2 spray, 2 spray, Each Nostril, Daily  albuterol sulfate  (90 Base) MCG/ACT inhaler 2 puff, 2 puff, Inhalation, Q6H PRN  latanoprost (XALATAN) 0.005 % ophthalmic solution 1 drop, 1 drop, Both Eyes, Nightly  ondansetron (ZOFRAN) tablet 4 mg, 4 mg, Oral, Q8H PRN  acetaminophen (TYLENOL) tablet 650 mg, 650 mg, Oral, Q4H PRN  polyethylene glycol (GLYCOLAX) packet 17 g, 17 g, Oral, Daily  senna (SENOKOT) tablet 17.2 mg, 2 tablet, Oral, Daily PRN  bisacodyl (DULCOLAX) suppository 10 mg, 10 mg, Rectal, Daily PRN  amLODIPine (NORVASC) tablet 10 mg, 10 mg, Oral, Daily  dorzolamide-timolol (COSOPT) 22.3-6.8 MG/ML ophthalmic solution 1 drop, 1 drop, Both Eyes, BID  montelukast (SINGULAIR) tablet 10 mg, 10 mg, Oral, Daily  mupirocin (BACTROBAN) 2 % ointment, , Topical, BID  rosuvastatin (CRESTOR) tablet 5 mg, 5 mg, Oral, Nightly  tiZANidine (ZANAFLEX) tablet 4 mg, 4 mg, Oral, Q6H PRN  traMADol (ULTRAM) tablet 50 mg, 50 mg, Oral, Q6H PRN  vitamin D (ERGOCALCIFEROL) capsule 50,000 Units, 50,000 Units, Oral, Weekly  heparin (porcine) injection 5,000 Units, 5,000 Units, SubCUTAneous, 3 times per day  pantoprazole (PROTONIX) tablet 40 mg, 40 mg, Oral, QAM AC      Objective:  BP (!) 156/72   Pulse 84   Temp 98.8 °F (37.1 °C) (Oral)   Resp 18   Ht 5' 4\" (1.626 m)   Wt 180 lb 3.2 oz (81.7 kg)   SpO2 99%   BMI 30.93 kg/m²       GEN: Well developed, well nourished, no acute distress  HEENT: NCAT. EOM grossly intact. Hearing grossly intact. Mucous membranes pink and moist.  RESP: Normal breath sounds with no wheezing, rales, or rhonchi. Respirations WNL and unlabored. CV: Regular rate and rhythm. No murmurs, rubs, or gallops. ABD: Soft, non-distended, BS+ and equal.  NEURO: Alert. Speech fluent.   Sensation to light touch slightly decreased in left foot compared to right. MSK:  Muscle tone and bulk are normal bilaterally. Moving the bilateral upper limbs with at least antigravity strength. Strength 4+/5 with left ankle dorsiflexion and plantarflexion. LIMBS: No edema in bilateral lower limbs. SKIN: Warm and dry with good turgor. PSYCH: Mood WNL. Affect WNL. Appropriately interactive. Diagnostics:     CBC:   Recent Labs     05/01/22 0622   WBC 11.2*   RBC 4.01   HGB 12.2   HCT 36.3   MCV 90.3   RDW 14.7   *     BMP:   Recent Labs     05/01/22 0622      K 3.9      CO2 22   BUN 23   CREATININE 1.27*   GLUCOSE 123*     BNP: No results for input(s): BNP in the last 72 hours. PT/INR: No results for input(s): PROTIME, INR in the last 72 hours. APTT: No results for input(s): APTT in the last 72 hours. CARDIAC ENZYMES: No results for input(s): CKMB, CKMBINDEX, TROPONINT in the last 72 hours. Invalid input(s): CKTOTAL;3  FASTING LIPID PANEL:  Lab Results   Component Value Date    CHOL 223 (H) 07/19/2016    HDL 38 (L) 07/17/2017    TRIG 314 (H) 07/19/2016     LIVER PROFILE: No results for input(s): AST, ALT, ALB, BILIDIR, BILITOT, ALKPHOS in the last 72 hours. Impression/Plan:   Impaired ADLs, gait, and mobility due to:    1. Debility after lumbar laminectomy and fusion for stenosis:  Performed by Dr. Michael Yeager on 4/18/22. PT/OT  for gait, mobility, strengthening, endurance, ADLs, and self care. Has Ultram, Tylenol, and Zanaflex as needed. 2. Acute Respiratory Failure: Post-op. Improved. Has albuterol as needed and Singulair - refusing. 3. Allergic rhinitis:  On flonase daily. Monitoring. 4. History of polio with right foot drop: History of right heel and leg lengthening. Has right AFO. 5. HTN:  On amlodipine  6. HLD: On crestor  7. Nausea/vomiting: Started on pantoprazole per IM. 8. Vitamin D deficiency: On weekly repletion  9. Glaucoma: Cosopt - may use home supply of preservative free, latanoprost nightly  10.  Bowel Management: Miralax daily, Senokot prn, Dulcolax prn   11. Internal medicine for medical management  12. DVT prophylaxis:  On heparin, SCDs and NATHAN stockings  13. Discussed plan for outpatient physical therapy with patient and team  14.  Follow up PCP 1-2 weeks, Dr. Kristian Germain 5/31/22      Electronically signed by Criselda Herrera MD on 5/4/2022 at 12:20 AM

## 2022-05-03 NOTE — PROGRESS NOTES
Physical Therapy  Facility/Department: [facility]  Rehabilitation Physical Therapy Initial Assessment    NAME: Laura Knowles  : 1950 (66 y.o.)  MRN: [id number]  CODE STATUS: Full Code  Date of Service: 5/3/22    Chart Reviewed: Yes  Family / Caregiver Present: Yes ( Zeferino Brood)  Referring Practitioner: Dr Clinton Red  Comments: Pt's  present for family training regarding stairs; home entrances have (front with porch) a 3.5\" step and two 7.5\" steps with a porch between; the first 2 steps have a flat support (not grippable). The garage entrace also has 3 steps, 5\", 6\" and 7\" with a cabinet next to it for support; Pt pulls on door frame to manage last step. Educated Pt on using walker on porch like a curb ascent, same for last step from garage. Pt,  voicing confidence in ability to get into home with techniques demonstrated and used and  providing additional support/assistance PRN. Restrictions:  Restrictions/Precautions: Surgical Protocols; Fall Risk;General Precautions  Required Braces or Orthoses  Spinal: Lumbar Corset (donned when out of bed. )  Right Lower Extremity Brace: Ankle Foot Orthotics (Family brought in; Pt complains shoe doesn't fit well/AFO do)  Position Activity Restriction  Spinal Precautions: No Bending; No Lifting; No Twisting (strenuous activity  (sx 22))  Other position/activity restrictions: No lifting greater than 1-2 lbs for 14 days (sx 22)     SUBJECTIVE  Subjective: Pt states she took a muscle relaxer last night and slept very well as a result; took another today. OBJECTIVE  Functional Mobility  Bed mobility  Bridging: Stand by assistance  Rolling to Left: Supervision  Rolling to Right: Supervision  Supine to Sit: Modified independent  Sit to Supine: Modified independent  Scooting: Modified independent (hips laterally on mat)  Comment: Mat, 2 pillows; HOB on Pt's R.    Transfers  Sit to Stand: Modified independent  Stand to sit: Modified independent  Bed to Chair: Modified independent (with and without AD)  Stand Pivot Transfers: Modified independent (with and without AD)  Comment: Rolling walker, unless noted otherwise. Balance  Posture: Good  Sitting - Static: Good (edge of mat, no back or UE support)  Sitting - Dynamic: Good (Edge of mat, no back or UE support)  Standing - Static: Good (briefly without UE support)  Standing - Dynamic: Good;- (rolling walker)  Comments: Standing with rolling walker    Environmental Mobility  Ambulation  Surface: level tile  Device: Rolling Walker  Other Apparatus: Right;AFO (lumbar corset)  Assistance: Modified Independent  Quality of Gait: gait with longer stance on L LE. Improving fwd flexed posture, deteriorates over time/distance with fatigue/increased back pain with prolonged standing/movement. Gait Deviations: Decreased step length;Decreased step height  Distance: 200', 191'=2 min. walk test  Comments: No loss of balance. Ambulation 2  Surface - 2: ramp  Device 2: Rolling Walker  Other Apparatus 2: Right;AFO (lumbar corset)  Assistance 2: Supervision  Quality of Gait 2: As above. Gait Deviations: Decreased step length;Decreased step height  Distance: 30'  Comments: Short of breath after. Required <1 min. seated rest to recover to her satisfaction. Stairs/Curb  Stairs?: Yes     05/03/22 0824   Stairs   # Steps  10  (+ 3x8\" curb step with rolling walker for home env. training)   Stairs Height   (4\" and 6\")   Rails Left ascending   Assistance Supervision;Modified independent ;Minimal assistance  (Min A (support on R) with 7.5in steps)   Comment Step-to sequencing; practiced variety of techniques to ease home entry, including lateral ascent with single rail/bilateral UE support; forward ascent/descent with 1 UE support on L HR; curb steps with walker; bilateral UE support + forward ascent/descent.  Pt reports increased pain with lateral ascent with R LE leading/forward ascent, both on 6\" steps, and curb step techniques, though Pt gained confidence with repetition of curb step.  providing hands-on assist on curb step (writer [id number] for safety), observing other techniques. Both expressing confidence in ability to gain entry to house with any technique they feel appropriate at the time. PT Exercises  A/AROM Exercises: Supine & sidelying bilateral LE exercises, 15x each  Exercise Equipment: NuStep, workload 3, 15 mins   (LE's only)    ASSESSMENT  Activity Tolerance  Activity Tolerance: Patient tolerated treatment well;Patient limited by pain    Assessment  Performance Deficits/Impairments: Decreased functional mobility ; Decreased body mechanics; Decreased tolerance to work activity; Decreased strength;Decreased safe awareness;Decreased endurance;Decreased balance; Increased pain;Decreased posture  Treatment Diagnosis: Impaired mobility  History: Hx of polio, R LE shorter than L LE  Discharge Recommendations: Home with assist PRN;Patient would benefit from continued therapy after discharge  PT D/C Equipment  Equipment Needed: Yes  [place]: Rolling (ordered 5/2/22)  PT Equipment Recommendations  [place]: Rolling (ordered 5/2/22)    GOALS  Patient Goals   Patient goals : Get stronger  Short Term Goals  Time Frame for Short term goals: 1 week  Short term goal 1: Pt able to roll in bed maintaingn Back precautions independently  Short term goal 2: Pt able to perfrom supien <>sit SBA  Short term goal 3: Pt ableto transfers SBA wiht rolling walker  Short term goal 4:  Pt able to ambulate 150 ft with rolling walker distance of 150 ft, SBA  Short term goal 5: Pt able to go up and down 5 steps with 2 UE support, min A  Long Term Goals  Time Frame for Long term goals : By DC  Long term goal 1: Pt able to perform supine<>sit mod-I  Long term goal 2: Pt able to transfer mod-I from various surfaces with rolling walker  Long term goal 3: Pt able to ambulte with rolling walker on leel as well as outside terraine at mod- I distnace of atleast 200 ft. Long term goal 4: Pt to improve 2MW distance to 150ft to improve fucntion and gait speed  Long term goal 5: Pt able to go up and down 5 to 12 steps with 1 UE support, SBA       05/03/22 0824   Education   Education Given To Patient; Family   Education Provided Mobility Training;Equipment   Education Provided Comments In addition to stair training, Pt demonstrated for  technique to don/doff lumbar corset,  verbalizing techniques Pt is performing. Education Method Demonstration;Verbal;Teach Back   Barriers to Learning Hearing   Education Outcome Verbalized understanding;Demonstrated understanding     PLAN OF CARE  Plan  Plan:  minutes of therapy at least 5 out of 7 days a week  Current Treatment Recommendations: Strengthening;Balance training;Functional mobility training;Transfer training; Endurance training;Gait training;Stair training; Safety education & training;Patient/Caregiver education & training;Equipment evaluation, education, & procurement  Safety Devices  Type of Devices:  All fall risk precautions in place;Gait belt;Patient at risk for falls    Therapy Time     05/03/22 0824 05/03/22 1400   PT Individual Minutes   Time In 1000 1442   Time Out 1102 1515   Minutes [address], [facility], 05/03/22 at 5:42 PM

## 2022-05-03 NOTE — PROGRESS NOTES
[facility]    CONSULTATION / HISTORY AND PHYSICAL EXAMINATION            Date:   5/3/2022  Patient name:  Jacky Gonzales  Date of admission:  4/23/2022 12:09 PM  MRN:   [id number]  Account:  [de-identified]  YOB: 1950  PCP:    Tae Melton MD  Room:   [facility]-  Code Status:    Full Code    Physician Requesting Consult: Promise Puri MD    Reason for Consult: Medical management    Chief Complaint:     No chief complaint on file.     Debility    History Obtained From:     patient, electronic medical record    History of Present Illness/ Interval History:     s/p lumbar spine decompression for lumbar spine stenosis  76-year-old female with history of previous back surgeries had progressively worsening low back pain for several years.  Notes worsening with walking.  Weakness to lower extremities with increased activity.  She has tried aqua therapy.  Also history of polio with residual right foot drop.  She uses a walker to assist with ambulation.  She was noted to have spinal stenosis L4-L5 level.  Underwent L2-5 laminectomy and interbody fusion L4-5.  Expandable disc spacer interbody L4-5.  Postop developed hypoxia Placed on 3 L oxygen.  On heparin for DVT prophylaxis    Medical history includes hypertension hyperlipidemia GERD    HTN  Onset more than 2 years ago  Charity: mild to mod  Usually controlled with current po meds  Not associated with headaches or blurry vision  No chest pain  5/2   Patient clinically doing better  Back pain is under control  Working with PT  Cough, shortness of breath is improved, had recent chest x-ray    Past Medical History:     Past Medical History:   Diagnosis Date    Arthritis     Chronic back pain     ddd    GERD (gastroesophageal reflux disease)     Hyperlipidemia     Hypertension     MVP (mitral valve prolapse)     Polio     right leg at age 2        Past Surgical History:     Past Surgical History:   Procedure Laterality Date    BACK SURGERY      BREAST SURGERY      COLONOSCOPY      HEMORRHOID SURGERY  1988    HYSTERECTOMY      LEG SURGERY      fused right heel and right leg lengthened-due to polio    TONSILLECTOMY          Medications Prior to Admission:     Prior to Admission medications    Medication Sig Start Date End Date Taking? Authorizing Provider   alendronate (FOSAMAX) 70 MG tablet Take 1 tablet by mouth every 7 days 7/19/16   Halima Recio MD   hydrocortisone (ANUSOL-HC) 2.5 % rectal cream Place rectally 2 times daily. 5/31/16   Melanie Beckett MD   simvastatin (ZOCOR) 20 MG tablet Take 1 tablet by mouth nightly 4/21/16   Halima Recio MD   losartan-hydrochlorothiazide [facility]) 100-25 MG per tablet Take 1 tablet by mouth daily 4/21/16   Halima Recio MD   clobetasol (TEMOVATE) 0.05 % cream Pt uses twice weekly 9/24/15   Halima Recio MD   Fluocinolone Acetonide 0.01 % OIL Place 1 drop in ear(s) nightly 7/7/15   Melanie Beckett MD   ibuprofen (ADVIL;MOTRIN) 800 MG tablet Take 800 mg by mouth every 8 hours as needed for Pain. 5/28/14 7/19/16  Melanie Beckett MD   timolol (TIMOPTIC-XR) 0.5 % ophthalmic gel-forming Place 1 drop into both eyes daily. 9/9/12   Historical Provider, MD   Multiple Vitamins-Minerals (PRESERVISION/LUTEIN) CAPS Take 1 capsule by mouth daily. Historical Provider, MD   Omeprazole-Sodium Bicarbonate (ZEGERID OTC)  MG CAPS Take  by mouth daily. Historical Provider, MD        Allergies:     Crestor [rosuvastatin], Darvocet-n 100 [propoxyphene n-acetaminophen], Equagesic, Lipitor, and Relafen [nabumetone]    Social History:     Tobacco:    reports that she has never smoked. She has never used smokeless tobacco.  Alcohol:      reports no history of alcohol use. Drug Use:  reports no history of drug use.     Family History:     Family History   Problem Relation Age of Onset    Cancer Mother         breast    Heart Disease Father        Review of Systems:     Positive and Negative as described in HPI. Denies any shortness of breath or cough  Denies chest pain or palpitations  Denies abdominal pain, diarrhea vomiting  Denies any new numbness tremors or weakness. Physical Exam:     /75   Pulse 77   Temp 97.7 °F (36.5 °C)   Resp 19   Ht 5' 4\" (1.626 m)   Wt 180 lb 3.2 oz (81.7 kg)   SpO2 97%   BMI 30.93 kg/m²   Temp (24hrs), Av.6 °F (36.4 °C), Min:97.5 °F (36.4 °C), Max:97.7 °F (36.5 °C)      General appearance:  alert, cooperative and no distress  Eyes: Anicteric sclera. Pupils are equally round and reactive to light. Extraocular movements are intact.   Lungs:  clear to auscultation bilaterally, normal effort  Heart:  regular rate and rhythm, no murmur  Abdomen:  soft, nontender, nondistended, normal bowel sounds, no masses, hepatomegaly, splenomegaly  Extremities:  no edema, redness, tenderness in the calves  Skin:  no gross lesions, rashes, induration  Neuro:  Alert, oriented X 3, no new focal weakness    Investigations:      Laboratory Testing:  Lab Results   Component Value Date    WBC 11.2 (H) 2022    HGB 12.2 2022    HCT 36.3 2022    MCV 90.3 2022     (H) 2022     Lab Results   Component Value Date     2022    K 3.9 2022     2022    CO2 22 2022    BUN 23 2022    CREATININE 1.27 2022    GLUCOSE 123 2022    CALCIUM 9.7 2022         Assessment :      Primary Problem  Debility    Active Hospital Problems    Diagnosis Date Noted    Non-intractable vomiting with nausea [R11.2] 2022     Priority: Medium    Status post lumbar spine surgery for decompression of spinal cord [Z98.890] 2022     Priority: Medium    Debility [R53.81] 2022     Priority: Medium    HTN (hypertension) [I10] 2012    Hypertriglyceridemia [E78.1] 2012       Plan:     Principal Problem:    Debility  Active Problems:    Non-intractable vomiting with nausea    Status post lumbar spine surgery for decompression of spinal cord    HTN (hypertension)    Hypertriglyceridemia  Resolved Problems:    * No resolved hospital problems. *        1. Status post lumbar spine surgery  2. Hypertension continue amlodipine, currently controlled  3. Hyperlipidemia-continue statin  4. GERD-continue Protonix    Cough ,cxr negative  No flonase  Not on ace or arbs  On albuterol  Will watch for now  5/3  Hypertension, controlled  Is CKD, which is stable, not on NSAIDs  Back pain is improved          Consultations:   Antonio Staton WORK  IP CONSULT TO INTERNAL MEDICINE  IP CONSULT TO INTERNAL MEDICINE      Lalo Gallegos MD  5/3/2022  1:19 PM    Copy sent to Dr. Norlene Buerger, MD    Please note that this chart was generated using voice recognition Dragon dictation software. Although every effort was made to ensure the accuracy of this automated transcription, some errors in transcription may have occurred.

## 2022-05-03 NOTE — PLAN OF CARE
Problem: Discharge Planning  Goal: Discharge to home or other facility with appropriate resources  5/3/2022 0205 by Stephanie Moyer RN  Outcome: Progressing  5/2/2022 1554 by Mercy Villasenor RN  Outcome: Progressing     Problem: Safety - Adult  Goal: Free from fall injury  5/3/2022 0205 by Stephanie Moyer RN  Outcome: Progressing  5/2/2022 1554 by Mercy Villasenor RN  Outcome: Progressing     Problem: ABCDS Injury Assessment  Goal: Absence of physical injury  5/3/2022 0205 by Stephanie Moyer RN  Outcome: Progressing  5/2/2022 1554 by Mercy Villasenor RN  Outcome: Progressing     Problem: Pain  Goal: Verbalizes/displays adequate comfort level or baseline comfort level  5/3/2022 0205 by Stephanie Moyer RN  Outcome: Progressing  5/2/2022 1554 by Mercy Villasenor RN  Outcome: Progressing  Note: Patient's pain is well controlled with current regimen. See MAR. Problem: Skin/Tissue Integrity  Goal: Absence of new skin breakdown  Description: 1. Monitor for areas of redness and/or skin breakdown  2. Assess vascular access sites hourly  3. Every 4-6 hours minimum:  Change oxygen saturation probe site  4. Every 4-6 hours:  If on nasal continuous positive airway pressure, respiratory therapy assess nares and determine need for appliance change or resting period. 5/3/2022 0205 by Stephanie Moyer RN  Outcome: Progressing  5/2/2022 1554 by Mercy Villasenor RN  Outcome: Progressing  Note: No signs of increased skin or tissue breakdown is noted. See Head to Toe/LDA assessments in flowsheets.       Problem: Nutrition Deficit:  Goal: Optimize nutritional status  Outcome: Progressing

## 2022-05-03 NOTE — FLOWSHEET NOTE
05/03/22 8448   Encounter Summary   Encounter Overview/Reason  Volunteer Encounter   Service Provided For: Patient   Referral/Consult From: [facility] Family members   Last Encounter    (5/3/2022)   Complexity of Encounter Low   Spiritual/Emotional needs   Type Spiritual Support   Assessment/Intervention/Outcome   Assessment Hopeful   Intervention Active listening;Nurtured Hope   Outcome Expressed Gratitude

## 2022-05-03 NOTE — PLAN OF CARE
Problem: Discharge Planning  Goal: Discharge to home or other facility with appropriate resources  5/3/2022 1417 by Ming Cruz LPN  Outcome: Progressing  5/3/2022 0205 by Bernard Moyer RN  Outcome: Progressing  Flowsheets (Taken 5/2/2022 2100)  Discharge to home or other facility with appropriate resources:   Identify barriers to discharge with patient and caregiver   Arrange for needed discharge resources and transportation as appropriate   Identify discharge learning needs (meds, wound care, etc)   Refer to discharge planning if patient needs post-hospital services based on physician order or complex needs related to functional status, cognitive ability or social support system     Problem: Safety - Adult  Goal: Free from fall injury  5/3/2022 1417 by Ming Cruz LPN  Outcome: Progressing  5/3/2022 0205 by Bernard Moyer RN  Outcome: Progressing     Problem: ABCDS Injury Assessment  Goal: Absence of physical injury  5/3/2022 1417 by Ming Cruz LPN  Outcome: Progressing  5/3/2022 0205 by Bernard Moyer RN  Outcome: Progressing     Problem: Pain  Goal: Verbalizes/displays adequate comfort level or baseline comfort level  5/3/2022 1417 by Ming Cruz LPN  Outcome: Progressing  5/3/2022 0205 by Bernard Moyer RN  Outcome: Progressing  Flowsheets (Taken 5/2/2022 1900)  Verbalizes/displays adequate comfort level or baseline comfort level:   Encourage patient to monitor pain and request assistance   Assess pain using appropriate pain scale   Administer analgesics based on type and severity of pain and evaluate response   Implement non-pharmacological measures as appropriate and evaluate response   Consider cultural and social influences on pain and pain management   Notify Licensed Independent Practitioner if interventions unsuccessful or patient reports new pain     Problem: Safety - Adult  Goal: Free from fall injury  5/3/2022 1417 by Ming Cruz LPN  Outcome: Progressing  5/3/2022 0205 by Yosvany Brown RN  Outcome: Progressing     Problem: ABCDS Injury Assessment  Goal: Absence of physical injury  5/3/2022 1417 by Sony Mccloud LPN  Outcome: Progressing  5/3/2022 0205 by Yosvany Brown RN  Outcome: Progressing     Problem: Pain  Goal: Verbalizes/displays adequate comfort level or baseline comfort level  5/3/2022 1417 by Sony Mccloud LPN  Outcome: Progressing  5/3/2022 0205 by Yosvany Brown RN  Outcome: Progressing  Flowsheets (Taken 5/2/2022 1900)  Verbalizes/displays adequate comfort level or baseline comfort level:   Encourage patient to monitor pain and request assistance   Assess pain using appropriate pain scale   Administer analgesics based on type and severity of pain and evaluate response   Implement non-pharmacological measures as appropriate and evaluate response   Consider cultural and social influences on pain and pain management   Notify Licensed Independent Practitioner if interventions unsuccessful or patient reports new pain     Problem: Skin/Tissue Integrity  Goal: Absence of new skin breakdown  Description: 1. Monitor for areas of redness and/or skin breakdown  2. Assess vascular access sites hourly  3. Every 4-6 hours minimum:  Change oxygen saturation probe site  4. Every 4-6 hours:  If on nasal continuous positive airway pressure, respiratory therapy assess nares and determine need for appliance change or resting period.   5/3/2022 1417 by Sony Mccloud LPN  Outcome: Progressing  5/3/2022 0205 by Yosvany Brown RN  Outcome: Progressing     Problem: Nutrition Deficit:  Goal: Optimize nutritional status  5/3/2022 1417 by Sony Mccloud LPN  Outcome: Progressing  5/3/2022 0205 by Yosvany Brown RN  Outcome: Progressing

## 2022-05-04 VITALS
SYSTOLIC BLOOD PRESSURE: 132 MMHG | HEIGHT: 64 IN | WEIGHT: 180.2 LBS | HEART RATE: 72 BPM | BODY MASS INDEX: 30.77 KG/M2 | OXYGEN SATURATION: 100 % | RESPIRATION RATE: 18 BRPM | TEMPERATURE: 97.7 F | DIASTOLIC BLOOD PRESSURE: 69 MMHG

## 2022-05-04 PROCEDURE — 97110 THERAPEUTIC EXERCISES: CPT

## 2022-05-04 PROCEDURE — 6370000000 HC RX 637 (ALT 250 FOR IP): Performed by: PHYSICAL MEDICINE & REHABILITATION

## 2022-05-04 PROCEDURE — 97116 GAIT TRAINING THERAPY: CPT

## 2022-05-04 PROCEDURE — 6360000002 HC RX W HCPCS: Performed by: PHYSICAL MEDICINE & REHABILITATION

## 2022-05-04 PROCEDURE — 97530 THERAPEUTIC ACTIVITIES: CPT

## 2022-05-04 PROCEDURE — 99238 HOSP IP/OBS DSCHRG MGMT 30/<: CPT | Performed by: STUDENT IN AN ORGANIZED HEALTH CARE EDUCATION/TRAINING PROGRAM

## 2022-05-04 RX ORDER — LATANOPROST 50 UG/ML
1 SOLUTION/ DROPS OPHTHALMIC NIGHTLY
COMMUNITY
Start: 2022-05-04

## 2022-05-04 RX ORDER — ROSUVASTATIN CALCIUM 5 MG/1
5 TABLET, COATED ORAL NIGHTLY
COMMUNITY
Start: 2022-05-04

## 2022-05-04 RX ORDER — POLYETHYLENE GLYCOL 3350 17 G/17G
17 POWDER, FOR SOLUTION ORAL DAILY PRN
COMMUNITY
Start: 2022-05-04

## 2022-05-04 RX ORDER — AMLODIPINE BESYLATE 10 MG/1
10 TABLET ORAL DAILY
COMMUNITY
Start: 2022-05-04

## 2022-05-04 RX ORDER — TRAMADOL HYDROCHLORIDE 50 MG/1
50 TABLET ORAL EVERY 12 HOURS PRN
Qty: 14 TABLET | Refills: 0 | Status: SHIPPED | OUTPATIENT
Start: 2022-05-04 | End: 2022-05-11

## 2022-05-04 RX ORDER — DORZOLAMIDE HYDROCHLORIDE AND TIMOLOL MALEATE 20; 5 MG/ML; MG/ML
1 SOLUTION/ DROPS OPHTHALMIC 2 TIMES DAILY
COMMUNITY
Start: 2022-05-04

## 2022-05-04 RX ORDER — FLUTICASONE PROPIONATE 50 MCG
2 SPRAY, SUSPENSION (ML) NASAL DAILY PRN
COMMUNITY
Start: 2022-05-04

## 2022-05-04 RX ORDER — ACETAMINOPHEN 325 MG/1
650 TABLET ORAL EVERY 4 HOURS PRN
COMMUNITY
Start: 2022-05-04

## 2022-05-04 RX ORDER — TIZANIDINE 4 MG/1
4 TABLET ORAL NIGHTLY PRN
Qty: 30 TABLET | Refills: 0 | Status: SHIPPED | OUTPATIENT
Start: 2022-05-04

## 2022-05-04 RX ORDER — MELATONIN
1000 DAILY
COMMUNITY
Start: 2022-05-04

## 2022-05-04 RX ADMIN — FLUTICASONE PROPIONATE 2 SPRAY: 50 SPRAY, METERED NASAL at 11:10

## 2022-05-04 RX ADMIN — ACETAMINOPHEN 650 MG: 325 TABLET ORAL at 11:04

## 2022-05-04 RX ADMIN — DORZOLAMIDE HYDROCHLORIDE AND TIMOLOL MALEATE 1 DROP: 20; 5 SOLUTION/ DROPS OPHTHALMIC at 11:10

## 2022-05-04 RX ADMIN — HEPARIN SODIUM 5000 UNITS: 5000 INJECTION INTRAVENOUS; SUBCUTANEOUS at 05:46

## 2022-05-04 RX ADMIN — PANTOPRAZOLE SODIUM 40 MG: 40 TABLET, DELAYED RELEASE ORAL at 05:46

## 2022-05-04 RX ADMIN — AMLODIPINE BESYLATE 10 MG: 10 TABLET ORAL at 11:06

## 2022-05-04 ASSESSMENT — PAIN SCALES - WONG BAKER

## 2022-05-04 ASSESSMENT — PAIN SCALES - GENERAL: PAINLEVEL_OUTOF10: 2

## 2022-05-04 NOTE — DISCHARGE INSTR - DIET

## 2022-05-04 NOTE — PROGRESS NOTES
Reviewed AVS with Pt. Verbalized understanding. No questions or concerns at this time. Prescriptions were sent to home pharmacy. All personal belongings were given to patient. Pt was taken to personal car via wheelchair.

## 2022-05-04 NOTE — PLAN OF CARE
Problem: Discharge Planning  Goal: Discharge to home or other facility with appropriate resources  5/4/2022 0205 by Charlene Roman RN  Outcome: Progressing  Flowsheets  Taken 5/3/2022 2108  Discharge to home or other facility with appropriate resources:   Identify barriers to discharge with patient and caregiver   Arrange for needed discharge resources and transportation as appropriate   Identify discharge learning needs (meds, wound care, etc)   Refer to discharge planning if patient needs post-hospital services based on physician order or complex needs related to functional status, cognitive ability or social support system  Taken 5/3/2022 1915  Discharge to home or other facility with appropriate resources:   Identify barriers to discharge with patient and caregiver   Arrange for needed discharge resources and transportation as appropriate   Identify discharge learning needs (meds, wound care, etc)   Refer to discharge planning if patient needs post-hospital services based on physician order or complex needs related to functional status, cognitive ability or social support system  5/3/2022 1417 by Mandie Stanley LPN  Outcome: Progressing     Problem: Safety - Adult  Goal: Free from fall injury  5/4/2022 0205 by Charlene Roman RN  Outcome: Progressing  5/3/2022 1417 by Mandie Stanley LPN  Outcome: Progressing     Problem: ABCDS Injury Assessment  Goal: Absence of physical injury  5/4/2022 0205 by Charlene Roman RN  Outcome: Progressing  5/3/2022 1417 by Mandie Stanley LPN  Outcome: Progressing     Problem: Pain  Goal: Verbalizes/displays adequate comfort level or baseline comfort level  5/4/2022 0205 by Charlene Roman RN  Outcome: Progressing  Flowsheets (Taken 5/3/2022 1915)  Verbalizes/displays adequate comfort level or baseline comfort level:   Encourage patient to monitor pain and request assistance   Assess pain using appropriate pain scale   Administer analgesics based on type and severity of pain and evaluate response   Implement non-pharmacological measures as appropriate and evaluate response   Consider cultural and social influences on pain and pain management   Notify Licensed Independent Practitioner if interventions unsuccessful or patient reports new pain  5/3/2022 1417 by Deepa Iyer LPN  Outcome: Progressing     Problem: Skin/Tissue Integrity  Goal: Absence of new skin breakdown  Description: 1. Monitor for areas of redness and/or skin breakdown  2. Assess vascular access sites hourly  3. Every 4-6 hours minimum:  Change oxygen saturation probe site  4. Every 4-6 hours:  If on nasal continuous positive airway pressure, respiratory therapy assess nares and determine need for appliance change or resting period.   5/4/2022 0205 by Marquis Slava RN  Outcome: Progressing  5/3/2022 1417 by Deepa Iyer LPN  Outcome: Progressing     Problem: Nutrition Deficit:  Goal: Optimize nutritional status  5/4/2022 0205 by Marquis Slava RN  Outcome: Progressing  5/3/2022 1417 by Deepa Iyer LPN  Outcome: Progressing

## 2022-05-04 NOTE — DISCHARGE SUMMARY
Physical Medicine & Rehabilitation  Discharge Summary     Patient Identification:  Fadi Ramírez  : 1950  Admit date: 2022  Discharge date: 22  Attending provider: Adrianna Packer MD        Primary care provider: Zayra Soto MD     Discharge Diagnoses:   Lisa Frock secondary to lumbar stenosis s/p lumbar laminectomy and fusion  Acute respiratory failure - resolved  Nausea/vomiting  Allergic rhinitis  History of polio with right foot drop  HTN  HLD  Vitamin D deficiency  Glaucoma    Discharge Functional Status:    Physical therapy:  Bed Mobility: Supine to Sit: Supervision (log roll technique)  Sit to Supine: Supervision (log roll technique)  Scooting: Modified independent (hips laterally on mat)  Transfers: Sit to Stand: Modified independent  Stand to sit: Modified independent  Bed to Chair: Modified independent, Ambulation  Surface: level tile  Device: Rolling Walker  Other Apparatus: Right,AFO (lumbar corset)  Assistance: Modified Independent  Quality of Gait: Gait remains slightly antalgic, particularly over longer distances or after much activity: longer L LE stance. Forward flexed posture, but less so than first days of admission. Gait Deviations: Decreased step height,Increased YASMANI  Distance: 139' x2  Comments: No loss of balance or difficulty reported. , Stairs  # Steps : 10 (+ 3x8\" curb step with rolling walker for home env. training)  Stairs Height:  (4\" and 6\")  Rails: Left ascending  Curbs: 6\" (completed 3 times using door frame to pull on.)  Assistance: Supervision,Modified independent ,Minimal assistance (Min A (support on R) with 7.5in steps)  Comment: Step-to sequencing; practiced variety of techniques to ease home entry, including lateral ascent with single rail/bilateral UE support; forward ascent/descent with 1 UE support on L HR; curb steps with walker; bilateral UE support + forward ascent/descent.  Pt reports increased pain with lateral ascent with R LE leading/forward ascent, both on 6\" steps, and curb step techniques, though Pt gained confidence with repetition of curb step.  providing hands-on assist on curb step (writer SBA for safety), observing other techniques. Both expressing confidence in ability to gain entry to house with any technique they feel appropriate at the time. Mobility:  , PT Equipment Recommendations  Walker: Rolling (ordered 5/2/22; received 5/4/22)  Other: Pt has a borrowed rolling walker, Assessment: Presents with B LE weakness, R LE > L LE, also history of polio with R side weakness and R foot drop. Pt requires min A for mobility at this time with rolling walker. Needs skilled physical therapy to progress to PLOF. Occupational therapy:  ,  ,      Speech therapy:         Inpatient Rehabilitation Course:   Moiz Corrales is a 66 y.o. female admitted to inpatient rehabilitation on 4/23/2022. She had history of progressively worsening low back pain for several years as well as bilateral lower limb weakness with increased activity. She was noted to have spinal stenosis at L4-L5 and underwent L2-L5 laminectomy with L4-L5 fusion on 4/18/22 (Dr. Michael Yeager). Hospital course was complicated by hypoxia requiring supplemental oxygen.     She was requiring assistance for self-care activities and mobility, prompting admission to acute rehab. Rehab course: The patient participated in an aggressive multidisciplinary inpatient rehabilitation program involving 3 hours per day, 5 days per week of rehabilitation. Patient benefited from inpatient rehab and was discharged in good stable condition. Pain was controlled with as-needed tylenol, as-needed tramadol, and as-needed tizanidine. Chronic conditions were stable on medications. Patient evaluated today:  GEN: Well developed, well nourished, no acute distress  HEENT: NCAT. EOM grossly intact. Hearing grossly intact.   Mucous membranes pink and moist.  RESP: Normal breath sounds with no wheezing, rales, or rhonchi. Respirations WNL and unlabored. CV: Regular rate and rhythm. No murmurs, rubs, or gallops. ABD: Soft, non-distended, BS+ and equal.  NEURO: Alert. Speech fluent. Sensation to light touch slightly decreased in left foot compared to right. MSK:  Muscle tone and bulk are normal bilaterally. Moving the bilateral upper limbs with at least antigravity strength. Strength 4+/5 with bilateral knee extension. Minimal movement with right ankle dorsiflexion. Strength 4+/5 with left ankle dorsiflexion and plantarflexion. LIMBS: No edema in bilateral lower limbs. SKIN: Warm and dry with good turgor. PSYCH: Mood WNL. Affect WNL. Appropriately interactive.       Consults:   Internal Medicine    Significant Diagnostics:   CBC:   Lab Results   Component Value Date    WBC 11.2 05/01/2022    RBC 4.01 05/01/2022    HGB 12.2 05/01/2022    HCT 36.3 05/01/2022    MCV 90.3 05/01/2022    MCH 30.3 05/01/2022    MCHC 33.6 05/01/2022    RDW 14.7 05/01/2022     05/01/2022    MPV 7.5 05/01/2022     BMP:    Lab Results   Component Value Date     05/01/2022    K 3.9 05/01/2022     05/01/2022    CO2 22 05/01/2022    BUN 23 05/01/2022    LABALBU 4.3 07/17/2017    CREATININE 1.27 05/01/2022    CALCIUM 9.7 05/01/2022    GFRAA 50 05/01/2022    LABGLOM 41 05/01/2022    GLUCOSE 123 05/01/2022         XR CHEST (SINGLE VIEW FRONTAL)   Final Result   No acute process.                Patient Instructions:    No driving until cleared by a physician    Medications, precautions and follow up reviewed with patient and family    Follow-up visits: See after visit summary from hospitalization  Follow up PCP 1-2 weeks, Dr. Cj aMnn 5/31/22    Disposition:  Home with outpatient physical therapy    Discharge Medications:     Medication List      START taking these medications    acetaminophen 325 MG tablet  Commonly known as: TYLENOL  Take 2 tablets by mouth every 4 hours as needed for Pain     amLODIPine 10 MG tablet  Commonly known as: NORVASC     dorzolamide-timolol 22.3-6.8 MG/ML ophthalmic solution  Commonly known as: COSOPT     esomeprazole 20 MG delayed release capsule  Commonly known as: NEXIUM  Take 1 capsule by mouth every morning (before breakfast)     fluticasone 50 MCG/ACT nasal spray  Commonly known as: FLONASE  2 sprays by Each Nostril route daily as needed for Rhinitis     latanoprost 0.005 % ophthalmic solution  Commonly known as: XALATAN     polyethylene glycol 17 g packet  Commonly known as: GLYCOLAX  Take 17 g by mouth daily as needed for Constipation     rosuvastatin 5 MG tablet  Commonly known as: CRESTOR     tiZANidine 4 MG tablet  Commonly known as: ZANAFLEX  Take 1 tablet by mouth nightly as needed (muscle spasms)     traMADol 50 MG tablet  Commonly known as: ULTRAM  Take 1 tablet by mouth every 12 hours as needed for Pain (severe pain) for up to 7 days. vitamin D3 25 MCG (1000 UT) Tabs tablet  Commonly known as: CHOLECALCIFEROL  Take 1 tablet by mouth daily        CONTINUE taking these medications    PreserVision/Lutein Caps        STOP taking these medications    alendronate 70 MG tablet  Commonly known as: Fosamax     clobetasol 0.05 % cream  Commonly known as: TEMOVATE     fluocinolone 0.01 % Oil oil  Commonly known as: DERMOTIC     hydrocortisone 2.5 % rectal cream  Commonly known as:  Anusol-HC     ibuprofen 800 MG tablet  Commonly known as: ADVIL;MOTRIN     losartan-hydroCHLOROthiazide 100-25 MG per tablet  Commonly known as: Hyzaar     simvastatin 20 MG tablet  Commonly known as: ZOCOR     timolol 0.5 % ophthalmic gel-forming  Commonly known as: TIMOPTIC-XE     Zegerid OTC  MG Caps  Generic drug: Omeprazole-Sodium Bicarbonate           Where to Get Your Medications      These medications were sent to [address] [place] [address], [address] [facility] [address]    Phone: [phone]   · tiZANidine 4 MG tablet  · traMADol 50 MG tablet     You can get these medications from any pharmacy    You don't need a prescription for these medications  · acetaminophen 325 MG tablet  · esomeprazole 20 MG delayed release capsule  · fluticasone 50 MCG/ACT nasal spray  · polyethylene glycol 17 g packet  · vitamin D3 25 MCG (1000 UT) Tabs tablet           Esteban Mora MD

## 2022-05-04 NOTE — PROGRESS NOTES
Physical Therapy  Facility/Department: [facility] ACUTE REHAB  Rehabilitation Physical Therapy progress note    NAME: Dharmesh Holder  : 1950 (66 y.o.)  MRN: [id number]  CODE STATUS: Full Code  Date of Service: 22    Family / Caregiver Present: No  Referring Practitioner: Dr Mana Tam: Nursing notified of pain medication request; LPN Lorraine Krabbe provided medication at end of session. Restrictions:  Restrictions/Precautions: Surgical Protocols; Fall Risk;General Precautions  Required Braces or Orthoses  Spinal: Lumbar Corset (donned when out of bed. )  Right Lower Extremity Brace: Ankle Foot Orthotics (Family brought in; Pt complains shoe doesn't fit well/AFO do)  Position Activity Restriction  Spinal Precautions: No Bending; No Lifting; No Twisting (strenuous activity  (sx 22))     SUBJECTIVE  Subjective: Pt states she's nervous about performing steps at home, but upon education that she will have  to help, states she knows, will manage, but probably won't be leaving home often. C/o headache in middle of session. OBJECTIVE   Functional Mobility  Transfers  Sit to Stand: Modified independent  Stand to sit: Modified independent  Bed to Chair: Modified independent  Stand Pivot Transfers: Modified independent  Comment: lumbar corset, rolling walker, R AFO  Balance  Posture: Good  Sitting - Static: Good (q)  Sitting - Dynamic: Good (Edge of bed, no back or UE support)  Standing - Static: Good (rolling walker)  Standing - Dynamic: Good (rolling walker)    Environmental Mobility  Ambulation  Surface: level tile  Device: Rolling Walker  Other Apparatus: Right;AFO (lumbar corset)  Assistance: Modified Independent  Quality of Gait: Gait remains slightly antalgic, particularly over longer distances or after much activity: longer L LE stance. Forward flexed posture, but less so than first days of admission. Gait Deviations: Decreased step height; Increased YASMANI  Distance: 139' x2  Comments: No loss of balance or difficulty reported. PT Exercises  Exercise Equipment: NuStep, workload 4, 15 mins   (LEs only)     05/04/22 1010   Education   Education Given To Patient   Education Provided Home Exercise Program  (See below)   Education Method Demonstration;Verbal;Printed Information/Hand-outs   Barriers to Learning Hearing   Education Outcome Verbalized understanding   Skilled Clinical Factors Discussed home environment differences, adaptations for equipment, safety. Access Code: [email or web address]  URL: [email or web address]  Date: 05/03/2022  Prepared by: Magalys Suárez    Program Notes  Do 1-2 sets (laying down, seated and/or standing) a day. If performing standing exercise set, use a kitchen counter for stability, making sure to turn so you don't stub your toes or hit your knees on the cabinets below.      Exercises  Supine Ankle Dorsiflexion and Plantarflexion AROM - 1 x daily - 7 x weekly - 15-20 reps  Supine Heel Slide - 1 x daily - 7 x weekly - 15-20 reps  Supine Short Arc Quad - 1 x daily - 7 x weekly - 15-20 reps  Supine Bridge - 1 x daily - 7 x weekly - 10 reps - 1-5 hold  Supine Hip Abduction - 1 x daily - 7 x weekly - 15-20 reps  Seated Long Arc Quad - 1 x daily - 7 x weekly - 15-20 reps - 1-5 hold  Seated March - 1 x daily - 7 x weekly - 15-20 reps  Seated Hip Adduction Squeeze with Ball - 1 x daily - 7 x weekly - 15-20 reps - 3-5 hold  Seated Hip Abduction with Resistance - 1 x daily - 7 x weekly - 15-20 reps  Arm-chair Push Up - 1 x daily - 7 x weekly - 10 reps  Heel Toe Raises with Counter Support - 1 x daily - 7 x weekly - 15-20 reps  March in Place - 1 x daily - 7 x weekly - 15-20 reps  Standing Knee Flexion - 1 x daily - 7 x weekly - 15-20 reps  Standing 3-way Hip with Walker - 1 x daily - 7 x weekly - 15-20 reps  Tandem Stance with Support - 1 x daily - 7 x weekly - as long as possible hold  Single Leg Stance with Support - 1 x daily - 7 x weekly - as long as possible hold    ASSESSMENT  Activity Tolerance  Activity Tolerance: Patient tolerated treatment well;Patient limited by pain    Assessment  Discharge Recommendations: Home with assist PRN;Patient would benefit from continued therapy after discharge  PT D/C Equipment  Walker: Rolling (ordered 5/2/22; received 5/4/22)    GOALS  Patient Goals   Patient goals : Get stronger  Short Term Goals  Time Frame for Short term goals: 1 week  Short term goal 1: Pt able to roll in bed maintaingn Back precautions independently  Short term goal 2: Pt able to perfrom supien <>sit SBA  Short term goal 3: Pt ableto transfers SBA wiht rolling walker  Short term goal 4:  Pt able to ambulate 150 ft with rolling walker distance of 150 ft, SBA  Short term goal 5: Pt able to go up and down 5 steps with 2 UE support, min A  Long Term Goals  Time Frame for Long term goals : By DC  Long term goal 1: Pt able to perform supine<>sit mod-I  Long term goal 2: Pt able to transfer mod-I from various surfaces with rolling walker  Long term goal 3: Pt able to ambulte with rolling walker on leel as well as outside terraine at mod- I distnace of atleast 200 ft. Long term goal 4: Pt to improve 2MW distance to 150ft to improve fucntion and gait speed  Long term goal 5: Pt able to go up and down 5 to 12 steps with 1 UE support, SBA    PLAN OF CARE  Plan  Plan:  minutes of therapy at least 5 out of 7 days a week  Current Treatment Recommendations: Strengthening;Balance training;Functional mobility training;Transfer training; Endurance training;Gait training;Stair training; Safety education & training;Patient/Caregiver education & training;Equipment evaluation, education, & procurement  Safety Devices  Type of Devices: All fall risk precautions in place; Patient at risk for falls;Nurse notified    Therapy Time   Individual Concurrent Group Co-treatment   Time In 1010         Time Out 1105         [place] JAVY Martinez, [place], 05/04/22 at 3:51 PM

## 2022-05-26 RX ORDER — TIZANIDINE 4 MG/1
4 TABLET ORAL NIGHTLY PRN
Qty: 30 TABLET | Refills: 0 | OUTPATIENT
Start: 2022-05-26